# Patient Record
Sex: FEMALE | Race: WHITE | Employment: OTHER | URBAN - METROPOLITAN AREA
[De-identification: names, ages, dates, MRNs, and addresses within clinical notes are randomized per-mention and may not be internally consistent; named-entity substitution may affect disease eponyms.]

---

## 2017-03-15 ENCOUNTER — HOSPITAL ENCOUNTER (OUTPATIENT)
Dept: WOMENS IMAGING | Age: 72
Discharge: HOME | End: 2017-03-15
Attending: FAMILY MEDICINE | Admitting: FAMILY MEDICINE

## 2017-03-15 DIAGNOSIS — Z12.31 ENCOUNTER FOR SCREENING MAMMOGRAM FOR BREAST CANCER: ICD-10-CM

## 2017-03-15 DIAGNOSIS — Z00.00 ENCOUNTER FOR GENERAL ADULT MEDICAL EXAMINATION WITHOUT ABNORMAL FINDINGS: ICD-10-CM

## 2017-03-15 DIAGNOSIS — Z13.820 SCREENING FOR OSTEOPOROSIS: ICD-10-CM

## 2017-03-15 DIAGNOSIS — Z00.00 ROUTINE GENERAL MEDICAL EXAMINATION AT A HEALTH CARE FACILITY: ICD-10-CM

## 2017-04-03 ENCOUNTER — OFFICE VISIT (OUTPATIENT)
Dept: INTERNAL MEDICINE CLINIC | Age: 72
End: 2017-04-03

## 2017-04-03 VITALS
BODY MASS INDEX: 39.82 KG/M2 | DIASTOLIC BLOOD PRESSURE: 82 MMHG | HEART RATE: 80 BPM | SYSTOLIC BLOOD PRESSURE: 124 MMHG | WEIGHT: 239 LBS | HEIGHT: 65 IN

## 2017-04-03 DIAGNOSIS — I10 ESSENTIAL HYPERTENSION: ICD-10-CM

## 2017-04-03 DIAGNOSIS — D17.9 LIPOMA, UNSPECIFIED SITE: ICD-10-CM

## 2017-04-03 DIAGNOSIS — N18.30 CHRONIC KIDNEY DISEASE (CKD), STAGE III (MODERATE) (HCC): ICD-10-CM

## 2017-04-03 DIAGNOSIS — E78.2 MIXED HYPERLIPIDEMIA: Primary | ICD-10-CM

## 2017-04-03 DIAGNOSIS — M67.431 GANGLION OF WRIST, RIGHT: ICD-10-CM

## 2017-04-03 DIAGNOSIS — R73.9 HYPERGLYCEMIA: ICD-10-CM

## 2017-04-03 DIAGNOSIS — Z79.899 LONG-TERM USE OF HIGH-RISK MEDICATION: ICD-10-CM

## 2017-04-03 DIAGNOSIS — E66.01 SEVERE OBESITY (BMI 35.0-39.9): ICD-10-CM

## 2017-04-03 PROBLEM — M67.439 GANGLION OF WRIST: Status: ACTIVE | Noted: 2017-04-03

## 2017-04-03 PROBLEM — D17.1 LIPOMA OF TORSO: Status: ACTIVE | Noted: 2017-04-03

## 2017-04-03 LAB
A/G RATIO: 1.7 (ref 1.1–2.2)
ALBUMIN SERPL-MCNC: 4.4 G/DL (ref 3.4–5)
ALP BLD-CCNC: 73 U/L (ref 40–129)
ALT SERPL-CCNC: 10 U/L (ref 10–40)
ANION GAP SERPL CALCULATED.3IONS-SCNC: 16 MMOL/L (ref 3–16)
AST SERPL-CCNC: 9 U/L (ref 15–37)
BASOPHILS ABSOLUTE: 0.1 K/UL (ref 0–0.2)
BASOPHILS RELATIVE PERCENT: 0.7 %
BILIRUB SERPL-MCNC: 0.6 MG/DL (ref 0–1)
BUN BLDV-MCNC: 17 MG/DL (ref 7–20)
CALCIUM SERPL-MCNC: 9.3 MG/DL (ref 8.3–10.6)
CHLORIDE BLD-SCNC: 99 MMOL/L (ref 99–110)
CHOLESTEROL, TOTAL: 193 MG/DL (ref 0–199)
CO2: 24 MMOL/L (ref 21–32)
CREAT SERPL-MCNC: 1.1 MG/DL (ref 0.6–1.2)
EOSINOPHILS ABSOLUTE: 0.1 K/UL (ref 0–0.6)
EOSINOPHILS RELATIVE PERCENT: 1.2 %
GFR AFRICAN AMERICAN: 59
GFR NON-AFRICAN AMERICAN: 49
GLOBULIN: 2.6 G/DL
GLUCOSE BLD-MCNC: 123 MG/DL (ref 70–99)
HCT VFR BLD CALC: 47.2 % (ref 36–48)
HDLC SERPL-MCNC: 50 MG/DL (ref 40–60)
HEMOGLOBIN: 15.5 G/DL (ref 12–16)
LDL CHOLESTEROL CALCULATED: 102 MG/DL
LYMPHOCYTES ABSOLUTE: 1.7 K/UL (ref 1–5.1)
LYMPHOCYTES RELATIVE PERCENT: 18.4 %
MCH RBC QN AUTO: 29.7 PG (ref 26–34)
MCHC RBC AUTO-ENTMCNC: 32.7 G/DL (ref 31–36)
MCV RBC AUTO: 90.6 FL (ref 80–100)
MONOCYTES ABSOLUTE: 0.8 K/UL (ref 0–1.3)
MONOCYTES RELATIVE PERCENT: 8.3 %
NEUTROPHILS ABSOLUTE: 6.5 K/UL (ref 1.7–7.7)
NEUTROPHILS RELATIVE PERCENT: 71.4 %
PDW BLD-RTO: 13.9 % (ref 12.4–15.4)
PLATELET # BLD: 264 K/UL (ref 135–450)
PMV BLD AUTO: 7.7 FL (ref 5–10.5)
POTASSIUM SERPL-SCNC: 4.5 MMOL/L (ref 3.5–5.1)
RBC # BLD: 5.21 M/UL (ref 4–5.2)
SODIUM BLD-SCNC: 139 MMOL/L (ref 136–145)
TOTAL PROTEIN: 7 G/DL (ref 6.4–8.2)
TRIGL SERPL-MCNC: 203 MG/DL (ref 0–150)
URIC ACID, SERUM: 6.7 MG/DL (ref 2.6–6)
VLDLC SERPL CALC-MCNC: 41 MG/DL
WBC # BLD: 9.1 K/UL (ref 4–11)

## 2017-04-03 PROCEDURE — 99214 OFFICE O/P EST MOD 30 MIN: CPT | Performed by: FAMILY MEDICINE

## 2017-04-03 ASSESSMENT — ENCOUNTER SYMPTOMS
WHEEZING: 0
SHORTNESS OF BREATH: 0
COUGH: 0
CHEST TIGHTNESS: 0

## 2017-04-04 LAB
ESTIMATED AVERAGE GLUCOSE: 131.2 MG/DL
HBA1C MFR BLD: 6.2 %

## 2017-05-19 ENCOUNTER — TELEPHONE (OUTPATIENT)
Dept: INTERNAL MEDICINE CLINIC | Age: 72
End: 2017-05-19

## 2017-09-29 ENCOUNTER — TELEPHONE (OUTPATIENT)
Dept: RHEUMATOLOGY | Age: 72
End: 2017-09-29

## 2017-09-29 DIAGNOSIS — H53.8 CLOUDY VISION: Primary | ICD-10-CM

## 2017-09-29 NOTE — TELEPHONE ENCOUNTER
Let me know if I need to do order for the referral.  If yes, I need to know the diagnosis or reason for the visit.

## 2017-09-29 NOTE — TELEPHONE ENCOUNTER
Patient calling stating that she needs a referral for   Kristy Ward for an Eye Exam  Fax# [phone]  Ph# [phone]  Appt Oct 10th 1:45  [employer] [facility]

## 2017-10-03 ENCOUNTER — OFFICE VISIT (OUTPATIENT)
Dept: INTERNAL MEDICINE CLINIC | Age: 72
End: 2017-10-03

## 2017-10-03 VITALS
WEIGHT: 241.2 LBS | BODY MASS INDEX: 40.19 KG/M2 | SYSTOLIC BLOOD PRESSURE: 126 MMHG | HEIGHT: 65 IN | DIASTOLIC BLOOD PRESSURE: 84 MMHG | HEART RATE: 64 BPM

## 2017-10-03 DIAGNOSIS — Z23 NEEDS FLU SHOT: ICD-10-CM

## 2017-10-03 DIAGNOSIS — E66.01 SEVERE OBESITY (BMI >= 40) (HCC): ICD-10-CM

## 2017-10-03 DIAGNOSIS — G47.33 OBSTRUCTIVE SLEEP APNEA: ICD-10-CM

## 2017-10-03 DIAGNOSIS — I10 ESSENTIAL HYPERTENSION: ICD-10-CM

## 2017-10-03 DIAGNOSIS — F41.1 GENERALIZED ANXIETY DISORDER: ICD-10-CM

## 2017-10-03 DIAGNOSIS — R73.02 IMPAIRED GLUCOSE TOLERANCE: ICD-10-CM

## 2017-10-03 DIAGNOSIS — E78.2 MIXED HYPERLIPIDEMIA: Primary | ICD-10-CM

## 2017-10-03 LAB
A/G RATIO: 1.4 (ref 1.1–2.2)
ALBUMIN SERPL-MCNC: 4.1 G/DL (ref 3.4–5)
ALP BLD-CCNC: 72 U/L (ref 40–129)
ALT SERPL-CCNC: 13 U/L (ref 10–40)
ANION GAP SERPL CALCULATED.3IONS-SCNC: 14 MMOL/L (ref 3–16)
AST SERPL-CCNC: 10 U/L (ref 15–37)
BASOPHILS ABSOLUTE: 0.1 K/UL (ref 0–0.2)
BASOPHILS RELATIVE PERCENT: 1 %
BILIRUB SERPL-MCNC: 0.5 MG/DL (ref 0–1)
BUN BLDV-MCNC: 12 MG/DL (ref 7–20)
CALCIUM SERPL-MCNC: 9.3 MG/DL (ref 8.3–10.6)
CHLORIDE BLD-SCNC: 98 MMOL/L (ref 99–110)
CO2: 29 MMOL/L (ref 21–32)
CREAT SERPL-MCNC: 1 MG/DL (ref 0.6–1.2)
EOSINOPHILS ABSOLUTE: 0.3 K/UL (ref 0–0.6)
EOSINOPHILS RELATIVE PERCENT: 3.1 %
GFR AFRICAN AMERICAN: >60
GFR NON-AFRICAN AMERICAN: 54
GLOBULIN: 2.9 G/DL
GLUCOSE BLD-MCNC: 127 MG/DL (ref 70–99)
HCT VFR BLD CALC: 47 % (ref 36–48)
HEMOGLOBIN: 15.7 G/DL (ref 12–16)
LYMPHOCYTES ABSOLUTE: 2.1 K/UL (ref 1–5.1)
LYMPHOCYTES RELATIVE PERCENT: 24.8 %
MCH RBC QN AUTO: 30.9 PG (ref 26–34)
MCHC RBC AUTO-ENTMCNC: 33.5 G/DL (ref 31–36)
MCV RBC AUTO: 92.3 FL (ref 80–100)
MONOCYTES ABSOLUTE: 0.9 K/UL (ref 0–1.3)
MONOCYTES RELATIVE PERCENT: 10.1 %
NEUTROPHILS ABSOLUTE: 5.3 K/UL (ref 1.7–7.7)
NEUTROPHILS RELATIVE PERCENT: 61 %
PDW BLD-RTO: 13.8 % (ref 12.4–15.4)
PLATELET # BLD: 282 K/UL (ref 135–450)
PMV BLD AUTO: 7.4 FL (ref 5–10.5)
POTASSIUM SERPL-SCNC: 4.6 MMOL/L (ref 3.5–5.1)
RBC # BLD: 5.09 M/UL (ref 4–5.2)
SODIUM BLD-SCNC: 141 MMOL/L (ref 136–145)
TOTAL PROTEIN: 7 G/DL (ref 6.4–8.2)
URIC ACID, SERUM: 7.1 MG/DL (ref 2.6–6)
WBC # BLD: 8.7 K/UL (ref 4–11)

## 2017-10-03 PROCEDURE — 99214 OFFICE O/P EST MOD 30 MIN: CPT | Performed by: FAMILY MEDICINE

## 2017-10-03 PROCEDURE — G0008 ADMIN INFLUENZA VIRUS VAC: HCPCS | Performed by: FAMILY MEDICINE

## 2017-10-03 PROCEDURE — 90662 IIV NO PRSV INCREASED AG IM: CPT | Performed by: FAMILY MEDICINE

## 2017-10-03 RX ORDER — SIMVASTATIN 40 MG
TABLET ORAL
Qty: 30 TABLET | Refills: 5 | Status: SHIPPED | OUTPATIENT
Start: 2017-10-03 | End: 2017-10-04 | Stop reason: SDUPTHER

## 2017-10-03 RX ORDER — BUPROPION HYDROCHLORIDE 150 MG/1
TABLET ORAL
Qty: 90 TABLET | Refills: 3 | Status: SHIPPED | OUTPATIENT
Start: 2017-10-03 | End: 2018-10-03 | Stop reason: SDUPTHER

## 2017-10-03 RX ORDER — METFORMIN HYDROCHLORIDE 500 MG/1
TABLET, EXTENDED RELEASE ORAL
Qty: 30 TABLET | Refills: 5 | Status: SHIPPED | OUTPATIENT
Start: 2017-10-03 | End: 2018-02-04 | Stop reason: SDUPTHER

## 2017-10-03 RX ORDER — LOSARTAN POTASSIUM AND HYDROCHLOROTHIAZIDE 12.5; 5 MG/1; MG/1
TABLET ORAL
Qty: 90 TABLET | Refills: 3 | Status: SHIPPED | OUTPATIENT
Start: 2017-10-03 | End: 2018-10-07 | Stop reason: ALTCHOICE

## 2017-10-03 RX ORDER — ESCITALOPRAM OXALATE 10 MG/1
TABLET ORAL
Qty: 30 TABLET | Refills: 11 | Status: SHIPPED | OUTPATIENT
Start: 2017-10-03 | End: 2018-03-30 | Stop reason: SDUPTHER

## 2017-10-03 ASSESSMENT — PATIENT HEALTH QUESTIONNAIRE - PHQ9
SUM OF ALL RESPONSES TO PHQ9 QUESTIONS 1 & 2: 1
2. FEELING DOWN, DEPRESSED OR HOPELESS: 0
1. LITTLE INTEREST OR PLEASURE IN DOING THINGS: 1
SUM OF ALL RESPONSES TO PHQ QUESTIONS 1-9: 1

## 2017-10-03 ASSESSMENT — ENCOUNTER SYMPTOMS
CHEST TIGHTNESS: 0
WHEEZING: 0
COUGH: 0
SHORTNESS OF BREATH: 0

## 2017-10-03 NOTE — PATIENT INSTRUCTIONS
Floaters and Flashes: Care Instructions  Your Care Instructions    Floaters are spots and lines that \"float\" across your field of vision. They are caused by stray cells or strands of tissue inside the eyeball. Flashes are sparkles or lightning streaks. These occur in your side vision. This is called the peripheral vision. Floaters and flashes usually aren't serious. In many cases, they're a normal part of getting older. Some people get used to them. Others find them annoying. If floaters bother you, you can try to look up and then down. This may make them go away. For now, your doctor doesn't think your symptoms are a sign of a more serious problem. But an eye exam is the only way to know for sure. Follow-up care is a key part of your treatment and safety. Be sure to make and go to all appointments, and call your doctor if you are having problems. It's also a good idea to know your test results and keep a list of the medicines you take. When should you call for help? Call your doctor now or seek immediate medical care if:  · You have vision changes. Watch closely for changes in your health, and be sure to contact your doctor if:  · You see new floaters. · You see new flashes of light. · You do not get better as expected. Where can you learn more? Go to [email or web address] and sign in to your [employer] account. Enter L960 in the [facility] box to learn more about \"Floaters and Flashes: Care Instructions. \"     If you do not have an account, please click on the \"Sign Up Now\" link. Current as of: March 14, 2017  Content Version: 11.3  © 5900-6608 [employer]. Care instructions adapted under license by [facility] ([facility]). If you have questions about a medical condition or this instruction, always ask your healthcare professional. [place] [address] any warranty or liability for your use of this information.     [id number]      WEIGHT 1 pound per week each month consistently.

## 2017-10-03 NOTE — MR AVS SNAPSHOT
After Visit Summary             Pk Harman   10/3/2017 10:20 AM   Office Visit    Description:  Female : 1945   Provider:  Sandeep Puente MD   Department:  [facility]              Your Follow-Up and Future Appointments         Below is a list of your follow-up and future appointments. This may not be a complete list as you may have made appointments directly with providers that we are not aware of or your providers may have made some for you. Please call your providers to confirm appointments. It is important to keep your appointments. Please bring your current insurance card, photo ID, co-pay, and all medication bottles to your appointment. If self-pay, payment is expected at the time of service. Your To-Do List     Follow-Up    Return in about 6 months (around 4/3/2018) for fasting labs and medication check up. Information from Your Visit        Department     Name Address Phone Fax    [facility] [address] [place] [address] [phone]      You Were Seen for:         Comments    Mixed hyperlipidemia   [272. 2. ICD-9-CM]         Vital Signs     Blood Pressure Pulse Height Weight Body Mass Index Smoking Status    146/84 64 5' 5\" (1.651 m) 241 lb 3.2 oz (109.4 kg) 40.14 kg/m2 Never Smoker      Additional Information about your Body Mass Index (BMI)           Your BMI as listed above is considered obese (30 or more). BMI is an estimate of body fat, calculated from your height and weight. The higher your BMI, the greater your risk of heart disease, high blood pressure, type 2 diabetes, stroke, gallstones, arthritis, sleep apnea, and certain cancers. BMI is not perfect. It may overestimate body fat in athletes and people who are more muscular.   Even a small weight loss (between 5 and 10 percent of your current weight) by decreasing your calorie intake and license by [facility] ([facility]). If you have questions about a medical condition or this instruction, always ask your healthcare professional. [place] [address] any warranty or liability for your use of this information. [email or web address]      WEIGHT LOSS SUGGESTIONS --- pick and choose items from the following to work on.  1.  Eat breakfast --- the most important meal of the day (and try to spread your calories out over the day.)    2. Eat your biggest meal of the day at lunch or avoid having all your calories for supper. Food if to provide energy during your time of activity. We do not need food before we sleep or rest.  3.  Begin meals with a low fat salad, soup, broth or a glass of water. 4.  Eat more vegetables and whole grains at each meal.  5.  Limit sugar sweetened beverages to no more than 8 ounces of REAL fruit juice per day. Even sweet flavored diet drinks can make you crave more sweets and starches. 6.  Limit your alcohol intake. 7.  Know what a portion size looks like --- and stick to it. Measure food or learn the portion sizes using your hand, palm and fist.  Using smaller plates and glasses can help. Portion sizes:  Solid foods like a potato = the size of your fist.  Flat foods, like a piece of meat the size of your hand minus your fingers. 8.  Eat slowly so your body has time to know when you are full. Also slowing down will help you focus on enjoying the tastes and smells of your food. Stop eating when you feel satisfied, not stuffed. 9.  Be more active in your daily life. 8. Get support from family and friends. Support is important for long-term success. 11.  Fill 1/2 your plate with vegetables and fruits;  1/4 with protein like meat or beans and 1/4 with starch --- whole grain is best.    12. AVOID ARTIFICIAL SWEETENERS and HIGH FRUCTOSE CORN SYRUP as much as possible and certainly do not use these every day.   Therefore, all soda [address] [facility]  [address] [facility] [address]     Phone:  [phone]     buPROPion 150 MG extended release tablet    escitalopram 10 MG tablet    losartan-hydrochlorothiazide 50-12.5 MG per tablet    metFORMIN 500 MG extended release tablet    simvastatin 40 MG tablet               Your Current Medications Are              metFORMIN (GLUCOPHAGE-XR) 500 MG extended release tablet Take 1 tablet daily:  End of breakfast OR at bedtime. buPROPion (WELLBUTRIN XL) 150 MG extended release tablet TAKE ONE TABLET BY MOUTH ONE TIME DAILY    escitalopram (LEXAPRO) 10 MG tablet TAKE 1 TABLET BY MOUTH DAILY    losartan-hydrochlorothiazide (HYZAAR) 50-12.5 MG per tablet TAKE ONE TABLET BY MOUTH DAILY    simvastatin (ZOCOR) 40 MG tablet TAKE 1 TABLET BY MOUTH NIGHTLY    gabapentin (NEURONTIN) 300 MG capsule TAKE ONE CAPSULE BY MOUTH THREE TIMES DAILY    diphenhydrAMINE (BENADRYL) 25 MG capsule Take 25 mg by mouth nightly as needed for Itching    ibuprofen (ADVIL;MOTRIN) 800 MG tablet TAKE ONE TABLET EVERY 8 HOURS AS NEEDED FOR PAIN    omeprazole (PRILOSEC) 20 MG capsule Take 20 mg by mouth daily. cetirizine (ZYRTEC) 10 MG tablet Take 10 mg by mouth daily. Allergies           No Known Allergies      We Ordered/Performed the following           CBC Auto Differential     Comprehensive Metabolic Panel     Hemoglobin A1C     Influenza, High Dose, 65 yrs  and older, IM, PF, Prefill Syr, 0.5mL (FLUZONE HD)     Uric Acid     [facility]     Scheduling Instructions:    [facility]   [address] [place] [address], [facility]  Phone: [phone]    Comments: The patient can be scheduled with any member of the group, including the provider with the first available appointments. She has CPAP and had sleep study at [facility]. Airy? She needs new parts and equipment. Please evaluate.          Additional Information Basic Information     Date Of Birth Sex Race Ethnicity Preferred Language    1945 Female White Non-/Non  English      Problem List as of 10/3/2017  Date Reviewed: 4/3/2017                Lipoma    Ganglion of wrist    BREWSTER (dyspnea on exertion)    Obesity, severe (BMI >= 40) (HCC)    Periodic limb movement disorder    Osteoarthritis    GERD (gastroesophageal reflux disease)    Mixed hyperlipidemia    Obstructive sleep apnea    Pre-diabetes    Hypertension    Generalized anxiety disorder    Lipoma of skin    Myalgia and myositis      Immunizations as of 10/3/2017     Name Date    Influenza Virus Vaccine 9/30/2013, 9/15/2011    Influenza, High Dose 9/7/2016, 11/10/2014    Pneumococcal 13-valent Conjugate ([id number]) 3/1/2016    Pneumococcal Polysaccharide ([id number]) 1/28/2013    Td 6/5/2008    Tdap (Boostrix, Adacel) 3/1/2016      Preventive Care        Date Due    Yearly Flu Vaccine (1) 9/1/2017    Colonoscopy 9/5/2018    Mammograms are recommended every 2 years for low/average risk patients aged 48 - 69, and every year for high risk patients per updated national guidelines. However these guidelines can be individualized by your provider. 3/15/2019    Osteoporosis screening or a bone density scan (Dexa) is recommended once at age 72. Based upon the results and risk factors for bone loss, your provider will recommend whether this needs to be repeated. 3/15/2022    Cholesterol Screening 4/3/2022    Tetanus Combination Vaccine (2 - Td) 3/1/2026            MyChart Signup           [employer] allows you to send messages to your doctor, view your test results, renew your prescriptions, schedule appointments, view visit notes, and more. How Do I Sign Up? 1. In your Internet browser, go to [email or web address]  2. Click on the Sign Up Now link in the Sign In box. You will see the New Member Sign Up page. 3. Enter your [facility] Access Code exactly as it appears below. You will not need to use this code after youve completed the sign-up process. If you do not sign up before the expiration date, you must request a new code. [facility] Access Code: [email or web address]  Expires: 12/2/2017 11:21 AM    4. Enter your Social Security Number ([id number]) and Date of Birth (mm/dd/yyyy) as indicated and click Submit. You will be taken to the next sign-up page. 5. Create a Vimblyt ID. This will be your [facility] login ID and cannot be changed, so think of one that is secure and easy to remember. 6. Create a [facility] password. You can change your password at any time. 7. Enter your Password Reset Question and Answer. This can be used at a later time if you forget your password. 8. Enter your e-mail address. You will receive e-mail notification when new information is available in [address]. 9. Click Sign Up. You can now view your medical record. Additional Information  If you have questions, please contact the physician practice where you receive care. Remember, [facility] is NOT to be used for urgent needs. For medical emergencies, dial 911. For questions regarding your [facility] account call [phone]. If you have a clinical question, please call your doctor's office.

## 2017-10-03 NOTE — PROGRESS NOTES
tablet Take 10 mg by mouth daily. Social History   Substance Use Topics    Smoking status: Never Smoker    Smokeless tobacco: Never Used    Alcohol use Yes      Comment: rarely             Review of Systems   Constitutional: Negative for unexpected weight change. Respiratory: Negative for cough, chest tightness, shortness of breath and wheezing. Cardiovascular: Negative for chest pain, palpitations and leg swelling. Endocrine: Negative for polydipsia, polyphagia and polyuria. Skin: Negative for rash and wound. Neurological: Negative for dizziness, syncope, facial asymmetry, speech difficulty, weakness, numbness and headaches. Psychiatric/Behavioral: Negative for dysphoric mood. Objective:   Physical Exam   Constitutional: She is oriented to person, place, and time. She appears well-developed and well-nourished. No distress. Eyes: Conjunctivae are normal. No scleral icterus. Neck: Normal range of motion. Neck supple. Carotid bruit is not present. No thyroid mass and no thyromegaly present. Cardiovascular: Normal rate, regular rhythm, S1 normal, S2 normal, normal heart sounds and intact distal pulses. No murmur heard. Pulmonary/Chest: Effort normal and breath sounds normal. No respiratory distress. She has no decreased breath sounds. She has no wheezes. She has no rhonchi. She has no rales. Abdominal: Soft. Normal appearance and bowel sounds are normal. She exhibits no abdominal bruit and no mass. There is no hepatomegaly. There is no tenderness. Lymphadenopathy:     She has no cervical adenopathy. Neurological: She is alert and oriented to person, place, and time. She displays no tremor. She exhibits normal muscle tone. Coordination and gait normal.   Skin: Skin is warm and dry. She is not diaphoretic. No cyanosis. No pallor. Nails show no clubbing. Psychiatric: She has a normal mood and affect.  Her speech is normal and behavior is normal. Cognition and memory are Center  - CBC Auto Differential    7. Needs influenza vaccine. - high dose given. Vaccine information statement given. Risk and benefits of vaccine(s) given discussed with patient and questions answered. Plan:      See assessment and/or orders. See after visit summary, patient instructions, and reference hand-outs. Discussed use, benefit, and side effects of prescribed medications. Barriers to medication compliance addressed. All patient questions answered.

## 2017-10-04 LAB
ESTIMATED AVERAGE GLUCOSE: 134.1 MG/DL
HBA1C MFR BLD: 6.3 %

## 2017-11-18 DIAGNOSIS — F41.1 GENERALIZED ANXIETY DISORDER: ICD-10-CM

## 2017-11-20 RX ORDER — ESCITALOPRAM OXALATE 10 MG/1
TABLET ORAL
Qty: 30 TABLET | Refills: 4 | Status: SHIPPED | OUTPATIENT
Start: 2017-11-20 | End: 2018-03-30 | Stop reason: SDUPTHER

## 2017-12-07 ENCOUNTER — OFFICE VISIT (OUTPATIENT)
Dept: SLEEP MEDICINE | Age: 72
End: 2017-12-07

## 2017-12-07 VITALS
HEART RATE: 75 BPM | RESPIRATION RATE: 16 BRPM | DIASTOLIC BLOOD PRESSURE: 78 MMHG | HEIGHT: 65 IN | WEIGHT: 236.8 LBS | BODY MASS INDEX: 39.45 KG/M2 | OXYGEN SATURATION: 97 % | SYSTOLIC BLOOD PRESSURE: 138 MMHG

## 2017-12-07 DIAGNOSIS — Z71.3 DIETARY COUNSELING: ICD-10-CM

## 2017-12-07 DIAGNOSIS — Z99.89 OSA ON CPAP: Primary | ICD-10-CM

## 2017-12-07 DIAGNOSIS — G47.33 OSA ON CPAP: Primary | ICD-10-CM

## 2017-12-07 DIAGNOSIS — E66.01 MORBID OBESITY DUE TO EXCESS CALORIES (HCC): ICD-10-CM

## 2017-12-07 PROCEDURE — 3014F SCREEN MAMMO DOC REV: CPT | Performed by: PSYCHIATRY & NEUROLOGY

## 2017-12-07 PROCEDURE — 1090F PRES/ABSN URINE INCON ASSESS: CPT | Performed by: PSYCHIATRY & NEUROLOGY

## 2017-12-07 PROCEDURE — 1123F ACP DISCUSS/DSCN MKR DOCD: CPT | Performed by: PSYCHIATRY & NEUROLOGY

## 2017-12-07 PROCEDURE — 1036F TOBACCO NON-USER: CPT | Performed by: PSYCHIATRY & NEUROLOGY

## 2017-12-07 PROCEDURE — G8399 PT W/DXA RESULTS DOCUMENT: HCPCS | Performed by: PSYCHIATRY & NEUROLOGY

## 2017-12-07 PROCEDURE — 3017F COLORECTAL CA SCREEN DOC REV: CPT | Performed by: PSYCHIATRY & NEUROLOGY

## 2017-12-07 PROCEDURE — G8417 CALC BMI ABV UP PARAM F/U: HCPCS | Performed by: PSYCHIATRY & NEUROLOGY

## 2017-12-07 PROCEDURE — G8484 FLU IMMUNIZE NO ADMIN: HCPCS | Performed by: PSYCHIATRY & NEUROLOGY

## 2017-12-07 PROCEDURE — 99204 OFFICE O/P NEW MOD 45 MIN: CPT | Performed by: PSYCHIATRY & NEUROLOGY

## 2017-12-07 PROCEDURE — 4040F PNEUMOC VAC/ADMIN/RCVD: CPT | Performed by: PSYCHIATRY & NEUROLOGY

## 2017-12-07 PROCEDURE — G8427 DOCREV CUR MEDS BY ELIG CLIN: HCPCS | Performed by: PSYCHIATRY & NEUROLOGY

## 2017-12-07 ASSESSMENT — SLEEP AND FATIGUE QUESTIONNAIRES
HOW LIKELY ARE YOU TO NOD OFF OR FALL ASLEEP WHILE WATCHING TV: 3
HOW LIKELY ARE YOU TO NOD OFF OR FALL ASLEEP WHILE SITTING AND TALKING TO SOMEONE: 0
HOW LIKELY ARE YOU TO NOD OFF OR FALL ASLEEP IN A CAR, WHILE STOPPED FOR A FEW MINUTES IN TRAFFIC: 0
HOW LIKELY ARE YOU TO NOD OFF OR FALL ASLEEP WHILE LYING DOWN TO REST IN THE AFTERNOON WHEN CIRCUMSTANCES PERMIT: 3
HOW LIKELY ARE YOU TO NOD OFF OR FALL ASLEEP WHEN YOU ARE A PASSENGER IN A CAR FOR AN HOUR WITHOUT A BREAK: 2
HOW LIKELY ARE YOU TO NOD OFF OR FALL ASLEEP WHILE SITTING AND READING: 2
HOW LIKELY ARE YOU TO NOD OFF OR FALL ASLEEP WHILE SITTING INACTIVE IN A PUBLIC PLACE: 0
NECK CIRCUMFERENCE (INCHES): 15.5
HOW LIKELY ARE YOU TO NOD OFF OR FALL ASLEEP WHILE SITTING QUIETLY AFTER LUNCH WITHOUT ALCOHOL: 1
ESS TOTAL SCORE: 11

## 2017-12-07 ASSESSMENT — ENCOUNTER SYMPTOMS
ALLERGIC/IMMUNOLOGIC NEGATIVE: 1
WHEEZING: 1
EYES NEGATIVE: 1

## 2017-12-07 NOTE — PROGRESS NOTES
patient has trouble falling asleep due to this feeling, mostly at the bed time associated with sleep disturbance and with involuntary, jerking movements of the legs during sleep. Had study done on 8/16/2006 which showed an AHI - 67.3/hr with Low SaO2 - 83% and time below 90% of 18% of the time. This is consistent with severe TAZ (327.23)        DOT/CDL - No  FAA/'s license - No      Previous Report(s) Reviewed: historical medical records         Social History     Social History    Marital status:      Spouse name: Bard Moss Number of children: 2    Years of education: N/A     Occupational History     at [email or web address] History Main Topics    Smoking status: Never Smoker    Smokeless tobacco: Never Used    Alcohol use Yes      Comment: rarely    Drug use: Unknown    Sexual activity: Not on file     Other Topics Concern    Not on file     Social History Narrative     with prostate cancer, but doing well. History of stomach cancer 2001. Does water aerobics at [facility]        Caffeine: SODA - 1 can a day TEA - 0  COFFEE - 1 cup in the morning           Prior to Admission medications    Medication Sig Start Date End Date Taking? Authorizing Provider   escitalopram (LEXAPRO) 10 MG tablet TAKE 1 TABLET BY MOUTH DAILY 11/20/17  Yes Ruma Bazan MD   simvastatin (ZOCOR) 40 MG tablet TAKE 1 TABLET BY MOUTH NIGHTLY 10/4/17  Yes Ruma Bazan MD   metFORMIN (GLUCOPHAGE-XR) 500 MG extended release tablet Take 1 tablet daily:  End of breakfast OR at bedtime.  10/3/17  Yes Ruma Bazan MD   buPROPion (WELLBUTRIN XL) 150 MG extended release tablet TAKE ONE TABLET BY MOUTH ONE TIME DAILY 10/3/17  Yes Ruma Bazan MD   escitalopram (LEXAPRO) 10 MG tablet TAKE 1 TABLET BY MOUTH DAILY 10/3/17  Yes Ruma Bazan MD   losartan-hydrochlorothiazide (HYZAAR) 50-12.5 MG per tablet TAKE ONE TABLET BY MOUTH DAILY 10/3/17  Yes Ruma Bazan MD gabapentin (NEURONTIN) 300 MG capsule TAKE ONE CAPSULE BY MOUTH THREE TIMES DAILY 5/19/17  Yes Talisha Turner MD   diphenhydrAMINE (BENADRYL) 25 MG capsule Take 25 mg by mouth nightly as needed for Itching   Yes Historical Provider, MD   ibuprofen (ADVIL;MOTRIN) 800 MG tablet TAKE ONE TABLET EVERY 8 HOURS AS NEEDED FOR PAIN 3/1/16  Yes Talisha Turner MD   omeprazole (PRILOSEC) 20 MG capsule Take 20 mg by mouth daily. Yes Historical Provider, MD   cetirizine (ZYRTEC) 10 MG tablet Take 10 mg by mouth daily.      Yes Historical Provider, MD       Allergies as of 12/07/2017    (No Known Allergies)       Patient Active Problem List   Diagnosis    Osteoarthritis    GERD (gastroesophageal reflux disease)    Mixed hyperlipidemia    Obstructive sleep apnea    Impaired glucose tolerance    Hypertension    Generalized anxiety disorder    Lipoma of skin    Myalgia and myositis    Chronic kidney disease (CKD), stage III (moderate)    Periodic limb movement disorder    Obesity, severe (BMI >= 40) (HCC)    BREWSTER (dyspnea on exertion)    Lipoma    Ganglion of wrist    Morbid obesity due to excess calories ([address]       Past Medical History:   Diagnosis Date    Abnormal EKG     RBBB < 2008    Abnormal glucose tolerance test     Cataract     Chronic kidney disease (CKD), stage III (moderate)     Elbow fracture 6/5/2008    Generalized anxiety disorder     GERD (gastroesophageal reflux disease)     Herpes zoster 8/2015    Hypertension     Lipoma of skin     Mixed hyperlipidemia     Myalgia and myositis     Obstructive sleep apnea     Osteoarthritis     Periodic limb movement disorder     Psoriasis     Seasonal allergic rhinitis     pollens    Tubular adenoma of colon 9/13       Past Surgical History:   Procedure Laterality Date    CATARACT REMOVAL  2011    Jeffrey Lopez MD    CHOLECYSTECTOMY, LAPAROSCOPIC  6/11/15    Dr. Prudencio Kc  6/08    ORIF right;  both fractured    SOLIS distal pulses. Pulmonary/Chest: Effort normal and breath sounds normal. No respiratory distress. She has no wheezes. She has no rales. Musculoskeletal: Normal range of motion. She exhibits no edema or tenderness. Neurological: She is alert. She has normal reflexes. Skin: Skin is warm. Psychiatric: She has a normal mood and affect. Nursing note and vitals reviewed. Assessment:   Severe Obstructive Sleep Apnea/Hypopnea Syndrome, has gained weight since last titration addition to she has significant EDS despite of CPAP usage. 1. TAZ on CPAP  Sleep Study with PAP Titration   2. Morbid obesity due to excess calories ([address]       Plan:     Patient was counseled about the pathophysiology of obstructive sleep apnea syndrome and the methods for evaluating its presence and severity. Patient was counseled to avoid driving and other potentially hazardous circumstances if the patient is experiencing excessive sleepiness. Treatment considerations include the use of nasal CPAP, oral dental appliance or a surgical intervention, which should be based on otolarygologic findings, In the meantime, the patient should be cautioned to avoid the use of alcohol or other depressant medications because of potential for increasing the duration and severity of apnea and cautioned regarding driving or operating and dangerous equipment if the patient is experiencing daytime sleepiness. .        Orders Placed This Encounter   Procedures    Sleep Study with PAP Titration       Return in about 3 months (around 3/7/2018) for Reveiwing CPAP usage and compliance report and tro.     Ricco Macias MD  Medical Director [address] [place] [address]

## 2018-01-18 ENCOUNTER — HOSPITAL ENCOUNTER (OUTPATIENT)
Dept: OTHER | Age: 73
Discharge: HOME | End: 2018-01-20
Attending: PSYCHIATRY & NEUROLOGY | Admitting: PSYCHIATRY & NEUROLOGY

## 2018-01-18 DIAGNOSIS — G47.33 OSA ON CPAP: ICD-10-CM

## 2018-01-18 DIAGNOSIS — Z99.89 OSA ON CPAP: ICD-10-CM

## 2018-01-18 PROCEDURE — 95811 POLYSOM 6/>YRS CPAP 4/> PARM: CPT | Performed by: PSYCHIATRY & NEUROLOGY

## 2018-01-24 ENCOUNTER — TELEPHONE (OUTPATIENT)
Dept: PULMONOLOGY | Age: 73
End: 2018-01-24

## 2018-01-24 NOTE — TELEPHONE ENCOUNTER
Sleep study showed severe TAZ. AHI was 67.3  per hr. And O2 Desaturations to 83%.   Adequate control of events on cpap pressure 9/15    Pt advised and pt choice is Pro 2

## 2018-02-04 DIAGNOSIS — E78.2 MIXED HYPERLIPIDEMIA: ICD-10-CM

## 2018-02-04 DIAGNOSIS — R73.02 IMPAIRED GLUCOSE TOLERANCE: ICD-10-CM

## 2018-02-05 RX ORDER — SIMVASTATIN 40 MG
TABLET ORAL
Qty: 30 TABLET | Refills: 1 | Status: SHIPPED | OUTPATIENT
Start: 2018-02-05 | End: 2018-04-03 | Stop reason: SDUPTHER

## 2018-02-05 RX ORDER — METFORMIN HYDROCHLORIDE 500 MG/1
TABLET, EXTENDED RELEASE ORAL
Qty: 30 TABLET | Refills: 1 | Status: SHIPPED | OUTPATIENT
Start: 2018-02-05 | End: 2018-04-03 | Stop reason: SDUPTHER

## 2018-02-14 ENCOUNTER — TELEPHONE (OUTPATIENT)
Dept: PULMONOLOGY | Age: 73
End: 2018-02-14

## 2018-02-27 ENCOUNTER — TELEPHONE (OUTPATIENT)
Dept: PULMONOLOGY | Age: 73
End: 2018-02-27

## 2018-02-27 NOTE — TELEPHONE ENCOUNTER
Pt called stating that [facility] did not receive her order for cpap and she would like to switch to A1, she also stated she does not wish to have to contact companies, they should contact her and that we should be contacting them to make sure they got her order, she does not have time to contact them and then us  Let her know would send order and that if company does not reach her she would still have to contact them and gave her their number

## 2018-03-30 DIAGNOSIS — F41.1 GENERALIZED ANXIETY DISORDER: ICD-10-CM

## 2018-03-30 RX ORDER — ESCITALOPRAM OXALATE 10 MG/1
TABLET ORAL
Qty: 30 TABLET | Refills: 1 | Status: SHIPPED | OUTPATIENT
Start: 2018-03-30 | End: 2018-04-03 | Stop reason: SDUPTHER

## 2018-04-03 ENCOUNTER — OFFICE VISIT (OUTPATIENT)
Dept: INTERNAL MEDICINE CLINIC | Age: 73
End: 2018-04-03

## 2018-04-03 VITALS
SYSTOLIC BLOOD PRESSURE: 118 MMHG | HEART RATE: 64 BPM | WEIGHT: 237.8 LBS | BODY MASS INDEX: 39.62 KG/M2 | HEIGHT: 65 IN | DIASTOLIC BLOOD PRESSURE: 70 MMHG

## 2018-04-03 DIAGNOSIS — E66.01 MORBID OBESITY DUE TO EXCESS CALORIES (HCC): ICD-10-CM

## 2018-04-03 DIAGNOSIS — E78.2 MIXED HYPERLIPIDEMIA: Primary | ICD-10-CM

## 2018-04-03 DIAGNOSIS — R73.02 IMPAIRED GLUCOSE TOLERANCE: ICD-10-CM

## 2018-04-03 DIAGNOSIS — M15.9 PRIMARY OSTEOARTHRITIS INVOLVING MULTIPLE JOINTS: ICD-10-CM

## 2018-04-03 DIAGNOSIS — N18.30 CHRONIC KIDNEY DISEASE (CKD), STAGE III (MODERATE) (HCC): ICD-10-CM

## 2018-04-03 DIAGNOSIS — I10 ESSENTIAL HYPERTENSION: ICD-10-CM

## 2018-04-03 DIAGNOSIS — K21.9 GASTROESOPHAGEAL REFLUX DISEASE, ESOPHAGITIS PRESENCE NOT SPECIFIED: ICD-10-CM

## 2018-04-03 DIAGNOSIS — F41.1 GENERALIZED ANXIETY DISORDER: ICD-10-CM

## 2018-04-03 LAB
A/G RATIO: 1.7 (ref 1.1–2.2)
ALBUMIN SERPL-MCNC: 4.3 G/DL (ref 3.4–5)
ALP BLD-CCNC: 67 U/L (ref 40–129)
ALT SERPL-CCNC: 11 U/L (ref 10–40)
ANION GAP SERPL CALCULATED.3IONS-SCNC: 18 MMOL/L (ref 3–16)
AST SERPL-CCNC: 9 U/L (ref 15–37)
BASOPHILS ABSOLUTE: 0.1 K/UL (ref 0–0.2)
BASOPHILS RELATIVE PERCENT: 0.7 %
BILIRUB SERPL-MCNC: 0.5 MG/DL (ref 0–1)
BUN BLDV-MCNC: 14 MG/DL (ref 7–20)
CALCIUM SERPL-MCNC: 8.7 MG/DL (ref 8.3–10.6)
CHLORIDE BLD-SCNC: 98 MMOL/L (ref 99–110)
CHOLESTEROL, TOTAL: 166 MG/DL (ref 0–199)
CO2: 26 MMOL/L (ref 21–32)
CREAT SERPL-MCNC: 1 MG/DL (ref 0.6–1.2)
EOSINOPHILS ABSOLUTE: 0.2 K/UL (ref 0–0.6)
EOSINOPHILS RELATIVE PERCENT: 2.6 %
GFR AFRICAN AMERICAN: >60
GFR NON-AFRICAN AMERICAN: 54
GLOBULIN: 2.5 G/DL
GLUCOSE BLD-MCNC: 109 MG/DL (ref 70–99)
HCT VFR BLD CALC: 43.9 % (ref 36–48)
HDLC SERPL-MCNC: 42 MG/DL (ref 40–60)
HEMOGLOBIN: 14.9 G/DL (ref 12–16)
LDL CHOLESTEROL CALCULATED: 78 MG/DL
LYMPHOCYTES ABSOLUTE: 1.8 K/UL (ref 1–5.1)
LYMPHOCYTES RELATIVE PERCENT: 20.1 %
MAGNESIUM: 2.3 MG/DL (ref 1.8–2.4)
MCH RBC QN AUTO: 30.4 PG (ref 26–34)
MCHC RBC AUTO-ENTMCNC: 34 G/DL (ref 31–36)
MCV RBC AUTO: 89.4 FL (ref 80–100)
MONOCYTES ABSOLUTE: 0.8 K/UL (ref 0–1.3)
MONOCYTES RELATIVE PERCENT: 9.6 %
NEUTROPHILS ABSOLUTE: 5.9 K/UL (ref 1.7–7.7)
NEUTROPHILS RELATIVE PERCENT: 67 %
PDW BLD-RTO: 13.7 % (ref 12.4–15.4)
PLATELET # BLD: 274 K/UL (ref 135–450)
PMV BLD AUTO: 7.4 FL (ref 5–10.5)
POTASSIUM SERPL-SCNC: 4.2 MMOL/L (ref 3.5–5.1)
RBC # BLD: 4.91 M/UL (ref 4–5.2)
SODIUM BLD-SCNC: 142 MMOL/L (ref 136–145)
TOTAL PROTEIN: 6.8 G/DL (ref 6.4–8.2)
TRIGL SERPL-MCNC: 231 MG/DL (ref 0–150)
URIC ACID, SERUM: 7.3 MG/DL (ref 2.6–6)
VITAMIN B-12: 908 PG/ML (ref 211–911)
VLDLC SERPL CALC-MCNC: 46 MG/DL
WBC # BLD: 8.8 K/UL (ref 4–11)

## 2018-04-03 PROCEDURE — 99214 OFFICE O/P EST MOD 30 MIN: CPT | Performed by: FAMILY MEDICINE

## 2018-04-03 PROCEDURE — 3014F SCREEN MAMMO DOC REV: CPT | Performed by: FAMILY MEDICINE

## 2018-04-03 PROCEDURE — G8417 CALC BMI ABV UP PARAM F/U: HCPCS | Performed by: FAMILY MEDICINE

## 2018-04-03 PROCEDURE — 3017F COLORECTAL CA SCREEN DOC REV: CPT | Performed by: FAMILY MEDICINE

## 2018-04-03 PROCEDURE — 1090F PRES/ABSN URINE INCON ASSESS: CPT | Performed by: FAMILY MEDICINE

## 2018-04-03 PROCEDURE — 1123F ACP DISCUSS/DSCN MKR DOCD: CPT | Performed by: FAMILY MEDICINE

## 2018-04-03 PROCEDURE — 1036F TOBACCO NON-USER: CPT | Performed by: FAMILY MEDICINE

## 2018-04-03 PROCEDURE — 4040F PNEUMOC VAC/ADMIN/RCVD: CPT | Performed by: FAMILY MEDICINE

## 2018-04-03 PROCEDURE — G8427 DOCREV CUR MEDS BY ELIG CLIN: HCPCS | Performed by: FAMILY MEDICINE

## 2018-04-03 PROCEDURE — G8399 PT W/DXA RESULTS DOCUMENT: HCPCS | Performed by: FAMILY MEDICINE

## 2018-04-03 RX ORDER — ESCITALOPRAM OXALATE 10 MG/1
TABLET ORAL
Qty: 30 TABLET | Refills: 5 | Status: SHIPPED | OUTPATIENT
Start: 2018-04-03 | End: 2018-10-03 | Stop reason: SDUPTHER

## 2018-04-03 RX ORDER — METFORMIN HYDROCHLORIDE 500 MG/1
TABLET, EXTENDED RELEASE ORAL
Qty: 30 TABLET | Refills: 5 | Status: SHIPPED | OUTPATIENT
Start: 2018-04-03 | End: 2018-04-04 | Stop reason: SDUPTHER

## 2018-04-03 RX ORDER — GABAPENTIN 300 MG/1
CAPSULE ORAL
Qty: 270 CAPSULE | Refills: 1 | Status: SHIPPED | OUTPATIENT
Start: 2018-04-03 | End: 2018-09-01 | Stop reason: SDUPTHER

## 2018-04-03 ASSESSMENT — ENCOUNTER SYMPTOMS
SHORTNESS OF BREATH: 0
DIARRHEA: 0
CHEST TIGHTNESS: 0
COUGH: 0
WHEEZING: 0

## 2018-04-04 LAB
ESTIMATED AVERAGE GLUCOSE: 125.5 MG/DL
HBA1C MFR BLD: 6 %

## 2018-04-16 ENCOUNTER — OFFICE VISIT (OUTPATIENT)
Dept: SLEEP MEDICINE | Age: 73
End: 2018-04-16

## 2018-04-16 VITALS
SYSTOLIC BLOOD PRESSURE: 124 MMHG | TEMPERATURE: 98.8 F | BODY MASS INDEX: 40.05 KG/M2 | OXYGEN SATURATION: 94 % | HEIGHT: 65 IN | RESPIRATION RATE: 16 BRPM | DIASTOLIC BLOOD PRESSURE: 80 MMHG | WEIGHT: 240.4 LBS | HEART RATE: 63 BPM

## 2018-04-16 DIAGNOSIS — G47.33 OSA ON CPAP: Primary | ICD-10-CM

## 2018-04-16 DIAGNOSIS — Z99.89 OSA ON CPAP: Primary | ICD-10-CM

## 2018-04-16 PROCEDURE — 1090F PRES/ABSN URINE INCON ASSESS: CPT | Performed by: PSYCHIATRY & NEUROLOGY

## 2018-04-16 PROCEDURE — G8427 DOCREV CUR MEDS BY ELIG CLIN: HCPCS | Performed by: PSYCHIATRY & NEUROLOGY

## 2018-04-16 PROCEDURE — 1123F ACP DISCUSS/DSCN MKR DOCD: CPT | Performed by: PSYCHIATRY & NEUROLOGY

## 2018-04-16 PROCEDURE — 3014F SCREEN MAMMO DOC REV: CPT | Performed by: PSYCHIATRY & NEUROLOGY

## 2018-04-16 PROCEDURE — 1036F TOBACCO NON-USER: CPT | Performed by: PSYCHIATRY & NEUROLOGY

## 2018-04-16 PROCEDURE — 99213 OFFICE O/P EST LOW 20 MIN: CPT | Performed by: PSYCHIATRY & NEUROLOGY

## 2018-04-16 PROCEDURE — G8399 PT W/DXA RESULTS DOCUMENT: HCPCS | Performed by: PSYCHIATRY & NEUROLOGY

## 2018-04-16 PROCEDURE — 4040F PNEUMOC VAC/ADMIN/RCVD: CPT | Performed by: PSYCHIATRY & NEUROLOGY

## 2018-04-16 PROCEDURE — 3017F COLORECTAL CA SCREEN DOC REV: CPT | Performed by: PSYCHIATRY & NEUROLOGY

## 2018-04-16 PROCEDURE — G8417 CALC BMI ABV UP PARAM F/U: HCPCS | Performed by: PSYCHIATRY & NEUROLOGY

## 2018-04-16 ASSESSMENT — SLEEP AND FATIGUE QUESTIONNAIRES
HOW LIKELY ARE YOU TO NOD OFF OR FALL ASLEEP WHEN YOU ARE A PASSENGER IN A CAR FOR AN HOUR WITHOUT A BREAK: 2
HOW LIKELY ARE YOU TO NOD OFF OR FALL ASLEEP WHILE SITTING AND TALKING TO SOMEONE: 0
HOW LIKELY ARE YOU TO NOD OFF OR FALL ASLEEP WHILE SITTING AND READING: 1
ESS TOTAL SCORE: 7
HOW LIKELY ARE YOU TO NOD OFF OR FALL ASLEEP WHILE SITTING QUIETLY AFTER LUNCH WITHOUT ALCOHOL: 0
HOW LIKELY ARE YOU TO NOD OFF OR FALL ASLEEP IN A CAR, WHILE STOPPED FOR A FEW MINUTES IN TRAFFIC: 0
HOW LIKELY ARE YOU TO NOD OFF OR FALL ASLEEP WHILE SITTING INACTIVE IN A PUBLIC PLACE: 0
HOW LIKELY ARE YOU TO NOD OFF OR FALL ASLEEP WHILE LYING DOWN TO REST IN THE AFTERNOON WHEN CIRCUMSTANCES PERMIT: 2
HOW LIKELY ARE YOU TO NOD OFF OR FALL ASLEEP WHILE WATCHING TV: 2

## 2018-04-16 ASSESSMENT — ENCOUNTER SYMPTOMS
CHOKING: 0
ALLERGIC/IMMUNOLOGIC NEGATIVE: 1
GASTROINTESTINAL NEGATIVE: 1
APNEA: 0
EYES NEGATIVE: 1

## 2018-04-20 ENCOUNTER — HOSPITAL ENCOUNTER (OUTPATIENT)
Dept: VASCULAR LAB | Age: 73
Discharge: HOME | End: 2018-04-20
Attending: FAMILY MEDICINE | Admitting: FAMILY MEDICINE

## 2018-04-20 ENCOUNTER — OFFICE VISIT (OUTPATIENT)
Dept: INTERNAL MEDICINE CLINIC | Age: 73
End: 2018-04-20

## 2018-04-20 ENCOUNTER — TELEPHONE (OUTPATIENT)
Dept: INTERNAL MEDICINE CLINIC | Age: 73
End: 2018-04-20

## 2018-04-20 VITALS
SYSTOLIC BLOOD PRESSURE: 128 MMHG | DIASTOLIC BLOOD PRESSURE: 84 MMHG | HEIGHT: 65 IN | WEIGHT: 236.2 LBS | HEART RATE: 68 BPM | BODY MASS INDEX: 39.35 KG/M2

## 2018-04-20 DIAGNOSIS — M79.661 RIGHT CALF PAIN: Primary | ICD-10-CM

## 2018-04-20 DIAGNOSIS — M79.661 PAIN OF RIGHT LOWER LEG: ICD-10-CM

## 2018-04-20 DIAGNOSIS — M79.89 RIGHT LEG SWELLING: ICD-10-CM

## 2018-04-20 PROCEDURE — G8417 CALC BMI ABV UP PARAM F/U: HCPCS | Performed by: FAMILY MEDICINE

## 2018-04-20 PROCEDURE — G8399 PT W/DXA RESULTS DOCUMENT: HCPCS | Performed by: FAMILY MEDICINE

## 2018-04-20 PROCEDURE — 99213 OFFICE O/P EST LOW 20 MIN: CPT | Performed by: FAMILY MEDICINE

## 2018-04-20 PROCEDURE — 3017F COLORECTAL CA SCREEN DOC REV: CPT | Performed by: FAMILY MEDICINE

## 2018-04-20 PROCEDURE — 1036F TOBACCO NON-USER: CPT | Performed by: FAMILY MEDICINE

## 2018-04-20 PROCEDURE — G8427 DOCREV CUR MEDS BY ELIG CLIN: HCPCS | Performed by: FAMILY MEDICINE

## 2018-04-20 PROCEDURE — 1123F ACP DISCUSS/DSCN MKR DOCD: CPT | Performed by: FAMILY MEDICINE

## 2018-04-20 PROCEDURE — 1090F PRES/ABSN URINE INCON ASSESS: CPT | Performed by: FAMILY MEDICINE

## 2018-04-20 PROCEDURE — 4040F PNEUMOC VAC/ADMIN/RCVD: CPT | Performed by: FAMILY MEDICINE

## 2018-04-22 ASSESSMENT — ENCOUNTER SYMPTOMS
SHORTNESS OF BREATH: 0
COUGH: 0
CHEST TIGHTNESS: 0
COLOR CHANGE: 0

## 2018-09-04 RX ORDER — GABAPENTIN 300 MG/1
CAPSULE ORAL
Qty: 270 CAPSULE | Refills: 0 | Status: SHIPPED | OUTPATIENT
Start: 2018-09-04 | End: 2018-12-11 | Stop reason: SDUPTHER

## 2018-10-03 ENCOUNTER — OFFICE VISIT (OUTPATIENT)
Dept: INTERNAL MEDICINE CLINIC | Age: 73
End: 2018-10-03
Payer: MEDICARE

## 2018-10-03 VITALS
HEIGHT: 65 IN | WEIGHT: 227 LBS | HEART RATE: 70 BPM | DIASTOLIC BLOOD PRESSURE: 70 MMHG | BODY MASS INDEX: 37.82 KG/M2 | SYSTOLIC BLOOD PRESSURE: 118 MMHG

## 2018-10-03 DIAGNOSIS — Z51.81 MEDICATION MONITORING ENCOUNTER: ICD-10-CM

## 2018-10-03 DIAGNOSIS — R42 ORTHOSTATIC DIZZINESS: ICD-10-CM

## 2018-10-03 DIAGNOSIS — E78.2 MIXED HYPERLIPIDEMIA: ICD-10-CM

## 2018-10-03 DIAGNOSIS — I10 ESSENTIAL HYPERTENSION: ICD-10-CM

## 2018-10-03 DIAGNOSIS — F41.1 GENERALIZED ANXIETY DISORDER: ICD-10-CM

## 2018-10-03 DIAGNOSIS — N18.30 CHRONIC KIDNEY DISEASE (CKD), STAGE III (MODERATE) (HCC): Primary | ICD-10-CM

## 2018-10-03 DIAGNOSIS — R73.02 IMPAIRED GLUCOSE TOLERANCE: ICD-10-CM

## 2018-10-03 LAB
A/G RATIO: 1.6 (ref 1.1–2.2)
ALBUMIN SERPL-MCNC: 4.4 G/DL (ref 3.4–5)
ALP BLD-CCNC: 73 U/L (ref 40–129)
ALT SERPL-CCNC: 10 U/L (ref 10–40)
ANION GAP SERPL CALCULATED.3IONS-SCNC: 18 MMOL/L (ref 3–16)
AST SERPL-CCNC: 12 U/L (ref 15–37)
BASOPHILS ABSOLUTE: 0.1 K/UL (ref 0–0.2)
BASOPHILS RELATIVE PERCENT: 0.9 %
BILIRUB SERPL-MCNC: 0.6 MG/DL (ref 0–1)
BUN BLDV-MCNC: 15 MG/DL (ref 7–20)
CALCIUM SERPL-MCNC: 9.4 MG/DL (ref 8.3–10.6)
CHLORIDE BLD-SCNC: 99 MMOL/L (ref 99–110)
CHOLESTEROL, TOTAL: 169 MG/DL (ref 0–199)
CO2: 25 MMOL/L (ref 21–32)
CREAT SERPL-MCNC: 1 MG/DL (ref 0.6–1.2)
EOSINOPHILS ABSOLUTE: 0.2 K/UL (ref 0–0.6)
EOSINOPHILS RELATIVE PERCENT: 1.6 %
GFR AFRICAN AMERICAN: >60
GFR NON-AFRICAN AMERICAN: 54
GLOBULIN: 2.7 G/DL
GLUCOSE BLD-MCNC: 119 MG/DL (ref 70–99)
HCT VFR BLD CALC: 45.7 % (ref 36–48)
HDLC SERPL-MCNC: 45 MG/DL (ref 40–60)
HEMOGLOBIN: 15.1 G/DL (ref 12–16)
LDL CHOLESTEROL CALCULATED: 88 MG/DL
LYMPHOCYTES ABSOLUTE: 1.4 K/UL (ref 1–5.1)
LYMPHOCYTES RELATIVE PERCENT: 14.7 %
MCH RBC QN AUTO: 30.1 PG (ref 26–34)
MCHC RBC AUTO-ENTMCNC: 33 G/DL (ref 31–36)
MCV RBC AUTO: 91.1 FL (ref 80–100)
MONOCYTES ABSOLUTE: 0.9 K/UL (ref 0–1.3)
MONOCYTES RELATIVE PERCENT: 9 %
NEUTROPHILS ABSOLUTE: 7.2 K/UL (ref 1.7–7.7)
NEUTROPHILS RELATIVE PERCENT: 73.8 %
PDW BLD-RTO: 13.9 % (ref 12.4–15.4)
PLATELET # BLD: 265 K/UL (ref 135–450)
PMV BLD AUTO: 7.6 FL (ref 5–10.5)
POTASSIUM SERPL-SCNC: 4.3 MMOL/L (ref 3.5–5.1)
RBC # BLD: 5.02 M/UL (ref 4–5.2)
SODIUM BLD-SCNC: 142 MMOL/L (ref 136–145)
TOTAL PROTEIN: 7.1 G/DL (ref 6.4–8.2)
TRIGL SERPL-MCNC: 180 MG/DL (ref 0–150)
URIC ACID, SERUM: 7 MG/DL (ref 2.6–6)
VLDLC SERPL CALC-MCNC: 36 MG/DL
WBC # BLD: 9.8 K/UL (ref 4–11)

## 2018-10-03 PROCEDURE — 1101F PT FALLS ASSESS-DOCD LE1/YR: CPT | Performed by: FAMILY MEDICINE

## 2018-10-03 PROCEDURE — G8482 FLU IMMUNIZE ORDER/ADMIN: HCPCS | Performed by: FAMILY MEDICINE

## 2018-10-03 PROCEDURE — G8427 DOCREV CUR MEDS BY ELIG CLIN: HCPCS | Performed by: FAMILY MEDICINE

## 2018-10-03 PROCEDURE — G8417 CALC BMI ABV UP PARAM F/U: HCPCS | Performed by: FAMILY MEDICINE

## 2018-10-03 PROCEDURE — 3017F COLORECTAL CA SCREEN DOC REV: CPT | Performed by: FAMILY MEDICINE

## 2018-10-03 PROCEDURE — 99214 OFFICE O/P EST MOD 30 MIN: CPT | Performed by: FAMILY MEDICINE

## 2018-10-03 PROCEDURE — 1090F PRES/ABSN URINE INCON ASSESS: CPT | Performed by: FAMILY MEDICINE

## 2018-10-03 PROCEDURE — 4040F PNEUMOC VAC/ADMIN/RCVD: CPT | Performed by: FAMILY MEDICINE

## 2018-10-03 PROCEDURE — 1123F ACP DISCUSS/DSCN MKR DOCD: CPT | Performed by: FAMILY MEDICINE

## 2018-10-03 PROCEDURE — G8399 PT W/DXA RESULTS DOCUMENT: HCPCS | Performed by: FAMILY MEDICINE

## 2018-10-03 PROCEDURE — 1036F TOBACCO NON-USER: CPT | Performed by: FAMILY MEDICINE

## 2018-10-03 RX ORDER — LOSARTAN POTASSIUM AND HYDROCHLOROTHIAZIDE 12.5; 5 MG/1; MG/1
TABLET ORAL
Qty: 90 TABLET | Refills: 3 | Status: CANCELLED | OUTPATIENT
Start: 2018-10-03

## 2018-10-03 RX ORDER — SIMVASTATIN 20 MG
TABLET ORAL
Qty: 30 TABLET | Refills: 5 | Status: SHIPPED | OUTPATIENT
Start: 2018-10-03 | End: 2019-01-31

## 2018-10-03 RX ORDER — AMLODIPINE BESYLATE 2.5 MG/1
2.5 TABLET ORAL DAILY
Qty: 30 TABLET | Refills: 5 | Status: SHIPPED | OUTPATIENT
Start: 2018-10-03 | End: 2019-02-03 | Stop reason: SDUPTHER

## 2018-10-03 RX ORDER — BUPROPION HYDROCHLORIDE 150 MG/1
TABLET ORAL
Qty: 90 TABLET | Refills: 3 | Status: SHIPPED | OUTPATIENT
Start: 2018-10-03 | End: 2019-11-22 | Stop reason: SDUPTHER

## 2018-10-03 RX ORDER — LOSARTAN POTASSIUM 50 MG/1
50 TABLET ORAL DAILY
Qty: 30 TABLET | Refills: 5 | Status: SHIPPED | OUTPATIENT
Start: 2018-10-03 | End: 2019-02-03 | Stop reason: SDUPTHER

## 2018-10-03 RX ORDER — ESCITALOPRAM OXALATE 10 MG/1
TABLET ORAL
Qty: 30 TABLET | Refills: 5 | Status: SHIPPED | OUTPATIENT
Start: 2018-10-03 | End: 2018-11-02

## 2018-10-03 ASSESSMENT — PATIENT HEALTH QUESTIONNAIRE - PHQ9
2. FEELING DOWN, DEPRESSED OR HOPELESS: 0
SUM OF ALL RESPONSES TO PHQ9 QUESTIONS 1 & 2: 0
SUM OF ALL RESPONSES TO PHQ QUESTIONS 1-9: 0
1. LITTLE INTEREST OR PLEASURE IN DOING THINGS: 0
SUM OF ALL RESPONSES TO PHQ QUESTIONS 1-9: 0

## 2018-10-03 NOTE — PROGRESS NOTES
Subjective:      Patient ID: Jono Ceron is a 74 y.o. female. HPI   Chief Complaint   Patient presents with    6 Month Follow-Up     fasting today     FU of HTN, IGT, HLP, GERD    Dizzy when she stands up. Not sure if it is from not eating. Woozy. She does notice it when getting out of bed, but most of the time when sitting in the chair and then goes to stand up. Left leg does swell more than right. Different than positional vertigo --- had this in the past.    Otherwise doing well and feeling fine. Patient Active Problem List   Diagnosis    Osteoarthritis    GERD (gastroesophageal reflux disease)    Mixed hyperlipidemia    TAZ on CPAP    Impaired glucose tolerance    Hypertension    Generalized anxiety disorder    Lipoma of skin    Myalgia and myositis    Chronic kidney disease (CKD), stage III (moderate) (HCC)    Periodic limb movement disorder    Obesity, severe (BMI >= 40) (HCC)    BREWSTER (dyspnea on exertion)    Lipoma    Ganglion of wrist    Morbid obesity due to excess calories [facility])         Outpatient Prescriptions Marked as Taking for the 10/3/18 encounter (Office Visit) with Pascale Martinez MD   Medication Sig Dispense Refill    gabapentin (NEURONTIN) 300 MG capsule TAKE ONE CAPSULE BY MOUTH THREE TIMES DAILY. 270 capsule 0    metFORMIN (GLUCOPHAGE-XR) 500 MG extended release tablet TAKE 1 TABLET DAILY: END OF BREAKFAST OR AT BEDTIME.  90 tablet 3    escitalopram (LEXAPRO) 10 MG tablet TAKE 1 TABLET BY MOUTH DAILY 30 tablet 5    simvastatin (ZOCOR) 40 MG tablet TAKE 1 TABLET BY MOUTH NIGHTLY 30 tablet 11    buPROPion (WELLBUTRIN XL) 150 MG extended release tablet TAKE ONE TABLET BY MOUTH ONE TIME DAILY 90 tablet 3    losartan-hydrochlorothiazide (HYZAAR) 50-12.5 MG per tablet TAKE ONE TABLET BY MOUTH DAILY 90 tablet 3    diphenhydrAMINE (BENADRYL) 25 MG capsule Take 25 mg by mouth nightly as needed for Itching      omeprazole (PRILOSEC) 20 MG capsule Take 20 mg by mouth daily.  cetirizine (ZYRTEC) 10 MG tablet Take 10 mg by mouth daily. Social History   Substance Use Topics    Smoking status: Never Smoker    Smokeless tobacco: Never Used    Alcohol use Yes      Comment: rarely       Review of Systems    Objective:   Physical Exam   Constitutional: She is oriented to person, place, and time. She appears well-developed and well-nourished. No distress. Eyes: Conjunctivae are normal. No scleral icterus. Neck: Normal range of motion. Neck supple. Carotid bruit is not present. No thyroid mass and no thyromegaly present. Cardiovascular: Normal rate, regular rhythm, S1 normal, S2 normal, normal heart sounds and intact distal pulses. No murmur heard. Pulmonary/Chest: Effort normal and breath sounds normal. No respiratory distress. She has no decreased breath sounds. She has no wheezes. She has no rhonchi. She has no rales. Abdominal: Soft. Normal appearance and bowel sounds are normal. She exhibits no abdominal bruit and no mass. There is no hepatomegaly. There is no tenderness. Lymphadenopathy:     She has no cervical adenopathy. Neurological: She is alert and oriented to person, place, and time. She displays no tremor. She exhibits normal muscle tone. Coordination and gait normal.   Skin: Skin is warm and dry. She is not diaphoretic. No cyanosis. No pallor. Nails show no clubbing. Psychiatric: She has a normal mood and affect. Her speech is normal and behavior is normal. Cognition and memory are normal.   Vitals reviewed. Orthostatic VSs done (she c/o tight pinching BP cuff --- her arm is tapers to the elbow and had to apply tightly to get it to stay on her arm.   Supine 146/94  Sitting 124/78  Standing 118/70    Wt Readings from Last 3 Encounters:   10/03/18 227 lb (103 kg)   04/20/18 236 lb 3.2 oz (107.1 kg)   04/16/18 240 lb 6.4 oz (109 kg)     Temp Readings from Last 3 Encounters:   04/16/18 98.8 °F (37.1 °C) (Oral)   06/04/15 99 °F (37.2 medication compliance addressed. All patient questions answered.           Lourdes Mcintosh MD

## 2018-10-04 LAB
ESTIMATED AVERAGE GLUCOSE: 131.2 MG/DL
HBA1C MFR BLD: 6.2 %

## 2018-11-02 DIAGNOSIS — F41.1 GENERALIZED ANXIETY DISORDER: ICD-10-CM

## 2018-11-02 RX ORDER — ESCITALOPRAM OXALATE 10 MG/1
TABLET ORAL
Qty: 30 TABLET | Refills: 5 | Status: SHIPPED | OUTPATIENT
Start: 2018-11-02 | End: 2019-11-22 | Stop reason: SDUPTHER

## 2018-11-03 DIAGNOSIS — E78.2 MIXED HYPERLIPIDEMIA: ICD-10-CM

## 2018-11-05 RX ORDER — SIMVASTATIN 40 MG
TABLET ORAL
Qty: 30 TABLET | Refills: 2 | Status: SHIPPED | OUTPATIENT
Start: 2018-11-05 | End: 2019-01-31 | Stop reason: SDUPTHER

## 2018-11-08 NOTE — TELEPHONE ENCOUNTER
Patient just picked up a prescription for Simvastatin. She thinks there must have been some kind of mistake as the pills were Simvastatin 40mg instead of 20 mg. She does not recall Simvastatin being increased by . Please call her and advise. She didn't look at the pills until she got home. She can't take them back since they left the pharmacy.

## 2019-01-31 ENCOUNTER — TELEPHONE (OUTPATIENT)
Dept: INTERNAL MEDICINE CLINIC | Age: 74
End: 2019-01-31

## 2019-01-31 DIAGNOSIS — E78.2 MIXED HYPERLIPIDEMIA: ICD-10-CM

## 2019-01-31 RX ORDER — SIMVASTATIN 20 MG
TABLET ORAL
Qty: 90 TABLET | Refills: 2 | Status: SHIPPED | OUTPATIENT
Start: 2019-01-31 | End: 2019-11-10 | Stop reason: SDUPTHER

## 2019-02-03 DIAGNOSIS — I10 ESSENTIAL HYPERTENSION: ICD-10-CM

## 2019-02-04 RX ORDER — AMLODIPINE BESYLATE 2.5 MG/1
TABLET ORAL
Qty: 30 TABLET | Refills: 4 | Status: SHIPPED | OUTPATIENT
Start: 2019-02-04 | End: 2019-04-28 | Stop reason: SDUPTHER

## 2019-02-04 RX ORDER — LOSARTAN POTASSIUM 50 MG/1
TABLET ORAL
Qty: 30 TABLET | Refills: 4 | Status: SHIPPED | OUTPATIENT
Start: 2019-02-04 | End: 2019-04-28 | Stop reason: SDUPTHER

## 2019-04-03 ENCOUNTER — OFFICE VISIT (OUTPATIENT)
Dept: INTERNAL MEDICINE CLINIC | Age: 74
End: 2019-04-03
Payer: MEDICARE

## 2019-04-03 VITALS
WEIGHT: 238.6 LBS | HEART RATE: 64 BPM | DIASTOLIC BLOOD PRESSURE: 80 MMHG | HEIGHT: 65 IN | BODY MASS INDEX: 39.75 KG/M2 | SYSTOLIC BLOOD PRESSURE: 136 MMHG

## 2019-04-03 DIAGNOSIS — M79.10 MYALGIA: ICD-10-CM

## 2019-04-03 DIAGNOSIS — R73.02 IMPAIRED GLUCOSE TOLERANCE: ICD-10-CM

## 2019-04-03 DIAGNOSIS — E78.2 MIXED HYPERLIPIDEMIA: Primary | ICD-10-CM

## 2019-04-03 DIAGNOSIS — M25.50 ARTHRALGIA, UNSPECIFIED JOINT: ICD-10-CM

## 2019-04-03 DIAGNOSIS — E66.9 OBESITY, CLASS II, BMI 35-39.9: ICD-10-CM

## 2019-04-03 DIAGNOSIS — M15.9 PRIMARY OSTEOARTHRITIS INVOLVING MULTIPLE JOINTS: ICD-10-CM

## 2019-04-03 DIAGNOSIS — N18.30 CHRONIC KIDNEY DISEASE (CKD), STAGE III (MODERATE) (HCC): ICD-10-CM

## 2019-04-03 DIAGNOSIS — I10 ESSENTIAL HYPERTENSION: ICD-10-CM

## 2019-04-03 DIAGNOSIS — K21.9 GASTROESOPHAGEAL REFLUX DISEASE, ESOPHAGITIS PRESENCE NOT SPECIFIED: ICD-10-CM

## 2019-04-03 DIAGNOSIS — Z91.81 AT HIGH RISK FOR FALLS: ICD-10-CM

## 2019-04-03 DIAGNOSIS — F41.1 GENERALIZED ANXIETY DISORDER: ICD-10-CM

## 2019-04-03 LAB
A/G RATIO: 1.7 (ref 1.1–2.2)
ALBUMIN SERPL-MCNC: 4.3 G/DL (ref 3.4–5)
ALP BLD-CCNC: 70 U/L (ref 40–129)
ALT SERPL-CCNC: 12 U/L (ref 10–40)
ANION GAP SERPL CALCULATED.3IONS-SCNC: 13 MMOL/L (ref 3–16)
AST SERPL-CCNC: 12 U/L (ref 15–37)
BASOPHILS ABSOLUTE: 0.1 K/UL (ref 0–0.2)
BASOPHILS RELATIVE PERCENT: 1.2 %
BILIRUB SERPL-MCNC: 0.4 MG/DL (ref 0–1)
BUN BLDV-MCNC: 15 MG/DL (ref 7–20)
C-REACTIVE PROTEIN: 1.9 MG/L (ref 0–5.1)
CALCIUM SERPL-MCNC: 9 MG/DL (ref 8.3–10.6)
CHLORIDE BLD-SCNC: 101 MMOL/L (ref 99–110)
CHOLESTEROL, TOTAL: 175 MG/DL (ref 0–199)
CO2: 27 MMOL/L (ref 21–32)
CREAT SERPL-MCNC: 0.9 MG/DL (ref 0.6–1.2)
EOSINOPHILS ABSOLUTE: 0.2 K/UL (ref 0–0.6)
EOSINOPHILS RELATIVE PERCENT: 3 %
GFR AFRICAN AMERICAN: >60
GFR NON-AFRICAN AMERICAN: >60
GLOBULIN: 2.6 G/DL
GLUCOSE BLD-MCNC: 114 MG/DL (ref 70–99)
HCT VFR BLD CALC: 43.1 % (ref 36–48)
HDLC SERPL-MCNC: 46 MG/DL (ref 40–60)
HEMOGLOBIN: 14.5 G/DL (ref 12–16)
LDL CHOLESTEROL CALCULATED: 89 MG/DL
LYMPHOCYTES ABSOLUTE: 1.6 K/UL (ref 1–5.1)
LYMPHOCYTES RELATIVE PERCENT: 23.7 %
MAGNESIUM: 2.1 MG/DL (ref 1.8–2.4)
MCH RBC QN AUTO: 30.7 PG (ref 26–34)
MCHC RBC AUTO-ENTMCNC: 33.7 G/DL (ref 31–36)
MCV RBC AUTO: 91.1 FL (ref 80–100)
MONOCYTES ABSOLUTE: 0.7 K/UL (ref 0–1.3)
MONOCYTES RELATIVE PERCENT: 9.8 %
NEUTROPHILS ABSOLUTE: 4.2 K/UL (ref 1.7–7.7)
NEUTROPHILS RELATIVE PERCENT: 62.3 %
PDW BLD-RTO: 13.7 % (ref 12.4–15.4)
PLATELET # BLD: 281 K/UL (ref 135–450)
PMV BLD AUTO: 7.5 FL (ref 5–10.5)
POTASSIUM SERPL-SCNC: 4.7 MMOL/L (ref 3.5–5.1)
RBC # BLD: 4.73 M/UL (ref 4–5.2)
SEDIMENTATION RATE, ERYTHROCYTE: 11 MM/HR (ref 0–30)
SODIUM BLD-SCNC: 141 MMOL/L (ref 136–145)
TOTAL CK: 60 U/L (ref 26–192)
TOTAL PROTEIN: 6.9 G/DL (ref 6.4–8.2)
TRIGL SERPL-MCNC: 200 MG/DL (ref 0–150)
TSH REFLEX: 1.11 UIU/ML (ref 0.27–4.2)
URIC ACID, SERUM: 7.9 MG/DL (ref 2.6–6)
VITAMIN B-12: 705 PG/ML (ref 211–911)
VLDLC SERPL CALC-MCNC: 40 MG/DL
WBC # BLD: 6.7 K/UL (ref 4–11)

## 2019-04-03 PROCEDURE — 4040F PNEUMOC VAC/ADMIN/RCVD: CPT | Performed by: FAMILY MEDICINE

## 2019-04-03 PROCEDURE — G8399 PT W/DXA RESULTS DOCUMENT: HCPCS | Performed by: FAMILY MEDICINE

## 2019-04-03 PROCEDURE — 99214 OFFICE O/P EST MOD 30 MIN: CPT | Performed by: FAMILY MEDICINE

## 2019-04-03 PROCEDURE — 1036F TOBACCO NON-USER: CPT | Performed by: FAMILY MEDICINE

## 2019-04-03 PROCEDURE — 3017F COLORECTAL CA SCREEN DOC REV: CPT | Performed by: FAMILY MEDICINE

## 2019-04-03 PROCEDURE — G8427 DOCREV CUR MEDS BY ELIG CLIN: HCPCS | Performed by: FAMILY MEDICINE

## 2019-04-03 PROCEDURE — 1090F PRES/ABSN URINE INCON ASSESS: CPT | Performed by: FAMILY MEDICINE

## 2019-04-03 PROCEDURE — 1123F ACP DISCUSS/DSCN MKR DOCD: CPT | Performed by: FAMILY MEDICINE

## 2019-04-03 PROCEDURE — G8417 CALC BMI ABV UP PARAM F/U: HCPCS | Performed by: FAMILY MEDICINE

## 2019-04-03 ASSESSMENT — PATIENT HEALTH QUESTIONNAIRE - PHQ9
SUM OF ALL RESPONSES TO PHQ QUESTIONS 1-9: 1
SUM OF ALL RESPONSES TO PHQ9 QUESTIONS 1 & 2: 1
1. LITTLE INTEREST OR PLEASURE IN DOING THINGS: 1
2. FEELING DOWN, DEPRESSED OR HOPELESS: 0
SUM OF ALL RESPONSES TO PHQ QUESTIONS 1-9: 1

## 2019-04-03 NOTE — PROGRESS NOTES
Subjective:      Patient ID: Kym Parker is a 74 y.o. female. HPI   Chief Complaint   Patient presents with    6 Month Follow-Up     FU of HLP, IGT, HTN, OA, GERD, obesity BMI just under 40. She says she just does not feel good. Tired and achey. Hurts all over. Feels like she \"just got old\". She sits because she hurts and hurts because she sits. Feeling sleepy  She is using CPAP. She will go next week for recheck on it. Has a new machine. She goes outside to [employer] and garden and up and down the stairs for laundry. No formal exercise. Feels feverish but has no documented temp elevations. Patient Active Problem List   Diagnosis    Osteoarthritis    GERD (gastroesophageal reflux disease)    Mixed hyperlipidemia    TAZ on CPAP    Impaired glucose tolerance    Hypertension    Generalized anxiety disorder    Lipoma of skin    Myalgia and myositis    Chronic kidney disease (CKD), stage III (moderate) (HCC)    Periodic limb movement disorder    Obesity, Class II, BMI 35-39.9    BREWSTER (dyspnea on exertion)    Lipoma    Ganglion of wrist    Morbid obesity due to excess calories (HCC)    Orthostatic dizziness         Outpatient Medications Marked as Taking for the 4/3/19 encounter (Office Visit) with Leyla Andrews MD   Medication Sig Dispense Refill    amLODIPine (NORVASC) 2.5 MG tablet TAKE 1 TABLET BY MOUTH EVERY DAY 30 tablet 4    simvastatin (ZOCOR) 20 MG tablet TAKE 1 TABLET BY MOUTH NIGHTLY 90 tablet 2    escitalopram (LEXAPRO) 10 MG tablet TAKE 1 TABLET BY MOUTH DAILY 30 tablet 5    buPROPion (WELLBUTRIN XL) 150 MG extended release tablet TAKE ONE TABLET BY MOUTH ONE TIME DAILY 90 tablet 3    metFORMIN (GLUCOPHAGE-XR) 500 MG extended release tablet TAKE 1 TABLET DAILY: END OF BREAKFAST OR AT BEDTIME.  90 tablet 3    diphenhydrAMINE (BENADRYL) 25 MG capsule Take 25 mg by mouth nightly as needed for Itching      omeprazole (PRILOSEC) 20 MG capsule Take 20 mg by mouth daily. Social History     Tobacco Use    Smoking status: Never Smoker    Smokeless tobacco: Never Used   Substance Use Topics    Alcohol use: Yes     Comment: rarely    Drug use: Not on file         Review of Systems   Constitutional: Positive for fatigue. Respiratory: Negative for cough, chest tightness, shortness of breath and wheezing. Cardiovascular: Negative for chest pain, palpitations and leg swelling. Musculoskeletal: Positive for arthralgias and myalgias. Negative for joint swelling. Skin: Negative for rash and wound. Neurological: Negative for dizziness, syncope, facial asymmetry, speech difficulty, weakness, numbness and headaches. Objective:   Physical Exam   Constitutional: She is oriented to person, place, and time. She appears well-developed and well-nourished. No distress. Eyes: Conjunctivae are normal. No scleral icterus. Neck: Normal range of motion. Neck supple. Carotid bruit is not present. No thyroid mass and no thyromegaly present. Cardiovascular: Normal rate, regular rhythm, S1 normal, S2 normal, normal heart sounds and intact distal pulses. No murmur heard. Pulmonary/Chest: Effort normal and breath sounds normal. No respiratory distress. She has no decreased breath sounds. She has no wheezes. She has no rhonchi. She has no rales. Abdominal: Soft. Normal appearance and bowel sounds are normal. She exhibits no abdominal bruit and no mass. There is no hepatomegaly. There is no tenderness. Lymphadenopathy:     She has no cervical adenopathy. Neurological: She is alert and oriented to person, place, and time. She displays no tremor. She exhibits normal muscle tone. Coordination and gait normal.   Skin: Skin is warm and dry. She is not diaphoretic. No cyanosis. No pallor. Nails show no clubbing. Psychiatric: She has a normal mood and affect. Her speech is normal and behavior is normal. Cognition and memory are normal.   Vitals reviewed. Wt Readings from Last 3 Encounters:   04/03/19 238 lb 9.6 oz (108.2 kg)   10/03/18 227 lb (103 kg)   04/20/18 236 lb 3.2 oz (107.1 kg)     Temp Readings from Last 3 Encounters:   04/16/18 98.8 °F (37.1 °C) (Oral)   06/04/15 99 °F (37.2 °C) (Oral)   05/15/15 98.2 °F (36.8 °C) (Oral)     BP Readings from Last 3 Encounters:   04/03/19 136/80   10/03/18 118/70   04/20/18 128/84     Pulse Readings from Last 3 Encounters:   04/03/19 64   10/03/18 70   04/20/18 68         Assessment:      1. Mixed hyperlipidemia  On statin. Consider it the cause of her myalgia if no other explanation.   - Comprehensive Metabolic Panel  - Lipid Panel    2. Essential hypertension  BP: 136/80   At goal and meeting medical guidelines. Continue treatment. - Comprehensive Metabolic Panel    3. Obesity, Class II, BMI 35-39.9  Diet discussed and hand out on Mediterranean diet given. 4. Generalized anxiety disorder  On Lexapro 10 mg daily and doing OK. 5. Primary osteoarthritis involving multiple joints  Suggest using APAP more regularly. 6. Myalgia  R/o autoimmune cause or elevated CK  - CBC Auto Differential  - TSH with Reflex  - C-Reactive Protein  - Cyclic Citrul Peptide Antibody, IgG  - GAIL  - CK  - Sedimentation Rate    7. Chronic kidney disease (CKD), stage III (moderate) ([facility])  Avoid NSAIDs.   - CBC Auto Differential  - Uric Acid    8. Impaired glucose tolerance  Diet and weight loss. Exercise. 9. Gastroesophageal reflux disease, esophagitis presence not specified  On PPI. Monitor   - Magnesium  - Vitamin B12    10. At high risk for falls  She has clumsy falls. 11. Arthralgia, unspecified joint  - C-Reactive Protein  - Cyclic Citrul Peptide Antibody, IgG  - GAIL  - CK  - Sedimentation Rate          Plan:      See orders. See after visit summary, patient instructions, and reference hand-outs. A PRINTED SUMMARY = AVS GIVEN TO THE PATIENT. Discussed use, benefit, and side effects of prescribed medications. Barriers to medication compliance addressed. All patient questions answered. Fredy Dong MD  On the basis of positive falls risk screening, assessment and plan is as follows: in-office gait and balance testing performed using The Timed Up and Go Test was negative for increased falls risk- no further intervention is currently indicated, home safety tips provided.

## 2019-04-03 NOTE — PATIENT INSTRUCTIONS
Spread the gabapentin out to 3 times a day ---first thing in the AM, lunch and at night. STARTING AN EXERCISE PROGRAM WHEN YOU HAVE ARTHRITIS, FIBROMYALGIA OR OTHER JOINT/ MUSCLE PAIN. Decide on an exercise that is enjoyable or at least not dreaded. Examples: Walking, bicycle, elliptical machine, treadmill, swimming, water aerobics, rowing machine. You need to work hard enough to be slightly short of breath =  where you could still talk a bit but cannot sing. If you can sing, you should up the exertion. Day 1: 5 minutes. (IF walking: Walking for 2.5 minutes up and turn around for 2.5 minutes back)  Day 2: Skip a day  Day 3: 6 minutes  Day 4: Skip a day  Day 5: 7 minutes  Day 6: Skip a day  Day 7: 8 minutes  ETC. Until you have worked up to a period of 25-30 minutes at least every other day. After a full month of every other day, it is OK to exercise 5-6 days per week but if you are doing \"aggressive\" cardio exercise with a high level of exertion, it is best to limit to every other day and to do something more moderate the other 2-3 days of the week. Patient Education   Avoid processed foods,  [place] meats. Learning About the [address] Diet  What is the Mediterranean diet? The Mediterranean diet is a style of eating rather than a diet plan. It features foods eaten in [place], [place], [place] and [place], and other countries along the [facility]. It emphasizes eating foods like fish, fruits, vegetables, beans, high-fiber breads and whole grains, nuts, and olive oil. This style of eating includes limited red meat, cheese, and sweets. Why choose the Mediterranean diet? A Mediterranean-style diet may improve heart health. It contains more fat than other heart-healthy diets. But the fats are mainly from nuts, unsaturated oils (such as fish oils and olive oil), and certain nut or seed oils (such as canola, soybean, or flaxseed oil).  These fats may help protect the heart and blood vessels. How can you get started on the Mediterranean diet? Here are some things you can do to switch to a more Mediterranean way of eating. What to eat  · Eat a variety of fruits and vegetables each day, such as grapes, blueberries, tomatoes, broccoli, peppers, figs, olives, spinach, eggplant, beans, lentils, and chickpeas. · Eat a variety of whole-grain foods each day, such as oats, brown rice, and whole wheat bread, pasta, and couscous. · Eat fish at least 2 times a week. Try tuna, salmon, mackerel, lake trout, herring, or sardines. · Eat moderate amounts of low-fat dairy products, such as milk, cheese, or yogurt. · Eat moderate amounts of poultry and eggs. · Choose healthy (unsaturated) fats, such as nuts, olive oil, and certain nut or seed oils like canola, soybean, and flaxseed. · Limit unhealthy (saturated) fats, such as butter, palm oil, and coconut oil. And limit fats found in animal products, such as meat and dairy products made with whole milk. Try to eat red meat only a few times a month in very small amounts. · Limit sweets and desserts to only a few times a week. This includes sugar-sweetened drinks like soda. The Mediterranean diet may also include red wine with your meal--1 glass each day for women and up to 2 glasses a day for men. Tips for eating at home  · Use herbs, spices, garlic, lemon zest, and citrus juice instead of salt to add flavor to foods. · Add avocado slices to your sandwich instead of chou. · Have fish for lunch or dinner instead of red meat. Brush the fish with olive oil, and broil or grill it. · Sprinkle your salad with seeds or nuts instead of cheese. · Cook with olive or canola oil instead of butter or oils that are high in saturated fat. · Switch from 2% milk or whole milk to 1% or fat-free milk.   · Dip raw vegetables in a vinaigrette dressing or hummus instead of dips made from mayonnaise or sour cream.  · Have a piece of fruit for dessert instead of a piece of cake. Try baked apples, or have some dried fruit. Tips for eating out  · Try broiled, grilled, baked, or poached fish instead of having it fried or breaded. · Ask your  to have your meals prepared with olive oil instead of butter. · Order dishes made with marinara sauce or sauces made from olive oil. Avoid sauces made from cream or mayonnaise. · Choose whole-grain breads, whole wheat pasta and pizza crust, brown rice, beans, and lentils. · Cut back on butter or margarine on bread. Instead, you can dip your bread in a small amount of olive oil. · Ask for a side salad or grilled vegetables instead of french fries or chips. Where can you learn more? Go to https://North[employer] Technologiesalineeb.Trendy Mondays. org and sign in to your [employer] account. Enter [phone] in the [facility] box to learn more about \"Learning About the Mediterranean Diet. \"     If you do not have an account, please click on the \"Sign Up Now\" link. Current as of: March 28, 2018  Content Version: 11.9  © 9346-8755 [employer]. Care instructions adapted under license by [facility] [place] ([facility]). If you have questions about a medical condition or this instruction, always ask your healthcare professional. [address] any warranty or liability for your use of this information. Patient Education        Knee Arthritis: Exercises  Your Care Instructions  Here are some examples of exercises for knee arthritis. Start each exercise slowly. Ease off the exercise if you start to have pain. Your doctor or physical therapist will tell you when you can start these exercises and which ones will work best for you. How to do the exercises  Knee flexion with heel slide    1. Lie on your back with your knees bent. 2. Slide your heel back by bending your affected knee as far as you can. Then hook your other foot around your ankle to help pull your heel even farther back.   3. Hold for about 6 seconds, then rest for up 1 through 4, even if only one knee is sore. Quadriceps stretch (facedown)    1. Lie flat on your stomach, and rest your face on the floor. 2. Wrap a towel or belt strap around the lower part of your affected leg. Then use the towel or belt strap to slowly pull your heel toward your buttock until you feel a stretch. 3. Hold for about 15 to 30 seconds, then relax your leg against the towel or belt strap. 4. Repeat 2 to 4 times. 5. Switch legs and repeat steps 1 through 4, even if only one knee is sore. Stationary exercise bike    1. If you do not have a stationary exercise bike at home, you can find one to ride at your local health club or community center. 2. Adjust the height of the bike seat so that your knee is slightly bent when your leg is extended downward. If your knee hurts when the pedal reaches the top, you can raise the seat so that your knee does not bend as much. 3. Start slowly. At first, try to do 5 to 10 minutes of cycling with little to no resistance. Then increase your time and the resistance bit by bit until you can do 20 to 30 minutes without pain. 4. If you start to have pain, rest your knee until your pain gets back to the level that is normal for you. Or cycle for less time or with less effort. Follow-up care is a key part of your treatment and safety. Be sure to make and go to all appointments, and call your doctor if you are having problems. It's also a good idea to know your test results and keep a list of the medicines you take. Where can you learn more? Go to https://[employer] PharmaceuticalsalinePlanZap."[employer]". org and sign in to your [employer] account. Enter C159 in the [employer] box to learn more about \"Knee Arthritis: Exercises. \"     If you do not have an account, please click on the \"Sign Up Now\" link. Current as of: September 20, 2018  Content Version: 11.9  © 4229-2020 [employer]. Care instructions adapted under license by [facility] ([facility]).  If you have questions about a medical condition or this instruction, always ask your healthcare professional. [address] any warranty or liability for your use of this information. Patient Education        Back Stretches: Exercises  Your Care Instructions  Here are some examples of exercises for stretching your back. Start each exercise slowly. Ease off the exercise if you start to have pain. Your doctor or physical therapist will tell you when you can start these exercises and which ones will work best for you. How to do the exercises  Overhead stretch    1. Stand comfortably with your feet shoulder-width apart. 2. Looking straight ahead, raise both arms over your head and reach toward the ceiling. Do not allow your head to tilt back. 3. Hold for 15 to 30 seconds, then lower your arms to your sides. 4. Repeat 2 to 4 times. Side stretch    1. Stand comfortably with your feet shoulder-width apart. 2. Raise one arm over your head, and then lean to the other side. 3. Slide your hand down your leg as you let the weight of your arm gently stretch your side muscles. Hold for 15 to 30 seconds. 4. Repeat 2 to 4 times on each side. Press-up    1. Lie on your stomach, supporting your body with your forearms. 2. Press your elbows down into the floor to raise your upper back. As you do this, relax your stomach muscles and allow your back to arch without using your back muscles. As your press up, do not let your hips or pelvis come off the floor. 3. Hold for 15 to 30 seconds, then relax. 4. Repeat 2 to 4 times. Relax and rest    1. Lie on your back with a rolled towel under your neck and a pillow under your knees. Extend your arms comfortably to your sides. 2. Relax and breathe normally. 3. Remain in this position for about 10 minutes. 4. If you can, do this 2 or 3 times each day. Follow-up care is a key part of your treatment and safety.  Be sure to make and go to all appointments,

## 2019-04-04 LAB — ANTI-NUCLEAR ANTIBODY (ANA): NEGATIVE

## 2019-04-05 LAB — CCP IGG ANTIBODIES: 4 UNITS (ref 0–19)

## 2019-04-07 ASSESSMENT — ENCOUNTER SYMPTOMS
COUGH: 0
WHEEZING: 0
SHORTNESS OF BREATH: 0
CHEST TIGHTNESS: 0

## 2019-04-16 ENCOUNTER — OFFICE VISIT (OUTPATIENT)
Dept: SLEEP MEDICINE | Age: 74
End: 2019-04-16
Payer: MEDICARE

## 2019-04-16 VITALS
BODY MASS INDEX: 39.15 KG/M2 | SYSTOLIC BLOOD PRESSURE: 122 MMHG | HEART RATE: 74 BPM | WEIGHT: 235 LBS | OXYGEN SATURATION: 96 % | DIASTOLIC BLOOD PRESSURE: 84 MMHG | HEIGHT: 65 IN | TEMPERATURE: 98.6 F | RESPIRATION RATE: 16 BRPM

## 2019-04-16 DIAGNOSIS — Z99.89 DEPENDENCE ON OTHER ENABLING MACHINES AND DEVICES: ICD-10-CM

## 2019-04-16 DIAGNOSIS — Z99.89 OSA ON CPAP: Primary | ICD-10-CM

## 2019-04-16 DIAGNOSIS — G47.33 OSA ON CPAP: Primary | ICD-10-CM

## 2019-04-16 PROCEDURE — 99213 OFFICE O/P EST LOW 20 MIN: CPT | Performed by: PSYCHIATRY & NEUROLOGY

## 2019-04-16 PROCEDURE — G8417 CALC BMI ABV UP PARAM F/U: HCPCS | Performed by: PSYCHIATRY & NEUROLOGY

## 2019-04-16 PROCEDURE — 4040F PNEUMOC VAC/ADMIN/RCVD: CPT | Performed by: PSYCHIATRY & NEUROLOGY

## 2019-04-16 PROCEDURE — 3017F COLORECTAL CA SCREEN DOC REV: CPT | Performed by: PSYCHIATRY & NEUROLOGY

## 2019-04-16 PROCEDURE — 1123F ACP DISCUSS/DSCN MKR DOCD: CPT | Performed by: PSYCHIATRY & NEUROLOGY

## 2019-04-16 PROCEDURE — 1036F TOBACCO NON-USER: CPT | Performed by: PSYCHIATRY & NEUROLOGY

## 2019-04-16 PROCEDURE — G8399 PT W/DXA RESULTS DOCUMENT: HCPCS | Performed by: PSYCHIATRY & NEUROLOGY

## 2019-04-16 PROCEDURE — G8427 DOCREV CUR MEDS BY ELIG CLIN: HCPCS | Performed by: PSYCHIATRY & NEUROLOGY

## 2019-04-16 PROCEDURE — 1090F PRES/ABSN URINE INCON ASSESS: CPT | Performed by: PSYCHIATRY & NEUROLOGY

## 2019-04-16 ASSESSMENT — SLEEP AND FATIGUE QUESTIONNAIRES
HOW LIKELY ARE YOU TO NOD OFF OR FALL ASLEEP WHILE SITTING INACTIVE IN A PUBLIC PLACE: 0
HOW LIKELY ARE YOU TO NOD OFF OR FALL ASLEEP WHILE LYING DOWN TO REST IN THE AFTERNOON WHEN CIRCUMSTANCES PERMIT: 3
HOW LIKELY ARE YOU TO NOD OFF OR FALL ASLEEP WHILE WATCHING TV: 1
HOW LIKELY ARE YOU TO NOD OFF OR FALL ASLEEP WHILE SITTING AND TALKING TO SOMEONE: 0
HOW LIKELY ARE YOU TO NOD OFF OR FALL ASLEEP IN A CAR, WHILE STOPPED FOR A FEW MINUTES IN TRAFFIC: 0
HOW LIKELY ARE YOU TO NOD OFF OR FALL ASLEEP WHEN YOU ARE A PASSENGER IN A CAR FOR AN HOUR WITHOUT A BREAK: 2
HOW LIKELY ARE YOU TO NOD OFF OR FALL ASLEEP WHILE SITTING QUIETLY AFTER LUNCH WITHOUT ALCOHOL: 1
ESS TOTAL SCORE: 7
HOW LIKELY ARE YOU TO NOD OFF OR FALL ASLEEP WHILE SITTING AND READING: 0

## 2019-04-16 ASSESSMENT — ENCOUNTER SYMPTOMS
CHOKING: 0
APNEA: 0

## 2019-04-16 NOTE — PATIENT INSTRUCTIONS

## 2019-04-16 NOTE — PROGRESS NOTES
MD DELON Jennings Board Certified in Sleep Medicine  Certified in [address] [place] [address] Certified in Neurology Lorena Viktoria 0239 [address] [place] [address],  Sherwin Arzate [phone]   [address] [place] [address], 1200 [facility]                      [address] [address] [phone]    Subjective:     Patient ID: Dash Mariano is a 74 y.o. female. Chief Complaint   Patient presents with    Sleep Apnea     1 year follow up - needs new mask headgear and tubes        HPI:        Dash Mariano is a 74 y.o. female was seen today as annual follow for severe obstructive sleep apnea with AHI - 67.3/hr with Low SaO2 - 83% and time below 90% of 18% a. Patient is using the PAP machine about 100% of the time, more than 4 hours a nightabout  70 %, in total average of 5:56 hours a night in last 30 days. Currently on PAP at 10 cm, the AHI is only 1.7 events per hour atthis pressure. Patient improved regarding daytime sleepiness and fatigue, wakes up refreshed in the morning. The Patient scored Total score: 7 on Granby Sleepiness Scale ( more than 10 is indicative of daytime sleepiness)   Patient has no problem with PAP pressure or mask, uses nasal pillow mask.   Wakes up in the morning with dry mouth, without the humidifier       DOT/CDL - N/A        Previous Report(s)Reviewed: historical medical records         Social History     Socioeconomic History    Marital status:      Spouse name: Chai Junior Number of children: 2    Years of education: Not on file    Highest education level: Not on file   Occupational History    Occupation:  at [address] [place] [address] resource strain: Not on file    Food insecurity:     Worry: Not on file     Inability: Not on file    Transportation needs: Medical: Not on file     Non-medical: Not on file   Tobacco Use    Smoking status: Never Smoker    Smokeless tobacco: Never Used   Substance and Sexual Activity    Alcohol use: Yes     Comment: rarely    Drug use: Not on file    Sexual activity: Not on file   Lifestyle    Physical activity:     Days per week: Not on file     Minutes per session: Not on file    Stress: Not on file   Relationships    Social connections:     Talks on phone: Not on file     Gets together: Not on file     Attends [employer] service: Not on file     Active member of club or organization: Not on file     Attends meetings of clubs or organizations: Not on file     Relationship status: Not on file    Intimate partner violence:     Fear of current or ex partner: Not on file     Emotionally abused: Not on file     Physically abused: Not on file     Forced sexual activity: Not on file   Other Topics Concern    Not on file   Social History Narrative     with prostate cancer, but doing well. History of stomach cancer 2001. Does water aerobics at [facility]        Caffeine: SODA - 1 can a day TEA - 0  COFFEE - 1 cup in the morning       Prior to Admission medications    Medication Sig Start Date End Date Taking?  Authorizing Provider   amLODIPine (NORVASC) 2.5 MG tablet TAKE 1 TABLET BY MOUTH EVERY DAY 2/4/19  Yes Mary Zhou MD   losartan (COZAAR) 50 MG tablet TAKE 1 TABLET BY MOUTH EVERY DAY 2/4/19  Yes Mary Zhou MD   simvastatin (ZOCOR) 20 MG tablet TAKE 1 TABLET BY MOUTH NIGHTLY 1/31/19  Yes Mary Zhou MD   escitalopram (LEXAPRO) 10 MG tablet TAKE 1 TABLET BY MOUTH DAILY 11/2/18  Yes Mary Zhou MD   buPROPion (WELLBUTRIN XL) 150 MG extended release tablet TAKE ONE TABLET BY MOUTH ONE TIME DAILY 10/3/18  Yes Mary Zhou MD   metFORMIN (GLUCOPHAGE-XR) 500 MG extended release tablet TAKE 1 TABLET DAILY: END OF BREAKFAST OR AT BEDTIME. 4/13/18  Yes Mary Zhou MD   diphenhydrAMINE (BENADRYL) 25 MG capsule Take 25 mg by mouth nightly as needed for Itching   Yes Historical Provider, MD   omeprazole (PRILOSEC) 20 MG capsule Take 20 mg by mouth daily. Yes Historical Provider, MD   cetirizine (ZYRTEC) 10 MG tablet Take 10 mg by mouth daily. Yes Historical Provider, MD   gabapentin (NEURONTIN) 300 MG capsule TAKE ONE CAPSULE BY MOUTH THREE TIMES DAILY.  12/12/18 3/12/19  Fredy Dong MD       Allergies as of 04/16/2019 - Review Complete 04/16/2019   Allergen Reaction Noted    Pollen extract  04/16/2019       Patient Active Problem List   Diagnosis    Osteoarthritis    GERD (gastroesophageal reflux disease)    Mixed hyperlipidemia    TAZ on CPAP    Impaired glucose tolerance    Hypertension    Generalized anxiety disorder    Lipoma of skin    Myalgia    Chronic kidney disease (CKD), stage III (moderate) (HCC)    Periodic limb movement disorder    Obesity, Class II, BMI 35-39.9    BREWSTER (dyspnea on exertion)    Lipoma    Ganglion of wrist    Orthostatic dizziness       Past Medical History:   Diagnosis Date    Abnormal EKG     RBBB < 2008    Abnormal glucose tolerance test     Cataract     Chronic kidney disease (CKD), stage III (moderate) (HCC)     Elbow fracture 6/5/2008    Generalized anxiety disorder     GERD (gastroesophageal reflux disease)     Herpes zoster 8/2015    Hypertension     Lipoma of skin     Mixed hyperlipidemia     Myalgia and myositis     Obstructive sleep apnea     Osteoarthritis     Periodic limb movement disorder     Psoriasis     Seasonal allergic rhinitis     pollens    Tubular adenoma of colon 9/13       Past Surgical History:   Procedure Laterality Date    CATARACT REMOVAL  2011    Lenny Funez MD    CHOLECYSTECTOMY, LAPAROSCOPIC  6/11/15    Dr. Sung Joseph  6/08    ORIF right;  both fractured    SOLIS AND BSO         Family History   Problem Relation Age of Onset    Hypertension Mother     COPD Mother gangrene    Cancer Mother         bladder    Colon Cancer Father     Hypertension Son    [facility] Elevated Lipids Son     Elevated Lipids Son     Thyroid Disease Maternal Aunt        Review of Systems   Constitutional: Negative for fatigue. Respiratory: Negative for apnea and choking. Cardiovascular: Negative for leg swelling. Genitourinary: Negative for frequency. Neurological: Negative for headaches. Objective:     Vitals:  Weight BMI Neck circumference    Wt Readings from Last 3 Encounters:   04/16/19 235 lb (106.6 kg)   04/03/19 238 lb 9.6 oz (108.2 kg)   10/03/18 227 lb (103 kg)    Body mass index is 39.11 kg/m². BP HR SaO2   BP Readings from Last 3 Encounters:   04/16/19 122/84   04/03/19 136/80   10/03/18 118/70    Pulse Readings from Last 3 Encounters:   04/16/19 74   04/03/19 64   10/03/18 70    SpO2 Readings from Last 3 Encounters:   04/16/19 96%   04/16/18 94%   12/07/17 97%      Themandibular molar Class :   [x]1 []2 []3      Mallampati I Airway Classification:   []1 []2 [x]3 []4      Physical Exam   Constitutional: No distress. HENT:   Nose: Nose normal.   Eyes: EOM are normal.   Neck: Neck supple. Cardiovascular: Normal rate, regular rhythm and normal heart sounds. Pulmonary/Chest: Effort normal and breath sounds normal. No respiratory distress. Musculoskeletal: Normal range of motion. She exhibits no edema. Neurological: She is alert. Skin: Skin is warm. Psychiatric: She has a normal mood and affect. Nursing note and vitals reviewed. Assessment:   Severe Obstructive Sleep Apnea/Hypopnea Syndrome under good control on PAP at 10 cmwp. Diagnosis Orders   1. TAZ on CPAP     2. Dependence on other enabling machines and devices       Plan: Will continue the PAP at 10 cmwp, I encouraged the patient to use the PAP on nightly basis. I educated the Patient about the PAP machine including how to change the heated humidifier.     I will order PAP supplies, mask, filters. ... No orders of the defined types were placed in this encounter. Return in about 1 year (around 4/16/2020) for Reveiwing CPAP usage and compliance report and tro.     Jorge A Cerna MD  Medical Director - [facility]

## 2019-04-28 DIAGNOSIS — I10 ESSENTIAL HYPERTENSION: ICD-10-CM

## 2019-04-29 RX ORDER — LOSARTAN POTASSIUM 50 MG/1
TABLET ORAL
Qty: 30 TABLET | Refills: 0 | Status: SHIPPED | OUTPATIENT
Start: 2019-04-29 | End: 2019-06-23 | Stop reason: SDUPTHER

## 2019-04-29 RX ORDER — AMLODIPINE BESYLATE 2.5 MG/1
TABLET ORAL
Qty: 30 TABLET | Refills: 0 | Status: SHIPPED | OUTPATIENT
Start: 2019-04-29 | End: 2019-06-23 | Stop reason: SDUPTHER

## 2019-04-30 ENCOUNTER — APPOINTMENT (OUTPATIENT)
Dept: GENERAL RADIOLOGY | Age: 74
DRG: 244 | End: 2019-04-30
Payer: MEDICARE

## 2019-04-30 ENCOUNTER — HOSPITAL ENCOUNTER (INPATIENT)
Age: 74
LOS: 1 days | Discharge: HOME | DRG: 244 | End: 2019-05-01
Attending: EMERGENCY MEDICINE | Admitting: INTERNAL MEDICINE
Payer: MEDICARE

## 2019-04-30 ENCOUNTER — OFFICE VISIT (OUTPATIENT)
Dept: INTERNAL MEDICINE CLINIC | Age: 74
End: 2019-04-30
Payer: MEDICARE

## 2019-04-30 VITALS
HEART RATE: 36 BPM | BODY MASS INDEX: 39.32 KG/M2 | DIASTOLIC BLOOD PRESSURE: 55 MMHG | SYSTOLIC BLOOD PRESSURE: 147 MMHG | WEIGHT: 236 LBS | HEIGHT: 65 IN

## 2019-04-30 DIAGNOSIS — I44.2 THIRD DEGREE HEART BLOCK (HCC): Primary | ICD-10-CM

## 2019-04-30 DIAGNOSIS — R00.1 BRADYCARDIA: ICD-10-CM

## 2019-04-30 DIAGNOSIS — Z95.0 PACEMAKER: Primary | ICD-10-CM

## 2019-04-30 DIAGNOSIS — R42 DIZZINESS: Primary | ICD-10-CM

## 2019-04-30 PROBLEM — E79.0 HYPERURICEMIA: Status: ACTIVE | Noted: 2019-04-30

## 2019-04-30 PROBLEM — I45.9 HEART BLOCK: Status: ACTIVE | Noted: 2019-04-30

## 2019-04-30 LAB
A/G RATIO: 1.2 (ref 1.1–2.2)
ALBUMIN SERPL-MCNC: 3.7 G/DL (ref 3.4–5)
ALP BLD-CCNC: 70 U/L (ref 40–129)
ALT SERPL-CCNC: 16 U/L (ref 10–40)
ANION GAP SERPL CALCULATED.3IONS-SCNC: 14 MMOL/L (ref 3–16)
AST SERPL-CCNC: 12 U/L (ref 15–37)
BASOPHILS ABSOLUTE: 0 K/UL (ref 0–0.2)
BASOPHILS RELATIVE PERCENT: 0.2 %
BILIRUB SERPL-MCNC: 0.5 MG/DL (ref 0–1)
BUN BLDV-MCNC: 18 MG/DL (ref 7–20)
CALCIUM SERPL-MCNC: 8.9 MG/DL (ref 8.3–10.6)
CHLORIDE BLD-SCNC: 100 MMOL/L (ref 99–110)
CO2: 23 MMOL/L (ref 21–32)
CREAT SERPL-MCNC: 1.3 MG/DL (ref 0.6–1.2)
EOSINOPHILS ABSOLUTE: 0.2 K/UL (ref 0–0.6)
EOSINOPHILS RELATIVE PERCENT: 1.6 %
GFR AFRICAN AMERICAN: 48
GFR NON-AFRICAN AMERICAN: 40
GLOBULIN: 3.2 G/DL
GLUCOSE BLD-MCNC: 104 MG/DL (ref 70–99)
GLUCOSE BLD-MCNC: 106 MG/DL (ref 70–99)
GLUCOSE BLD-MCNC: 142 MG/DL (ref 70–99)
HCT VFR BLD CALC: 44.4 % (ref 36–48)
HEMOGLOBIN: 14.9 G/DL (ref 12–16)
LV EF: 60 %
LVEF MODALITY: NORMAL
LYMPHOCYTES ABSOLUTE: 1.9 K/UL (ref 1–5.1)
LYMPHOCYTES RELATIVE PERCENT: 20.3 %
MCH RBC QN AUTO: 30.1 PG (ref 26–34)
MCHC RBC AUTO-ENTMCNC: 33.5 G/DL (ref 31–36)
MCV RBC AUTO: 89.7 FL (ref 80–100)
MONOCYTES ABSOLUTE: 0.9 K/UL (ref 0–1.3)
MONOCYTES RELATIVE PERCENT: 10 %
NEUTROPHILS ABSOLUTE: 6.4 K/UL (ref 1.7–7.7)
NEUTROPHILS RELATIVE PERCENT: 67.9 %
PDW BLD-RTO: 13.8 % (ref 12.4–15.4)
PERFORMED ON: ABNORMAL
PERFORMED ON: ABNORMAL
PLATELET # BLD: 271 K/UL (ref 135–450)
PMV BLD AUTO: 7.8 FL (ref 5–10.5)
POTASSIUM REFLEX MAGNESIUM: 4.4 MMOL/L (ref 3.5–5.1)
PRO-BNP: 3432 PG/ML (ref 0–449)
RBC # BLD: 4.95 M/UL (ref 4–5.2)
SODIUM BLD-SCNC: 137 MMOL/L (ref 136–145)
TOTAL PROTEIN: 6.9 G/DL (ref 6.4–8.2)
TROPONIN: <0.01 NG/ML
TSH REFLEX: 2.07 UIU/ML (ref 0.27–4.2)
WBC # BLD: 9.4 K/UL (ref 4–11)

## 2019-04-30 PROCEDURE — G8399 PT W/DXA RESULTS DOCUMENT: HCPCS | Performed by: FAMILY MEDICINE

## 2019-04-30 PROCEDURE — 6360000002 HC RX W HCPCS

## 2019-04-30 PROCEDURE — 2580000003 HC RX 258

## 2019-04-30 PROCEDURE — 71045 X-RAY EXAM CHEST 1 VIEW: CPT

## 2019-04-30 PROCEDURE — 33208 INSRT HEART PM ATRIAL & VENT: CPT | Performed by: FAMILY MEDICINE

## 2019-04-30 PROCEDURE — 0JH606Z INSERTION OF PACEMAKER, DUAL CHAMBER INTO CHEST SUBCUTANEOUS TISSUE AND FASCIA, OPEN APPROACH: ICD-10-PCS | Performed by: INTERNAL MEDICINE

## 2019-04-30 PROCEDURE — 99213 OFFICE O/P EST LOW 20 MIN: CPT | Performed by: FAMILY MEDICINE

## 2019-04-30 PROCEDURE — 1036F TOBACCO NON-USER: CPT | Performed by: FAMILY MEDICINE

## 2019-04-30 PROCEDURE — 99153 MOD SED SAME PHYS/QHP EA: CPT | Performed by: FAMILY MEDICINE

## 2019-04-30 PROCEDURE — 93005 ELECTROCARDIOGRAM TRACING: CPT | Performed by: INTERNAL MEDICINE

## 2019-04-30 PROCEDURE — 84484 ASSAY OF TROPONIN QUANT: CPT

## 2019-04-30 PROCEDURE — 02HL3JZ INSERTION OF PACEMAKER LEAD INTO LEFT VENTRICLE, PERCUTANEOUS APPROACH: ICD-10-PCS | Performed by: INTERNAL MEDICINE

## 2019-04-30 PROCEDURE — 93000 ELECTROCARDIOGRAM COMPLETE: CPT | Performed by: FAMILY MEDICINE

## 2019-04-30 PROCEDURE — C1898 LEAD, PMKR, OTHER THAN TRANS: HCPCS

## 2019-04-30 PROCEDURE — 93005 ELECTROCARDIOGRAM TRACING: CPT | Performed by: EMERGENCY MEDICINE

## 2019-04-30 PROCEDURE — 1090F PRES/ABSN URINE INCON ASSESS: CPT | Performed by: FAMILY MEDICINE

## 2019-04-30 PROCEDURE — 94664 DEMO&/EVAL PT USE INHALER: CPT

## 2019-04-30 PROCEDURE — G8427 DOCREV CUR MEDS BY ELIG CLIN: HCPCS | Performed by: FAMILY MEDICINE

## 2019-04-30 PROCEDURE — C1894 INTRO/SHEATH, NON-LASER: HCPCS

## 2019-04-30 PROCEDURE — 99285 EMERGENCY DEPT VISIT HI MDM: CPT

## 2019-04-30 PROCEDURE — 99152 MOD SED SAME PHYS/QHP 5/>YRS: CPT | Performed by: FAMILY MEDICINE

## 2019-04-30 PROCEDURE — C1785 PMKR, DUAL, RATE-RESP: HCPCS

## 2019-04-30 PROCEDURE — 83880 ASSAY OF NATRIURETIC PEPTIDE: CPT

## 2019-04-30 PROCEDURE — 4040F PNEUMOC VAC/ADMIN/RCVD: CPT | Performed by: FAMILY MEDICINE

## 2019-04-30 PROCEDURE — G8417 CALC BMI ABV UP PARAM F/U: HCPCS | Performed by: FAMILY MEDICINE

## 2019-04-30 PROCEDURE — 1123F ACP DISCUSS/DSCN MKR DOCD: CPT | Performed by: FAMILY MEDICINE

## 2019-04-30 PROCEDURE — 3017F COLORECTAL CA SCREEN DOC REV: CPT | Performed by: FAMILY MEDICINE

## 2019-04-30 PROCEDURE — 6370000000 HC RX 637 (ALT 250 FOR IP): Performed by: INTERNAL MEDICINE

## 2019-04-30 PROCEDURE — 93306 TTE W/DOPPLER COMPLETE: CPT

## 2019-04-30 PROCEDURE — 2060000000 HC ICU INTERMEDIATE R&B

## 2019-04-30 PROCEDURE — 2500000003 HC RX 250 WO HCPCS

## 2019-04-30 PROCEDURE — 80053 COMPREHEN METABOLIC PANEL: CPT

## 2019-04-30 PROCEDURE — 84443 ASSAY THYROID STIM HORMONE: CPT

## 2019-04-30 PROCEDURE — 2580000003 HC RX 258: Performed by: INTERNAL MEDICINE

## 2019-04-30 PROCEDURE — 85025 COMPLETE CBC W/AUTO DIFF WBC: CPT

## 2019-04-30 PROCEDURE — 33208 INSRT HEART PM ATRIAL & VENT: CPT | Performed by: INTERNAL MEDICINE

## 2019-04-30 PROCEDURE — 02H63JZ INSERTION OF PACEMAKER LEAD INTO RIGHT ATRIUM, PERCUTANEOUS APPROACH: ICD-10-PCS | Performed by: INTERNAL MEDICINE

## 2019-04-30 RX ORDER — SODIUM CHLORIDE 0.9 % (FLUSH) 0.9 %
10 SYRINGE (ML) INJECTION EVERY 12 HOURS SCHEDULED
Status: DISCONTINUED | OUTPATIENT
Start: 2019-04-30 | End: 2019-05-01 | Stop reason: HOSPADM

## 2019-04-30 RX ORDER — ASPIRIN 325 MG
325 TABLET ORAL DAILY
COMMUNITY
End: 2019-08-07

## 2019-04-30 RX ORDER — ACETAMINOPHEN 325 MG/1
650 TABLET ORAL EVERY 4 HOURS PRN
Status: DISCONTINUED | OUTPATIENT
Start: 2019-04-30 | End: 2019-05-01 | Stop reason: HOSPADM

## 2019-04-30 RX ORDER — ONDANSETRON 2 MG/ML
4 INJECTION INTRAMUSCULAR; INTRAVENOUS EVERY 6 HOURS PRN
Status: DISCONTINUED | OUTPATIENT
Start: 2019-04-30 | End: 2019-05-01 | Stop reason: HOSPADM

## 2019-04-30 RX ORDER — SODIUM CHLORIDE 0.9 % (FLUSH) 0.9 %
10 SYRINGE (ML) INJECTION PRN
Status: DISCONTINUED | OUTPATIENT
Start: 2019-04-30 | End: 2019-05-01 | Stop reason: HOSPADM

## 2019-04-30 RX ADMIN — Medication 10 ML: at 21:18

## 2019-04-30 RX ADMIN — ACETAMINOPHEN 650 MG: 325 TABLET ORAL at 23:32

## 2019-04-30 ASSESSMENT — PAIN SCALES - GENERAL
PAINLEVEL_OUTOF10: 2
PAINLEVEL_OUTOF10: 0
PAINLEVEL_OUTOF10: 4
PAINLEVEL_OUTOF10: 0
PAINLEVEL_OUTOF10: 0

## 2019-04-30 ASSESSMENT — PAIN - FUNCTIONAL ASSESSMENT
PAIN_FUNCTIONAL_ASSESSMENT: ACTIVITIES ARE NOT PREVENTED
PAIN_FUNCTIONAL_ASSESSMENT: ACTIVITIES ARE NOT PREVENTED

## 2019-04-30 ASSESSMENT — ENCOUNTER SYMPTOMS
COUGH: 0
SHORTNESS OF BREATH: 0
WHEEZING: 0
CHEST TIGHTNESS: 0

## 2019-04-30 ASSESSMENT — PAIN DESCRIPTION - LOCATION
LOCATION: SHOULDER
LOCATION: SHOULDER

## 2019-04-30 ASSESSMENT — PAIN DESCRIPTION - ONSET
ONSET: ON-GOING
ONSET: ON-GOING

## 2019-04-30 ASSESSMENT — PAIN DESCRIPTION - PAIN TYPE
TYPE: ACUTE PAIN
TYPE: ACUTE PAIN;SURGICAL PAIN

## 2019-04-30 ASSESSMENT — PAIN DESCRIPTION - ORIENTATION
ORIENTATION: LEFT
ORIENTATION: LEFT

## 2019-04-30 ASSESSMENT — PAIN DESCRIPTION - FREQUENCY
FREQUENCY: INTERMITTENT
FREQUENCY: CONTINUOUS

## 2019-04-30 ASSESSMENT — PAIN DESCRIPTION - DESCRIPTORS
DESCRIPTORS: DISCOMFORT
DESCRIPTORS: PRESSURE;DISCOMFORT

## 2019-04-30 ASSESSMENT — PAIN DESCRIPTION - PROGRESSION
CLINICAL_PROGRESSION: GRADUALLY WORSENING
CLINICAL_PROGRESSION: GRADUALLY IMPROVING

## 2019-04-30 NOTE — ED PROVIDER NOTES
Monika Maher MD    CHOLECYSTECTOMY, LAPAROSCOPIC  6/11/15    Dr. Zaman Falling  6/08    ORIF right;  both fractured    SOLIS AND BSO           CURRENT MEDICATIONS       Previous Medications    AMLODIPINE (NORVASC) 2.5 MG TABLET    TAKE 1 TABLET BY MOUTH EVERY DAY    BUPROPION (WELLBUTRIN XL) 150 MG EXTENDED RELEASE TABLET    TAKE ONE TABLET BY MOUTH ONE TIME DAILY    CETIRIZINE (ZYRTEC) 10 MG TABLET    Take 10 mg by mouth daily. DIPHENHYDRAMINE (BENADRYL) 25 MG CAPSULE    Take 25 mg by mouth nightly as needed for Itching    ESCITALOPRAM (LEXAPRO) 10 MG TABLET    TAKE 1 TABLET BY MOUTH DAILY    GABAPENTIN (NEURONTIN) 300 MG CAPSULE    TAKE ONE CAPSULE BY MOUTH THREE TIMES DAILY. LOSARTAN (COZAAR) 50 MG TABLET    TAKE 1 TABLET BY MOUTH EVERY DAY    METFORMIN (GLUCOPHAGE-XR) 500 MG EXTENDED RELEASE TABLET    TAKE 1 TABLET DAILY: END OF BREAKFAST OR AT BEDTIME. OMEPRAZOLE (PRILOSEC) 20 MG CAPSULE    Take 20 mg by mouth daily.       SIMVASTATIN (ZOCOR) 20 MG TABLET    TAKE 1 TABLET BY MOUTH NIGHTLY       ALLERGIES     Pollen extract    FAMILY HISTORY       Family History   Problem Relation Age of Onset    Hypertension Mother     COPD Mother         gangrene   Grande Cancer Mother         bladder    Colon Cancer Father     Hypertension Son     Elevated Lipids Son     Elevated Lipids Son     Thyroid Disease Maternal Aunt           SOCIAL HISTORY       Social History     Socioeconomic History    Marital status:      Spouse name: Joshua Bernal Number of children: 2    Years of education: None    Highest education level: None   Occupational History    Occupation:  at [address] [place] [address] resource strain: None    Food insecurity:     Worry: None     Inability: None    Transportation needs:     Medical: None     Non-medical: None   Tobacco Use    Smoking status: Never Smoker    Smokeless tobacco: Never Used   Substance and Sexual Activity    Alcohol use: motion, no tenderness, trachea midline, no stridor  Eyes: PERRLA, EOMI, conjunctiva normal  Respiratory: normal breath sounds, non labored breathing pattern  Cardiovascular: bradycardia  GI: bowel sounds normal, soft, nontender, nondistended, no pulsatile masses  : deferred  Musculoskeletal: intact distal pulses, no clubbing, cyanosis, or edema. Good range of motion  Back: no tenderness  Integument: warm, dry, no erythema, no rash, < 2 second cap refill  Neurologic: alert and oriented ×3, no focal deficits appreciated    DIAGNOSTIC RESULTS     EKG: Third degree AV block, rate 36, prolonged QRS and RSR pattern which was seen previously on EKG from 5/15/2015, no ST elevation or depression, normal T waves    All EKG's are interpreted by the Emergency Department Physician who either signs or Co-signs this chart in the absence of a cardiologist.      RADIOLOGY:   Interpretation per the Radiologist below, if available at the time of this note:    XR CHEST PORTABLE   Final Result   No acute cardiopulmonary disease.                LABS:  Labs Reviewed   COMPREHENSIVE METABOLIC PANEL W/ REFLEX TO MG FOR LOW K - Abnormal; Notable for the following components:       Result Value    Glucose 142 (*)     CREATININE 1.3 (*)     GFR Non- 40 (*)     GFR  48 (*)     AST 12 (*)     All other components within normal limits    Narrative:     Performed at:  [facility]  [address] [place], [place] [address]   Phone [phone]   BRAIN NATRIURETIC PEPTIDE - Abnormal; Notable for the following components:    Pro-BNP 3,432 (*)     All other components within normal limits    Narrative:     Performed at:  [facility]  [address] [place], [place] [address]   Phone [phone]   CBC WITH AUTO DIFFERENTIAL    Narrative:     Performed at:  [facility]  [address] [place], [place] [address] Phone [phone]   TROPONIN    Narrative:     Performed at:  [facility]  [address] [place], [place] [address]   Phone [phone]   TSH WITH REFLEX    Narrative:     Performed at:  [address] [place] [address]  [address] [place], [place] [address]   Phone [phone]       All other labs were within normal range or not returned as of this dictation. EMERGENCY DEPARTMENT COURSE and DIFFERENTIAL DIAGNOSIS/MDM:   Vitals:    Vitals:    04/30/19 1230 04/30/19 1240 04/30/19 1245 04/30/19 1250   BP: (!) 150/50   (!) 136/57   Pulse: 52 (!) 35 (!) 44 (!) 35   Resp: 21 15 16 15   Temp:       TempSrc:       SpO2: 93% 93% 93% 94%   Weight:       Height:             MDM  68-year-old female presents to the emergency room with complaint of lightheadedness with ambulation or standing up. Vital signs show bradycardia. Physical exam as above. Pacer pads were placed on patient. She denies any symptoms while she is laying in the room. Her pressure is stable. We will get lab work, TSH and discussed with cardiology. Patient appears to have a third degree AV block on EKG. Patient still resting comfortably without any symptoms. Her pressure is stable at 136/57. Heart rate still in the 30s. I discussed with cardiologist Dr. Tan Parry and Dr. Gloria Carr. Both request a stat echocardiogram.  I will order this. I discussed with Dr. Gloria Carr as well and the patient's last drink was at 10 AM so he will prepare to possibly place pacemaker at 4 PM today. Laboratory back and shows normal potassium. Troponin negative. BNP is elevated. Patient does have 1+ lower extremity edema. She does not have any crackles. Stat echo ordered and patient will remain on pacer pads. I discussed with hospitalist and will admit to PCU.     ED crit    CRITICAL CARE:  The high probability of sudden, clinically significant deterioration in the patient's condition required the highest level of my preparedness to intervene urgently. The services I provided to this patient were to treat and/or prevent clinically significant deterioration that could result in:cardiovascular collapse. Services included the following: chart data review, reviewing nursing notes and/or old charts, documentation time, consultant collaboration regarding findings and treatment options, medication orders and management, direct patient care, re-evaluations, vital sign assessments and ordering, interpreting and reviewing diagnostic studies/lab tests. Aggregate critical care time was 15 minutes, which includes only time during which I was engaged in work directly related to the patient's care, as described above, whether at the bedside or elsewhere in the Emergency Department. It did not include time spent performing other reported procedures or the services of residents, students, nurses or physician assistants. CONSULTS:  IP CONSULT TO CARDIOLOGY      FINAL IMPRESSION      1. Third degree heart block [facility])          DISPOSITION/PLAN   DISPOSITION Decision To Admit 04/30/2019 01:14:16 PM      PATIENT REFERRED TO:  No follow-up provider specified.     DISCHARGE MEDICATIONS:  New Prescriptions    No medications on file          (Please note that portions of this note were completed with a voice recognition program.  Efforts were made to edit the dictations but occasionally words are mis-transcribed.)    Jamey Swanson DO (electronically signed)  Attending Emergency Physician     Jamey Swanson DO  04/30/19 Wally Daley DO  04/30/19 [id number]

## 2019-04-30 NOTE — PROCEDURES
[facility]     Electrophysiology Procedure Note       Date of Procedure: 4/30/2019  Patient's Name: Karin Laguerre  YOB: 1945   Medical Record Number: [id number]  Procedure Performed by: Rome Baeza MD    Procedures performed:  · Insertion of MRI compatible right ventricular pacing lead under fluoroscopy. · Insertion of MRI compatible right atrial lead under fluoroscopy  · Insertion of a MRI compatible [dual/single] chamber Pacemaker. · Electronic analysis of lead and device. · Anesthesia: Local and Monitored Anesthesia Care  · Level of sedation plan: Moderate sedation (conscious sedation) with intravenous Midazolam 4 mg and Fentanyl 125 mcg   · Start time: 1614  · Stop time: 1735  · Mallampati airway assessment class: I  · ASA class: 1  · (there were no independent trained observers who had no other duties involved in this procedure)    Indication of the procedure:       Karin Laguerre is a 74 y.o. female who is being referred for pacemaker implantation due to non-reversible bradycardia secondary to sinus rhythm with complete heart block and ventricular escape rhythm with v-rate of 32 bpm with symptoms of fatigue, dyspnea with exertion. Details of procedure: The patient was brought to the electrophysiology laboratory in stable condition. The patient was in a fasting and non-sedated state. The risks, benefits and alternatives of the procedure were discussed with the patient. The risks including, but not limited to, the risks of vascular injury, bleeding, infection, device malfunction, lead dislodgement, radiation exposure, injury to cardiac and surrounding structures (including pneumothorax), stroke, myocardial infarction and death were discussed in detail. The patient opted to proceed with the device implantation. Written informed consent was signed and placed in the chart. Prophylactic antibiotic using Ancef 1mg IV was given.      The patient was prepped and draped in sterile fashion. A timeout protocol was completed to identify the patient and the procedure being performed. IV sedation was provided with IV Versed and IV Fentanyl. The patient was monitored continuously with ECG, pulse oximetry, blood pressure monitoring, and direct observation. An incision was made in the left upper pectoral area in a transverse line roughly 1 cm from the clavicle after administration of lidocaine/bupivicaine combination. Using electrocautery and blunt dissection, a pocket was created. Central venous access into the left axillary vein was obtained using the modified Seldinger technique. After central venous access was obtained, a sheath was placed in the axillary vein. A right ventricular lead was advanced into the mid-septal position under fluoroscopic guidance and using a series of curved and straight stylets. The lead was actively fixated. After confirming appropriate function and no diaphragmatic stimulation at maximum output, the sheath was split and removed. The lead was secured to the underlying tissue using suture material.      A new sheath was advanced over a second previously placed wire into the vein. The atrial lead was advanced to the right atrial appendage and actively fixated under fluoroscopic guidance. After confirming appropriate function and no diaphragmatic stimulation at maximum output, the sheath was split and removed. The lead was secured to the underlying tissue using suture. The leads were then connected to the new pulse generator which was then placed into the pocket. Copious amount (> 200 cc) of saline flush was used to irrigate the pocket. The pocket was then closed using a 2-0 Vicryl subcutaneous layer, a 3-0 subcutaneous layer, and a subcuticular layer using 4-0 Vicryl. The skin was covered with Steri-Strips and pressure dressing. All sponge and needle counts were reported as correct at the end of the procedure.      The patient tolerated the procedure

## 2019-04-30 NOTE — PROGRESS NOTES
4 Eyes Skin Assessment     The patient is being assess for  Admission    I agree that 2 RN's have performed a thorough Head to Toe Skin Assessment on the patient. ALL assessment sites listed below have been assessed. Areas assessed by both nurses: Emma/Debbie  [x]   Head, Face, and Ears   [x]   Shoulders, Back, and Chest  [x]   Arms, Elbows, and Hands   [x]   Coccyx, Sacrum, and IschIum  [x]   Legs, Feet, and Heels        Does the Patient have Skin Breakdown?   No         Rasheed Prevention initiated:  No   Wound Care Orders initiated:  No      [facility] nurse consulted for Pressure Injury (Stage 3,4, Unstageable, DTI, NWPT, and Complex wounds), New and Established Ostomies:  No      Nurse 1 eSignature: Electronically signed by Eloy Malone RN on 4/30/19 at 3:09 PM    **SHARE this note so that the co-signing nurse is able to place an eSignature**    Nurse 2 eSignature: Electronically signed by Cierra Brambila RN on 4/30/19 at 3:10 PM

## 2019-04-30 NOTE — PRE SEDATION
Sedation Pre-Procedure Note    Patient Name: Rosa M Crow   YOB: 1945  Room/Bed: O3O-1485/9705-12  Medical Record Number: [id number]  Date: 4/30/2019   Time: 3:46 PM       Indication:  Complete heart block    Consent: I have discussed with the patient and/or the patient representative the indication, alternatives, and the possible risks and/or complications of the planned procedure and the anesthesia methods. The patient and/or patient representative appear to understand and agree to proceed. Vital Signs:   Vitals:    04/30/19 1504   BP: 126/70   Pulse: (!) 39   Resp: 16   Temp:    SpO2:        Past Medical History:   has a past medical history of Abnormal EKG, Abnormal glucose tolerance test, Cataract, Chronic kidney disease (CKD), stage III (moderate) (HCC), Elbow fracture, Generalized anxiety disorder, GERD (gastroesophageal reflux disease), Herpes zoster, Hypertension, Lipoma of skin, Mixed hyperlipidemia, Myalgia and myositis, Obstructive sleep apnea, Osteoarthritis, Periodic limb movement disorder, Psoriasis, Seasonal allergic rhinitis, and Tubular adenoma of colon. Past Surgical History:   has a past surgical history that includes Cataract removal (2011); Elbow surgery (6/08); rubio and bso (cervix removed); and Cholecystectomy, laparoscopic (6/11/15). Medications:   Scheduled Meds:    sodium chloride flush  10 mL Intravenous 2 times per day    [START ON 5/1/2019] enoxaparin  40 mg Subcutaneous Daily    insulin lispro  0-12 Units Subcutaneous TID WC    insulin lispro  0-6 Units Subcutaneous Nightly     Continuous Infusions:   PRN Meds: sodium chloride flush, magnesium hydroxide, ondansetron  Home Meds:   Prior to Admission medications    Medication Sig Start Date End Date Taking?  Authorizing Provider   aspirin 325 MG tablet Take 325 mg by mouth daily   Yes Historical Provider, MD   losartan (COZAAR) 50 MG tablet TAKE 1 TABLET BY MOUTH EVERY DAY 4/29/19  Yes Jamir Figueroa MD amLODIPine (NORVASC) 2.5 MG tablet TAKE 1 TABLET BY MOUTH EVERY DAY 4/29/19  Yes Debbie Zhou MD   simvastatin (ZOCOR) 20 MG tablet TAKE 1 TABLET BY MOUTH NIGHTLY 1/31/19  Yes Debbie Zhou MD   gabapentin (NEURONTIN) 300 MG capsule TAKE ONE CAPSULE BY MOUTH THREE TIMES DAILY. 12/12/18 4/30/19 Yes Debbie Zhou MD   escitalopram (LEXAPRO) 10 MG tablet TAKE 1 TABLET BY MOUTH DAILY 11/2/18  Yes Debbie Zhou MD   buPROPion (WELLBUTRIN XL) 150 MG extended release tablet TAKE ONE TABLET BY MOUTH ONE TIME DAILY 10/3/18  Yes Debbie Zhou MD   metFORMIN (GLUCOPHAGE-XR) 500 MG extended release tablet TAKE 1 TABLET DAILY: END OF BREAKFAST OR AT BEDTIME. 4/13/18  Yes Debbie Zhou MD   diphenhydrAMINE (BENADRYL) 25 MG capsule Take 25 mg by mouth nightly as needed for Itching   Yes Historical Provider, MD   omeprazole (PRILOSEC) 20 MG capsule Take 20 mg by mouth daily. Yes Historical Provider, MD   cetirizine (ZYRTEC) 10 MG tablet Take 10 mg by mouth daily as needed    Yes Historical Provider, MD     Coumadin Use Last 7 Days:  no  Antiplatelet drug therapy use last 7 days: yes - ASA  Other anticoagulant use last 7 days: no  Additional Medication Information:  n/a      Pre-Sedation Documentation and Exam:   I have personally completed a history, physical exam & review of systems for this patient (see notes).     Mallampati Airway Assessment:  Mallampati Class I - (soft palate, fauces, uvula & anterior/posterior tonsillar pillars are visible)    Prior History of Anesthesia Complications:   none    ASA Classification:  Class 2 - A normal healthy patient with mild systemic disease    Sedation/ Anesthesia Plan:   intravenous sedation    Medications Planned:   midazolam (Versed) intravenously and fentanyl intravenously    Patient is an appropriate candidate for plan of sedation: yes    Electronically signed by Sherley Pallas, MD on 4/30/2019 at 3:46 PM

## 2019-04-30 NOTE — PATIENT INSTRUCTIONS
2 things for the aching when stable from the slow heart beat:   When you are home from the slow heart beat and feeling stable, get Vitamin D3 2000 units daily and also get some sunshine.   Call the office when you are ready to add in allopurinol to help your uric acid and this should help the aching

## 2019-04-30 NOTE — H&P
Hospital Medicine History & Physical      PCP: Desiree Azevedo MD    Date of Admission: 4/30/2019    Date of Service: Pt seen/examined on 4/30/2019    Chief Complaint:      Chief Complaint   Patient presents with    Dizziness     \"lightheaded\"    Bradycardia     sent by pcp for hr in 35s. History Of Present Illness: The patient is a 74 y.o. female with chronic kidney disease, GERD, hypertension and right bundle branch block who presents to [facility] with dizziness and lightheaded episode. Patient states since the last 2 weeks she has been having episodes of dizziness and lightheadedness when she stands with a sensation of palpitation and this got worse over the last one week. No shortness of breath no chest pain  In the PCPs office patient was found to be bradycardic with a heart rate of the 30s and was sent to the ER. She was found to be in complete heart block  Past Medical History:        Diagnosis Date    Abnormal EKG     RBBB < 2008    Abnormal glucose tolerance test     Cataract     Chronic kidney disease (CKD), stage III (moderate) (HCC)     Elbow fracture 6/5/2008    Generalized anxiety disorder     GERD (gastroesophageal reflux disease)     Herpes zoster 8/2015    Hypertension     Lipoma of skin     Mixed hyperlipidemia     Myalgia and myositis     Obstructive sleep apnea     Osteoarthritis     Periodic limb movement disorder     Psoriasis     Seasonal allergic rhinitis     pollens    Tubular adenoma of colon 9/13       Past Surgical History:        Procedure Laterality Date    CATARACT REMOVAL  2011    Alessia Benavides MD   [address], LAPAROSCOPIC  6/11/15    Dr. Shreyas Mills  6/08    ORIF right;  both fractured    SOLIS AND BSO         Medications Prior to Admission:    Prior to Admission medications    Medication Sig Start Date End Date Taking?  Authorizing Provider   losartan (COZAAR) 50 MG tablet TAKE 1 TABLET BY MOUTH EVERY DAY 4/29/19   Thuy GUARDADO Yareli Carty MD   amLODIPine (NORVASC) 2.5 MG tablet TAKE 1 TABLET BY MOUTH EVERY DAY 4/29/19   Desiree Azevedo MD   simvastatin (ZOCOR) 20 MG tablet TAKE 1 TABLET BY MOUTH NIGHTLY 1/31/19   Desiree Azevedo MD   gabapentin (NEURONTIN) 300 MG capsule TAKE ONE CAPSULE BY MOUTH THREE TIMES DAILY. 12/12/18 4/30/19  Desiree Azevedo MD   escitalopram (LEXAPRO) 10 MG tablet TAKE 1 TABLET BY MOUTH DAILY 11/2/18   Desiree Azevedo MD   buPROPion (WELLBUTRIN XL) 150 MG extended release tablet TAKE ONE TABLET BY MOUTH ONE TIME DAILY 10/3/18   Desiree Azevedo MD   metFORMIN (GLUCOPHAGE-XR) 500 MG extended release tablet TAKE 1 TABLET DAILY: END OF BREAKFAST OR AT BEDTIME. 4/13/18   Desiree Azevedo MD   diphenhydrAMINE (BENADRYL) 25 MG capsule Take 25 mg by mouth nightly as needed for Itching    Historical Provider, MD   omeprazole (PRILOSEC) 20 MG capsule Take 20 mg by mouth daily. Historical Provider, MD   cetirizine (ZYRTEC) 10 MG tablet Take 10 mg by mouth daily. Historical Provider, MD       Allergies:  Pollen extract    Social History:       reports that she has never smoked. She has never used smokeless tobacco. She reports that she drinks alcohol. Family History:  Reviewed in detail and negative for DM, Early CAD, Cancer, CVA. Positive as follows:        Problem Relation Age of Onset    Hypertension Mother     COPD Mother         gangrene    Cancer Mother         bladder    Colon Cancer Father     Hypertension Son     Elevated Lipids Son     Elevated Lipids Son     Thyroid Disease Maternal Aunt        REVIEW OF SYSTEMS:   Positive review  noted in the HPI. All other systems reviewed and negative.     PHYSICAL EXAM:    BP (!) 136/57   Pulse (!) 35   Temp 97.9 °F (36.6 °C) (Oral)   Resp 15   Ht 5' 5\" (1.651 m)   Wt 236 lb 12.4 oz (107.4 kg)   SpO2 94%   BMI 39.40 kg/m²   General Appearance: alert and oriented to person, place and time, well developed and well- nourished, in no BEART, A7DSRVTS, PHART, THGBART, GUU3LQP, PO2ART, WBH0JDO    UA:No results for input(s): NITRITE, COLORU, PHUR, LABCAST, WBCUA, RBCUA, MUCUS, TRICHOMONAS, YEAST, BACTERIA, CLARITYU, SPECGRAV, LEUKOCYTESUR, UROBILINOGEN, BILIRUBINUR, BLOODU, GLUCOSEU, KETUA, AMORPHOUS in the last 72 hours. Microbiology:  No results for input(s): LABGRAM, LABANAE, ORG, CXSURG in the last 72 hours. Nasal Culture: No results for input(s): ORG, MRSAPCR in the last 72 hours. Blood Culture: No results for input(s): BC, BLOODCULT2, ORG in the last 72 hours. Fungal Culture:   No results for input(s): FUNGSM in the last 72 hours. No results for input(s): FUNCXBLD in the last 72 hours. CSF Culture:  No results for input(s): COLORCSF, APPEARCSF, CFTUBE, CLOTCSF, WBCCSF, RBCCSF, NEUTCSF, NUMCELLSCSF, LYMPHSCSF, MONOCSF, GLUCCSF, VOLCSF in the last 72 hours. Respiratory Culture:  No results for input(s): Meron Gene in the last 72 hours. AFB:No results for input(s): AFBSMEAR in the last 72 hours. Urine Culture  No results for input(s): LABURIN in the last 72 hours. RADIOLOGY:    XR CHEST PORTABLE   Final Result   No acute cardiopulmonary disease. Previous medical records personally reviewed and analyzed         PHYSICIAN CERTIFICATION    I certify that Agustina Damian is expected to be hospitalized for > 2  midnights based on the following assessment and plan:    ASSESSMENT/PLAN:  Active Hospital Problems    Diagnosis Date Noted    Heart block [I45.9] 04/30/2019     Complete heart block  - patient is symptomatic but Bp is stable  - pacer pad on   -Ep consulted  - for possible pacemaker today     HTN  -hold off bp meds( she took her am meds)     Dm2  -SSI    Hyperlipidemia  - on statin     DVT Prophylaxis:lovenox  Diet: Diet NPO Effective Now  Code Status: No Order      Dispo - PCU        Manny Perales MD  The note was completed using EMR.  Every effort was made to ensure accuracy; however, inadvertent

## 2019-04-30 NOTE — PROGRESS NOTES
Component Value Date     04/03/2019    K 4.7 04/03/2019     04/03/2019    CO2 27 04/03/2019    BUN 15 04/03/2019    CREATININE 0.9 04/03/2019    GLUCOSE 114 (H) 04/03/2019    CALCIUM 9.0 04/03/2019    PROT 6.9 04/03/2019    LABALBU 4.3 04/03/2019    BILITOT 0.4 04/03/2019    ALKPHOS 70 04/03/2019    AST 12 (L) 04/03/2019    ALT 12 04/03/2019    LABGLOM >60 04/03/2019    GFRAA >60 04/03/2019    AGRATIO 1.7 04/03/2019    GLOB 2.6 04/03/2019     Lab Results   Component Value Date    WBC 6.7 04/03/2019    HGB 14.5 04/03/2019    HCT 43.1 04/03/2019    MCV 91.1 04/03/2019     04/03/2019       Lab Results   Component Value Date    LABA1C 6.2 10/03/2018     Lab Results   Component Value Date    LDLCALC 89 04/03/2019     Lab Results   Component Value Date    LABURIC 7.9 (H) 04/03/2019     CRP, ESR, arthritis labs, CK are all normal.    Assessment:      1. Dizziness  Most likely due to bradycardia. 2. Bradycardia  Looks like heart block from what I can tell. Computer overread EKG incorrectly. 1 PVC noted. - EKG 12 Lead    Refer directed to ED. I did discuss the patient with [facility] ER physician. She is stable for transport per private vehicle. Her  will take her directly to ED          Plan:      See orders. See after visit summary, patient instructions, and reference hand-outs. A PRINTED SUMMARY = AVS GIVEN TO THE PATIENT. Discussed use, benefit, and side effects of prescribed medications. Barriers to medication compliance addressed. All patient questions answered.           Ayush Ma MD

## 2019-04-30 NOTE — ED NOTES
Pt arrived to the ED via private car, pt states that she was at her dr office this am, and was there for routine visit, and her heart rate was decreased, pt denies pain, pt is alert and orient, Heart rate is between 30-50 on monitor, per MD request pacer pads placed on pt, BP (!) 172/63   Pulse (!) 37   Temp 97.9 °F (36.6 °C) (Oral)   Resp 16   Ht 5' 5\" (1.651 m)   Wt 236 lb 12.4 oz (107.4 kg)   SpO2 95%   BMI 39.40 kg/m²        Jose Sportsman, RN  04/30/19 [id number]

## 2019-04-30 NOTE — ED NOTES
Report called to Pranay MIDDLETON   On floor      5 west    Cardiac monitor on  VSS, Afebrile, skin warm dry and intact, normal saline locked   Family updated on transfer            Judy Rhoades RN  04/30/19 2998

## 2019-04-30 NOTE — PROGRESS NOTES
Patient here from ED via stretcher. Complete heart block on tele. VSS. Patient oriented to unit and room. Admit assessment complete and history obtained. Orders reviewed with patient and POC discussed. Call light in reach. Bed in lowest position, bed alarm on. Denies other needs. Will continue to monitor.

## 2019-05-01 VITALS
HEART RATE: 71 BPM | TEMPERATURE: 97.8 F | DIASTOLIC BLOOD PRESSURE: 79 MMHG | SYSTOLIC BLOOD PRESSURE: 149 MMHG | WEIGHT: 235.67 LBS | RESPIRATION RATE: 16 BRPM | HEIGHT: 65 IN | OXYGEN SATURATION: 96 % | BODY MASS INDEX: 39.27 KG/M2

## 2019-05-01 LAB
ANION GAP SERPL CALCULATED.3IONS-SCNC: 16 MMOL/L (ref 3–16)
BUN BLDV-MCNC: 17 MG/DL (ref 7–20)
CALCIUM SERPL-MCNC: 8.4 MG/DL (ref 8.3–10.6)
CHLORIDE BLD-SCNC: 102 MMOL/L (ref 99–110)
CO2: 22 MMOL/L (ref 21–32)
CREAT SERPL-MCNC: 1 MG/DL (ref 0.6–1.2)
EKG ATRIAL RATE: 42 BPM
EKG ATRIAL RATE: 72 BPM
EKG DIAGNOSIS: NORMAL
EKG DIAGNOSIS: NORMAL
EKG P AXIS: 37 DEGREES
EKG P AXIS: 59 DEGREES
EKG P-R INTERVAL: 210 MS
EKG Q-T INTERVAL: 542 MS
EKG Q-T INTERVAL: 668 MS
EKG QRS DURATION: 128 MS
EKG QRS DURATION: 160 MS
EKG QTC CALCULATION (BAZETT): 516 MS
EKG QTC CALCULATION (BAZETT): 593 MS
EKG R AXIS: -57 DEGREES
EKG R AXIS: 15 DEGREES
EKG T AXIS: 27 DEGREES
EKG T AXIS: 94 DEGREES
EKG VENTRICULAR RATE: 36 BPM
EKG VENTRICULAR RATE: 72 BPM
GFR AFRICAN AMERICAN: >60
GFR NON-AFRICAN AMERICAN: 54
GLUCOSE BLD-MCNC: 104 MG/DL (ref 70–99)
GLUCOSE BLD-MCNC: 117 MG/DL (ref 70–99)
HCT VFR BLD CALC: 42.8 % (ref 36–48)
HEMOGLOBIN: 14.2 G/DL (ref 12–16)
MCH RBC QN AUTO: 30.3 PG (ref 26–34)
MCHC RBC AUTO-ENTMCNC: 33.3 G/DL (ref 31–36)
MCV RBC AUTO: 91.2 FL (ref 80–100)
PDW BLD-RTO: 13.7 % (ref 12.4–15.4)
PERFORMED ON: ABNORMAL
PLATELET # BLD: 210 K/UL (ref 135–450)
PMV BLD AUTO: 7.6 FL (ref 5–10.5)
POTASSIUM REFLEX MAGNESIUM: 4 MMOL/L (ref 3.5–5.1)
RBC # BLD: 4.69 M/UL (ref 4–5.2)
SODIUM BLD-SCNC: 140 MMOL/L (ref 136–145)
WBC # BLD: 7.5 K/UL (ref 4–11)

## 2019-05-01 PROCEDURE — 36415 COLL VENOUS BLD VENIPUNCTURE: CPT

## 2019-05-01 PROCEDURE — 2580000003 HC RX 258: Performed by: INTERNAL MEDICINE

## 2019-05-01 PROCEDURE — 80048 BASIC METABOLIC PNL TOTAL CA: CPT

## 2019-05-01 PROCEDURE — 6370000000 HC RX 637 (ALT 250 FOR IP): Performed by: INTERNAL MEDICINE

## 2019-05-01 PROCEDURE — 6360000002 HC RX W HCPCS: Performed by: INTERNAL MEDICINE

## 2019-05-01 PROCEDURE — 94760 N-INVAS EAR/PLS OXIMETRY 1: CPT

## 2019-05-01 PROCEDURE — 93010 ELECTROCARDIOGRAM REPORT: CPT | Performed by: INTERNAL MEDICINE

## 2019-05-01 PROCEDURE — 99024 POSTOP FOLLOW-UP VISIT: CPT | Performed by: INTERNAL MEDICINE

## 2019-05-01 PROCEDURE — 85027 COMPLETE CBC AUTOMATED: CPT

## 2019-05-01 RX ADMIN — ACETAMINOPHEN 650 MG: 325 TABLET ORAL at 09:51

## 2019-05-01 RX ADMIN — Medication 10 ML: at 09:56

## 2019-05-01 RX ADMIN — Medication 2 G: at 09:51

## 2019-05-01 RX ADMIN — Medication 2 G: at 00:42

## 2019-05-01 RX ADMIN — ACETAMINOPHEN 650 MG: 325 TABLET ORAL at 05:20

## 2019-05-01 ASSESSMENT — PAIN SCALES - GENERAL
PAINLEVEL_OUTOF10: 0
PAINLEVEL_OUTOF10: 0
PAINLEVEL_OUTOF10: 3
PAINLEVEL_OUTOF10: 0
PAINLEVEL_OUTOF10: 4

## 2019-05-01 ASSESSMENT — PAIN DESCRIPTION - FREQUENCY: FREQUENCY: INTERMITTENT

## 2019-05-01 ASSESSMENT — PAIN - FUNCTIONAL ASSESSMENT: PAIN_FUNCTIONAL_ASSESSMENT: ACTIVITIES ARE NOT PREVENTED

## 2019-05-01 ASSESSMENT — PAIN DESCRIPTION - PAIN TYPE: TYPE: ACUTE PAIN

## 2019-05-01 ASSESSMENT — PAIN DESCRIPTION - DESCRIPTORS: DESCRIPTORS: DISCOMFORT

## 2019-05-01 ASSESSMENT — PAIN DESCRIPTION - ORIENTATION: ORIENTATION: LEFT

## 2019-05-01 ASSESSMENT — PAIN DESCRIPTION - ONSET: ONSET: ON-GOING

## 2019-05-01 ASSESSMENT — PAIN DESCRIPTION - LOCATION: LOCATION: SHOULDER

## 2019-05-01 ASSESSMENT — PAIN DESCRIPTION - PROGRESSION: CLINICAL_PROGRESSION: GRADUALLY IMPROVING

## 2019-05-01 NOTE — DISCHARGE SUMMARY
Hospital Medicine Discharge Summary      Patient ID: Dana Orn      Patient's PCP: Fani Herrmann MD    Admit Date: 4/30/2019     Discharge Date: 5/1/2019  The patient was seen and examined on day of discharge and this discharge summary is in conjunction with any daily progress note from day of discharge. Admitting Physician: Tan Valentine MD    Discharge Physician: Tan Valentine MD     Admitted for   Chief Complaint   Patient presents with    Dizziness     \"lightheaded\"    Bradycardia     sent by pcp for hr in 35s. Admitting Diagnosis Heart block [I45.9]    Discharge Diagnoses: Active Hospital Problems    Diagnosis Date Noted    Heart block [I45.9] 04/30/2019    Chronic kidney disease (CKD), stage III (moderate) (HCC) [N18.3]     Mixed hyperlipidemia [E78.2]     Hypertension [I10]        Follow Up: Primary Care Physician in one week    PCP to Follow up on   Needs FU with Dr Yael Tang in 1 week           Hospital Course: The patient is a 74 y.o. female with chronic kidney disease, GERD, hypertension and right bundle branch block who presents to [facility] with dizziness and lightheaded episode. Patient states since the last 2 weeks she has been having episodes of dizziness and lightheadedness when she stands with a sensation of palpitation and this got worse over the last one week. the PCPs office patient was found to be bradycardic with a heart rate of the 30s and was sent to the ER. She was found to be in complete heart block. Electrophysiology was consulted. Patient was taken to the cardiac catheterization lab and had a MRI compatible pacemaker placed. She will need follow-up with cardiology in one week.               Consults:     IP CONSULT TO CARDIOLOGY  IP CONSULT TO CARDIOLOGY        Disposition: home    Discharged Condition: Stable    Code Status: Full Code    Activity: activity as tolerated    Diet: regular diet      Wound Care: as directed    SUBJECTIVE / Interval History:   feels ok. Exam:  TEMPERATURE:  Current - Temp: 97.4 °F (36.3 °C); Max - Temp  Av °F (36.7 °C)  Min: 97.4 °F (36.3 °C)  Max: 98.5 °F (36.9 °C)  RESPIRATIONS RANGE: Resp  Av.1  Min: 11  Max: 21  PULSE RANGE: Pulse  Av.2  Min: 34  Max: 89  BLOOD PRESSURE RANGE:  Systolic (84CET), VFM:954 , Min:81 , KOC:410   ; Diastolic (35QNY), JSD:45, Min:50, Max:97    PULSE OXIMETRY RANGE: SpO2  Av.2 %  Min: 90 %  Max: 96 %  24HR INTAKE/OUTPUT:      Intake/Output Summary (Last 24 hours) at 2019 0912  Last data filed at 2019 9519  Gross per 24 hour   Intake 710 ml   Output 825 ml   Net -115 ml      Wt Readings from Last 1 Encounters:   19 235 lb 10.8 oz (106.9 kg)     BMI Readings from Last 1 Encounters:   19 39.22 kg/m²     General appearance: No apparent distress, appears stated age and cooperative. HEENT Normal cephalic, atraumatic without obvious deformity. Pupils equal, round, and reactive to light. Extra ocular muscles intact. Conjunctivae/corneas clear. Neck: Supple, No jugular venous distention/bruits. Trachea midline without thyromegaly or adenopathy with full range of motion. Lungs: Clear to ascultation, bilaterally without Rales/Wheezes/Rhonchi with good respiratory effort. Heart: Regular rate and rhythm with Normal S1/S2 without  murmurs, rubs or gallops, point of maximum impulse non-displaced  Abdomen: Soft, non-tender or non-distended without rigidity or guarding and positive bowel sounds all four quadrants. Extremities: No clubbing, cyanosis, or edema bilaterally. Full range of motion without deformity and normal gait intact. Skin: Pacer site dressing +   Neurologic: Alert and oriented X 3,  neurovascularly intact with sensory/motor intact upper extremities/lower extremities, bilaterally. Cranial nerves:II-XII intact, grossly non-focal.  Mental status: Alert, oriented, thought content appropriate      Labs:  For convenience and continuity at follow-up the following most recent labs are provided:    CBC:   Lab Results   Component Value Date    WBC 7.5 05/01/2019    HGB 14.2 05/01/2019    HCT 42.8 05/01/2019     05/01/2019       RENAL:   Lab Results   Component Value Date     05/01/2019    K 4.0 05/01/2019     05/01/2019    CO2 22 05/01/2019    BUN 17 05/01/2019    CREATININE 1.0 05/01/2019           Discharge Medications:    Swazi Chough \"Leandro\"   Home Medication Instructions TPU:[id number]    Printed on:05/01/19 0912   Medication Information                      amLODIPine (NORVASC) 2.5 MG tablet  TAKE 1 TABLET BY MOUTH EVERY DAY             aspirin 325 MG tablet  Take 325 mg by mouth daily             buPROPion (WELLBUTRIN XL) 150 MG extended release tablet  TAKE ONE TABLET BY MOUTH ONE TIME DAILY             cetirizine (ZYRTEC) 10 MG tablet  Take 10 mg by mouth daily as needed              diphenhydrAMINE (BENADRYL) 25 MG capsule  Take 25 mg by mouth nightly as needed for Itching             escitalopram (LEXAPRO) 10 MG tablet  TAKE 1 TABLET BY MOUTH DAILY             gabapentin (NEURONTIN) 300 MG capsule  TAKE ONE CAPSULE BY MOUTH THREE TIMES DAILY. losartan (COZAAR) 50 MG tablet  TAKE 1 TABLET BY MOUTH EVERY DAY             metFORMIN (GLUCOPHAGE-XR) 500 MG extended release tablet  TAKE 1 TABLET DAILY: END OF BREAKFAST OR AT BEDTIME. omeprazole (PRILOSEC) 20 MG capsule  Take 20 mg by mouth daily. simvastatin (ZOCOR) 20 MG tablet  TAKE 1 TABLET BY MOUTH NIGHTLY                 Future Appointments   Date Time Provider Troy Fitzgerald   5/8/2019 10:15 AM SCHEDULE, [facility] PACERS [facility]   5/8/2019 10:30 AM SCHEDULE, [facility] CARDIO [facility]   11/19/2019  7:30 AM SCHEDULE, [facility] PHONE TRANSMISSION [facility]       Time Spent on discharge is more than 45 minutes in the examination, evaluation, counseling and review of medications and discharge plan.       Signed:  Aline Weaver

## 2019-05-01 NOTE — PLAN OF CARE
Problem: Falls - Risk of:  Goal: Absence of physical injury  Description  Absence of physical injury  Outcome: Ongoing  Note:   Fall risk assessment completed every shift. All precautions in place. Pt has call light within reach at all times. Room clear of clutter. Pt aware to call for assistance when getting up. Problem: Pain:  Goal: Control of chronic pain  Description  Control of chronic pain  Outcome: Ongoing  Note:   Pain/discomfort being managed with PRN analgesics per MD orders. Pt able to express presence and absence of pain and rate pain appropriately using numerical scale.

## 2019-05-01 NOTE — PROGRESS NOTES
[address]   Cardiac Electrophysiology Progress Note     Admit Date: 2019     Reason for follow up: s/p Medtronic 2-chamber PPM implantation yesterday. HPI and Interval History:   Patient seen and examined. Clinical notes reviewed. Telemetry reviewed. No new complaint today. No major events overnight. Review of System:  All other systems reviewed except for that noted above. Pertinent negatives and positives are:     · General: negative for fever, chills   · Ophthalmic ROS: negative for - eye pain or loss of vision  · ENT ROS: negative for - headaches, sore throat   · Respiratory: negative for - cough, sputum  · Cardiovascular: Reviewed in HPI  · Gastrointestinal: negative for - abdominal pain, diarrhea, N/V  · Hematology: negative for - bleeding, blood clots, bruising or jaundice  · Genito-Urinary:  negative for - Dysuria or incontinence  · Musculoskeletal: negative for - Joint swelling, muscle pain  · Neurological: negative for - confusion, dizziness, headaches   · Psychiatric: No anxiety, no depression. · Dermatological: negative for - rash      Physical Examination:  Vitals:    19 0812   BP: 135/65   Pulse: 67   Resp: 16   Temp: 97.4 °F (36.3 °C)   SpO2: 95%        Intake/Output Summary (Last 24 hours) at 2019 0927  Last data filed at 2019 0513  Gross per 24 hour   Intake 710 ml   Output 825 ml   Net -115 ml     In: 710 [P.O.:600; I.V.:10]  Out: 825    Wt Readings from Last 3 Encounters:   19 235 lb 10.8 oz (106.9 kg)   19 236 lb (107 kg)   19 235 lb (106.6 kg)     Temp  Av °F (36.7 °C)  Min: 97.4 °F (36.3 °C)  Max: 98.5 °F (36.9 °C)  Pulse  Av.2  Min: 34  Max: 89  BP  Min: 81/61  Max: 172/63  SpO2  Av.2 %  Min: 90 %  Max: 96 %    · Telemetry: Sinus rhythm with v-pacing that tracks sinus rhythm  · Constitutional: Alert. Oriented to person, place, and time. No distress. · Head: Normocephalic and atraumatic.    · Mouth/Throat: Lips appear moist. Oropharynx is clear and moist.  · Eyes: Conjunctivae normal. EOM are normal.   · Neck: Neck supple. No lymphadenopathy. No rigidity. No JVD present. · Cardiovascular: Normal rate, regular rhythm. Normal S1&S2. Carotid pulse 2+ bilaterally. · Pulmonary/Chest: Bilateral respiratory sounds present. No respiratory accessory muscle use. No wheezes, No rhonchi. L upper chest surgical wound site is healing well without any exudate, erythema. · Abdominal: Soft. Normal bowel sounds present. No distension, No tenderness. No splenomegaly. No hernia. · Musculoskeletal: No tenderness. No edema    · Lymphadenopathy: Has no cervical adenopathy. · Neurological: Alert and oriented. Cranial nerve II-XII grossly intact, No gross deficit to touch. · Skin: Skin is warm and dry. No rash, lesions, ulcerations noted. · Psychiatric: No anxiety nor agitation. Labs, diagnostic and imaging results reviewed. Reviewed. Recent Labs     04/30/19  1227 05/01/19  0513    140   K 4.4 4.0    102   CO2 23 22   BUN 18 17   CREATININE 1.3* 1.0     Recent Labs     04/30/19  1227 05/01/19  0513   WBC 9.4 7.5   HGB 14.9 14.2   HCT 44.4 42.8   MCV 89.7 91.2    210     Lab Results   Component Value Date    CKTOTAL 60 04/03/2019    TROPONINI <0.01 04/30/2019     Estimated Creatinine Clearance: 59 mL/min (based on SCr of 1 mg/dL).    No results found for: BNP  No results found for: PROTIME, INR  Lab Results   Component Value Date    CHOL 175 04/03/2019    HDL 46 04/03/2019    HDL 49 09/15/2011    TRIG 200 04/03/2019       Scheduled Meds:   sodium chloride flush  10 mL Intravenous 2 times per day    insulin lispro  0-12 Units Subcutaneous TID WC    insulin lispro  0-6 Units Subcutaneous Nightly    ceFAZolin (ANCEF) IVPB  2 g Intravenous Q8H     Continuous Infusions:  PRN Meds:sodium chloride flush, ondansetron, acetaminophen, magnesium hydroxide     Patient Active Problem List    Diagnosis Date Noted    Hyperuricemia 04/30/2019    Bradycardia 04/30/2019    Heart block 04/30/2019    Pacemaker 04/30/2019    Orthostatic dizziness 10/03/2018    Lipoma 04/03/2017    Ganglion of wrist 04/03/2017    BREWSTER (dyspnea on exertion) 09/07/2016    Obesity, Class II, BMI 35-39.9 03/01/2016    Chronic kidney disease (CKD), stage III (moderate) (HCC)     Periodic limb movement disorder     Osteoarthritis     GERD (gastroesophageal reflux disease)     Mixed hyperlipidemia     TAZ on CPAP     Impaired glucose tolerance     Hypertension     Generalized anxiety disorder     Lipoma of skin     Myalgia       Active Hospital Problems    Diagnosis Date Noted    Heart block [I45.9] 04/30/2019    Chronic kidney disease (CKD), stage III (moderate) (HCC) [N18.3]     Mixed hyperlipidemia [E78.2]     Hypertension [I10]      Medtronic 2-chamber PPM interrogation 5/1/2019: battery voltage full capacity with estimated longevity of 12-14 years. Atrial lead: 494 ohms, [email or web address], 3.1mV. RV lead: 684 ohms, [email or web address], no R waves because escape rhythm very slow. AP 5.5%,  98%. Assessment and Plan:     Complete heart block  -s/p Medtronic 2-chamber PPM. Sinus rhythm underlying, with v-pacing that tracks sinus rhythm. -CXR shows normal lead positioning and no PTX  -device interrogation shows normal functioning. Patient safe to be discharged home today. Follow-up includes 1 week wound check with cardiology nurse. 3 months follow-up with Dr. Lupe Castro. Thank you for allowing me to participate in the care of this patient. If you have any questions, please do not hesitate to contact me.     Walter Green MD, MS, [facility], [facility]  Cardiac Electrophysiology  [address]  [address] [facility], [address] [facility]  [address] [address]  [phone]

## 2019-05-02 ENCOUNTER — TELEPHONE (OUTPATIENT)
Dept: INTERNAL MEDICINE CLINIC | Age: 74
End: 2019-05-02

## 2019-05-02 NOTE — TELEPHONE ENCOUNTER
[facility] Initial Follow Up Call    Outreach made within 2 business days of discharge: Yes    Patient: Marito Yañez Patient : 1945   MRN: [id number]  Reason for Admission: There are no discharge diagnoses documented for the most recent discharge.   Discharge Date: 19       Spoke with: voicemail    Discharge department/facility: [facility] 19-19    [place], [place]   Patient Services Specialist  [phone]

## 2019-05-03 DIAGNOSIS — R73.02 IMPAIRED GLUCOSE TOLERANCE: ICD-10-CM

## 2019-05-03 RX ORDER — METFORMIN HYDROCHLORIDE 500 MG/1
TABLET, EXTENDED RELEASE ORAL
Qty: 90 TABLET | Refills: 1 | Status: SHIPPED | OUTPATIENT
Start: 2019-05-03 | End: 2019-11-15 | Stop reason: SDUPTHER

## 2019-05-06 ENCOUNTER — OFFICE VISIT (OUTPATIENT)
Dept: INTERNAL MEDICINE CLINIC | Age: 74
End: 2019-05-06
Payer: MEDICARE

## 2019-05-06 VITALS
HEIGHT: 65 IN | DIASTOLIC BLOOD PRESSURE: 72 MMHG | SYSTOLIC BLOOD PRESSURE: 132 MMHG | HEART RATE: 68 BPM | WEIGHT: 232 LBS | BODY MASS INDEX: 38.65 KG/M2

## 2019-05-06 DIAGNOSIS — Z09 HOSPITAL DISCHARGE FOLLOW-UP: Primary | ICD-10-CM

## 2019-05-06 DIAGNOSIS — N18.30 CHRONIC KIDNEY DISEASE (CKD), STAGE III (MODERATE) (HCC): ICD-10-CM

## 2019-05-06 DIAGNOSIS — R73.02 IMPAIRED GLUCOSE TOLERANCE: ICD-10-CM

## 2019-05-06 DIAGNOSIS — E79.0 HYPERURICEMIA: ICD-10-CM

## 2019-05-06 DIAGNOSIS — E78.2 MIXED HYPERLIPIDEMIA: ICD-10-CM

## 2019-05-06 DIAGNOSIS — K21.9 GASTROESOPHAGEAL REFLUX DISEASE, ESOPHAGITIS PRESENCE NOT SPECIFIED: ICD-10-CM

## 2019-05-06 DIAGNOSIS — I44.2 COMPLETE HEART BLOCK (HCC): ICD-10-CM

## 2019-05-06 PROCEDURE — 1111F DSCHRG MED/CURRENT MED MERGE: CPT | Performed by: FAMILY MEDICINE

## 2019-05-06 PROCEDURE — 99495 TRANSJ CARE MGMT MOD F2F 14D: CPT | Performed by: FAMILY MEDICINE

## 2019-05-06 ASSESSMENT — ENCOUNTER SYMPTOMS
WHEEZING: 0
SHORTNESS OF BREATH: 0
CHEST TIGHTNESS: 0
COUGH: 0

## 2019-05-06 NOTE — PROGRESS NOTES
Post-Discharge Transitional Care Management Services or Hospital Follow Up      Benjamín Zaman   YOB: 1945    Date of Office Visit:  5/6/2019  Date of Hospital Admission: 4/30/19  Date of Hospital Discharge: 5/1/19  Readmission Risk Score(high >=14%.  Medium >=10%):Readmission Risk Score: 8      Care management risk score Rising risk (score 2-5) and Complex Care (Scores >=6): 1     Non face to face  following discharge, date last encounter closed (first attempt may have been earlier): 5/2/2019  1:30 PM 5/2/2019  1:30 PM    Call initiated 2 business days of discharge: Yes     Patient Active Problem List   Diagnosis    Osteoarthritis    GERD (gastroesophageal reflux disease)    Mixed hyperlipidemia    TAZ on CPAP    Impaired glucose tolerance    Hypertension    Generalized anxiety disorder    Lipoma of skin    Myalgia    Chronic kidney disease (CKD), stage III (moderate) (HCC)    Periodic limb movement disorder    Obesity, Class II, BMI 35-39.9    BREWSTER (dyspnea on exertion)    Lipoma    Ganglion of wrist    Orthostatic dizziness    Hyperuricemia    Bradycardia    Heart block    Pacemaker       Allergies   Allergen Reactions    Pollen Extract        Medications listed as ordered at the time of discharge from hospital   Ariana Aquino \"Leandro\"   Home Medication Instructions ELENA:    Printed on:05/06/19 1212   Medication Information                      amLODIPine (NORVASC) 2.5 MG tablet  TAKE 1 TABLET BY MOUTH EVERY DAY             aspirin 325 MG tablet  Take 325 mg by mouth daily             buPROPion (WELLBUTRIN XL) 150 MG extended release tablet  TAKE ONE TABLET BY MOUTH ONE TIME DAILY             cetirizine (ZYRTEC) 10 MG tablet  Take 10 mg by mouth daily as needed              diphenhydrAMINE (BENADRYL) 25 MG capsule  Take 25 mg by mouth nightly as needed for Itching             escitalopram (LEXAPRO) 10 MG tablet  TAKE 1 TABLET BY MOUTH DAILY             gabapentin (NEURONTIN) 300 MG capsule  TAKE ONE CAPSULE BY MOUTH THREE TIMES DAILY. losartan (COZAAR) 50 MG tablet  TAKE 1 TABLET BY MOUTH EVERY DAY             metFORMIN (GLUCOPHAGE-XR) 500 MG extended release tablet  TAKE 1 TABLET DAILY: END OF BREAKFAST OR AT BEDTIME. omeprazole (PRILOSEC) 20 MG capsule  Take 20 mg by mouth daily. simvastatin (ZOCOR) 20 MG tablet  TAKE 1 TABLET BY MOUTH NIGHTLY                   Medications marked \"taking\" at this time  Outpatient Medications Marked as Taking for the 5/6/19 encounter (Office Visit) with Alonso Mancilla MD   Medication Sig Dispense Refill    metFORMIN (GLUCOPHAGE-XR) 500 MG extended release tablet TAKE 1 TABLET DAILY: END OF BREAKFAST OR AT BEDTIME. 90 tablet 1    aspirin 325 MG tablet Take 325 mg by mouth daily      losartan (COZAAR) 50 MG tablet TAKE 1 TABLET BY MOUTH EVERY DAY 30 tablet 0    amLODIPine (NORVASC) 2.5 MG tablet TAKE 1 TABLET BY MOUTH EVERY DAY 30 tablet 0    simvastatin (ZOCOR) 20 MG tablet TAKE 1 TABLET BY MOUTH NIGHTLY 90 tablet 2    escitalopram (LEXAPRO) 10 MG tablet TAKE 1 TABLET BY MOUTH DAILY 30 tablet 5    buPROPion (WELLBUTRIN XL) 150 MG extended release tablet TAKE ONE TABLET BY MOUTH ONE TIME DAILY 90 tablet 3    diphenhydrAMINE (BENADRYL) 25 MG capsule Take 25 mg by mouth nightly as needed for Itching      omeprazole (PRILOSEC) 20 MG capsule Take 20 mg by mouth daily.  cetirizine (ZYRTEC) 10 MG tablet Take 10 mg by mouth daily as needed           Medications patient taking as of now reconciled against medications ordered at time of hospital discharge: Yes    Chief Complaint   Patient presents with   [address] from Hospital       [facility]  Admitted to hospital for Complete Heart block. Now has a pacemaker. Inpatient course: Discharge summary reviewed- see chart. Interval history/Current status:   Feels much better. Her fatigue, aching and BREWSTER have resolved.     She is grateful that she had the FU appointment since her pulse was normal at her visit with me in early April and that possibly she was developing intermittent block causing her symptoms but not found on exam.    Review of Systems   Respiratory: Negative for cough, chest tightness, shortness of breath and wheezing. Cardiovascular: Negative for chest pain, palpitations and leg swelling. Musculoskeletal: Negative for arthralgias and myalgias. Skin: Negative for rash and wound. Neurological: Negative for dizziness, syncope, facial asymmetry, speech difficulty, weakness, numbness and headaches. Vitals:    05/06/19 1159   BP: 132/72   Pulse: 68   Weight: 232 lb (105.2 kg)   Height: 5' 5\" (1.651 m)     Body mass index is 38.61 kg/m². Wt Readings from Last 3 Encounters:   05/06/19 232 lb (105.2 kg)   05/01/19 235 lb 10.8 oz (106.9 kg)   04/30/19 236 lb (107 kg)     BP Readings from Last 3 Encounters:   05/06/19 132/72   05/01/19 (!) 149/79   04/30/19 (!) 147/55     . Pulse Readings from Last 3 Encounters:   05/06/19 68   05/01/19 71   04/30/19 (!) 36       Physical Exam   Constitutional: She appears well-developed and well-nourished. Cardiovascular: Normal rate, regular rhythm and intact distal pulses. No extrasystoles are present. Exam reveals distant heart sounds. Exam reveals no gallop. No murmur heard. Pulses:       Carotid pulses are 2+ on the right side, and 2+ on the left side. Radial pulses are 2+ on the right side, and 2+ on the left side. Pulmonary/Chest: Effort normal and breath sounds normal.   Musculoskeletal: She exhibits no edema or deformity. Skin: Skin is warm and dry. No erythema. No pallor. Psychiatric: She has a normal mood and affect. Her behavior is normal. Cognition and memory are normal.   Vitals reviewed. Assessment/Plan:  1. Hospital discharge follow-up  Much improved since admission  - [place] DISCHARGE MEDS RECONCILED W/ CURRENT OUTPATIENT MED LIST    2.  Complete heart block [facility])  Now with permanent pacemaker. Cause of her symptoms. 3. Hyperuricemia  . Lab Results   Component Value Date    LABURIC 7.9 (H) 04/03/2019     High and given low purine diet information.   - Uric Acid; Future    4. Impaired glucose tolerance  Lab Results   Component Value Date    LABA1C 6.2 10/03/2018     At goal.  Recheck in fall for yearly monitoring   - TSH with Reflex; Future  - Hemoglobin A1C; Future    5. Mixed hyperlipidemia  Lab Results   Component Value Date    LDLCALC 89 04/03/2019     At goal and meeting medical guidelines. Continue treatment. On statin  - Lipid Panel; Future  - TSH with Reflex; Future    6. Chronic kidney disease (CKD), stage III (moderate) ([facility])  Est GFR 54  - Comprehensive Metabolic Panel; Future    7. Gastroesophageal reflux disease, esophagitis presence not specified  On daily PPI. Stable symptoms.  (last visit was to be 6 month FU and sent to hospital urgently)  - Comprehensive Metabolic Panel;  Future  - CBC Auto Differential; Future        Medical Decision Making: moderate complexity

## 2019-05-06 NOTE — PATIENT INSTRUCTIONS
the Search Health Information box to learn more about \"Purine-Restricted Diet: Care Instructions. \"     If you do not have an account, please click on the \"Sign Up Now\" link. Current as of: March 28, 2018  Content Version: 11.9  © 0690-8617 [employer]. Care instructions adapted under license by [facility] ([facility]). If you have questions about a medical condition or this instruction, always ask your healthcare professional. [address] any warranty or liability for your use of this information.

## 2019-05-08 ENCOUNTER — HOSPITAL ENCOUNTER (OUTPATIENT)
Dept: GENERAL RADIOLOGY | Age: 74
Discharge: HOME | End: 2019-05-08
Payer: MEDICARE

## 2019-05-08 ENCOUNTER — HOSPITAL ENCOUNTER (OUTPATIENT)
Age: 74
Discharge: HOME | End: 2019-05-08
Payer: MEDICARE

## 2019-05-08 ENCOUNTER — NURSE ONLY (OUTPATIENT)
Dept: CARDIOLOGY CLINIC | Age: 74
End: 2019-05-08

## 2019-05-08 ENCOUNTER — PROCEDURE VISIT (OUTPATIENT)
Dept: CARDIOLOGY CLINIC | Age: 74
End: 2019-05-08
Payer: MEDICARE

## 2019-05-08 DIAGNOSIS — I44.2 COMPLETE HEART BLOCK (HCC): ICD-10-CM

## 2019-05-08 DIAGNOSIS — Z95.0 S/P PLACEMENT OF CARDIAC PACEMAKER: Primary | ICD-10-CM

## 2019-05-08 DIAGNOSIS — Z95.0 S/P PLACEMENT OF CARDIAC PACEMAKER: ICD-10-CM

## 2019-05-08 DIAGNOSIS — Z95.0 PACEMAKER: ICD-10-CM

## 2019-05-08 PROCEDURE — 93280 PM DEVICE PROGR EVAL DUAL: CPT | Performed by: INTERNAL MEDICINE

## 2019-05-08 PROCEDURE — 71046 X-RAY EXAM CHEST 2 VIEWS: CPT

## 2019-05-13 ENCOUNTER — TELEPHONE (OUTPATIENT)
Dept: INTERNAL MEDICINE CLINIC | Age: 74
End: 2019-05-13

## 2019-05-13 NOTE — LETTER
[facility] Internal Medicine  [address] [address]  Phone: [phone]  Fax: [phone] Jass Hyatt MD        May 13, 2019     Patient: Cristina Hernandez   YOB: 1945   Date of Visit: 5/13/2019       To Whom It May Concern: It is my medical opinion that Gera Vora may return to work on Pastora 3, 2019. She can return to full duties of her prior position at that time. .    If you have any questions or concerns, please don't hesitate to call.     Sincerely,        Mc García MD

## 2019-05-13 NOTE — TELEPHONE ENCOUNTER
Phone call from patient. She is due to go back to work on Pastora 3. She will need a letter form [employer] saying she is ok to return to work. Asking that  mail it to her home when finished. Address was verified.

## 2019-05-16 ENCOUNTER — TELEPHONE (OUTPATIENT)
Dept: CARDIOLOGY CLINIC | Age: 74
End: 2019-05-16

## 2019-05-16 NOTE — TELEPHONE ENCOUNTER
Called Leandro to check on her symptoms. She states that she is not short of breath, she just feels that she has indigestion. She has tried to contact [employer] but was on hold for to long and she is going to try and call tomorrow. She says if the pain does get worse she will go to the ER. She will call tomorrow with an update.

## 2019-05-16 NOTE — TELEPHONE ENCOUNTER
Cassius Howard" is calling stating she just go a pacemaker implanted and has had her 1 week site check and device check on 5/8/2019. She is complaining of indigestion and is nervous that it may have something to do with her pacemaker. When she was here she was told  She does not know how to send in a manual reading and she has called [employer] several times with no one answering. I have called the [place] office to see if Alyssa can help and she is in a room with a patient.     Adrienne Carrillo can be reached at [phone] and would like a call back today please

## 2019-05-16 NOTE — TELEPHONE ENCOUNTER
Patient S/P PPM d/t CHB/bradycardia- symptomatic with fatigue, SOB on exertion. I called and spoke with the patient- She states having pain in her chest area and her back between her shoulder blades when taking a deep breath in. She is concerned about this and isn't sure if she should go to the ED. She was told at her 1 week device/wound check that \"it had moved a little\" but was stable. Assuming she was referring to the device. .. She did go for a chest x-ray on 5/8 which did not show any pneumonia. Patient does sound winded, nasally. ... But she denies having any seasonal allergies or feelings of URI/bronchitis. I advised the patient I would send a message to the nurses but if she feels like it is not improving or is worsening that she should go to the ED. She v/u.     Patient can be reached at [phone]

## 2019-05-28 RX ORDER — GABAPENTIN 300 MG/1
CAPSULE ORAL
Qty: 270 CAPSULE | Refills: 1 | Status: SHIPPED | OUTPATIENT
Start: 2019-05-28 | End: 2019-12-04 | Stop reason: SDUPTHER

## 2019-06-23 DIAGNOSIS — I10 ESSENTIAL HYPERTENSION: ICD-10-CM

## 2019-06-24 RX ORDER — AMLODIPINE BESYLATE 2.5 MG/1
TABLET ORAL
Qty: 30 TABLET | Refills: 0 | Status: SHIPPED | OUTPATIENT
Start: 2019-06-24 | End: 2019-07-15 | Stop reason: SDUPTHER

## 2019-06-24 RX ORDER — LOSARTAN POTASSIUM 50 MG/1
TABLET ORAL
Qty: 30 TABLET | Refills: 0 | Status: SHIPPED | OUTPATIENT
Start: 2019-06-24 | End: 2019-07-15 | Stop reason: SDUPTHER

## 2019-07-15 DIAGNOSIS — I10 ESSENTIAL HYPERTENSION: ICD-10-CM

## 2019-07-15 RX ORDER — AMLODIPINE BESYLATE 2.5 MG/1
2.5 TABLET ORAL DAILY
Qty: 90 TABLET | Refills: 0 | Status: SHIPPED | OUTPATIENT
Start: 2019-07-15 | End: 2019-10-08 | Stop reason: SDUPTHER

## 2019-07-15 RX ORDER — LOSARTAN POTASSIUM 50 MG/1
50 TABLET ORAL DAILY
Qty: 90 TABLET | Refills: 0 | Status: SHIPPED | OUTPATIENT
Start: 2019-07-15 | End: 2019-10-31 | Stop reason: SDUPTHER

## 2019-07-15 NOTE — TELEPHONE ENCOUNTER
CLINICAL PHARMACY: ADHERENCE REVIEW    Identified care gap per Jesus insurance: Adherence    Per records:  METFORMIN TAB 500MG ER  Last Fill Date:05/25/2019  Day Supply of Fill: 90  Refill Due Date: 08/23/20     LOSARTAN POT TAB 50MG Last Fill Date:05/25/2019  Day Supply of Fill: 30  Refill Due Date: 06/24/20    Notes: Per Reconcile Medication Dispenses in Care Path:  LOSARTAN POT TAB 50MG   05/25/2019 #30   04/29/2019 #30   02/04/2019 #90       Per [facility]  [phone]    LOSARTAN POT TAB 50MG last filled on 7/2/19 for a 30-day supply billed thru patient's insurance. NR    Denies any skipped or missing doses. Advised her to check with pharmacy in a few days to f/u with 90 day refills. Prescriber: Lis Maria    · Patient is interested in changing back to a 90-day supply for losartan 50mg QD  · She would also like a refill authorization for her amLODIPine 2.5mg QD 90DS.

## 2019-07-15 NOTE — TELEPHONE ENCOUNTER
For Pharmacy Admin Tracking Only    PHSO: Yes  Total # of Interventions Recommended: 1  - New Order #: 1 New Medication Order Reason(s):  Adherence  - Refills Provided #: 2  - Maintenance Safety Lab Monitoring #: 1  - New Therapy Lab Monitoring #: 1  Recommended intervention potential cost savings: 1  Total Interventions Accepted: 1  Time Spent (min): 15

## 2019-08-05 NOTE — PROGRESS NOTES
ventricular lead has an intrinsic R-wave amplitude recording of > 20 mV, impedance of 399 ohms, and pacing capture threshold of 2.5 V @ 0.4ms. There has been 3 atrial and no ventricular high rate episodes. 3 episodes of atrial fibrillation that last anywhere from 1 to 12 hours in duration. Atrial pacing frequency of 5.7 %, ventricular pacing frequency of 95.6%. Atrial fibrillation, paroxysmal  -Seen on recent device interrogation as above. Paroxysmal atrial fibrillation. -[id number] Score 5 (HTN, AGE, Female Gender, DM)  -I discussed oral anticoagulation to decrease the risk of thromboembolic events including stroke. Benefits and alternatives were discussed with patient. Risk of bleeding was discussed. Patient verbalized understanding. Different forms of anticoagulants including warfarin (Coumadin), Dabigatran (Pradaxa), Rivaroxaban (Xarelto), Apixaban (Eliquis), and Edoxaban [facility]) were discussed. Patient opted to start with Eliquis  (Crea 1.0,5/1/19)  -Start Eliquis 5 mg BID for stroke risk reduction.  -Stop  mg daily today, patient states she does not usually take this medication.  -As to not overwhelm the patient with to much information will schedule a follow up in 2 months' time to discuss treatment options for afib. Thank you for allowing me to participate in the care of [address] [place] [address]. All questions and concerns were addressed to the patient/family. Alternatives to my treatment were discussed. This note was scribed in the presence of Dr. Ilsa Villalpando MD by Franci Barber RN. Follow up in 2 months to discuss treatment options for afib. The scribe's documentation has been prepared under my direction and personally reviewed by me in its entirety. I confirm that the note above accurately reflects all work, physical examination, the discussion of treatments and procedures, and medical decision making performed by me.     Ilsa Villalpando MD, MS, [facility] [place], [facility]  Cardiac

## 2019-08-07 ENCOUNTER — PROCEDURE VISIT (OUTPATIENT)
Dept: CARDIOLOGY CLINIC | Age: 74
End: 2019-08-07
Payer: MEDICARE

## 2019-08-07 ENCOUNTER — OFFICE VISIT (OUTPATIENT)
Dept: CARDIOLOGY CLINIC | Age: 74
End: 2019-08-07
Payer: MEDICARE

## 2019-08-07 VITALS
DIASTOLIC BLOOD PRESSURE: 70 MMHG | HEART RATE: 79 BPM | SYSTOLIC BLOOD PRESSURE: 122 MMHG | BODY MASS INDEX: 39.32 KG/M2 | WEIGHT: 236 LBS | HEIGHT: 65 IN | OXYGEN SATURATION: 96 %

## 2019-08-07 DIAGNOSIS — I44.2 COMPLETE HEART BLOCK (HCC): ICD-10-CM

## 2019-08-07 DIAGNOSIS — Z95.0 S/P PLACEMENT OF CARDIAC PACEMAKER: Primary | ICD-10-CM

## 2019-08-07 DIAGNOSIS — R00.1 BRADYCARDIA: ICD-10-CM

## 2019-08-07 DIAGNOSIS — Z95.0 PACEMAKER: ICD-10-CM

## 2019-08-07 DIAGNOSIS — I48.91 ATRIAL FIBRILLATION, UNSPECIFIED TYPE (HCC): ICD-10-CM

## 2019-08-07 PROCEDURE — 3017F COLORECTAL CA SCREEN DOC REV: CPT | Performed by: INTERNAL MEDICINE

## 2019-08-07 PROCEDURE — 93280 PM DEVICE PROGR EVAL DUAL: CPT | Performed by: INTERNAL MEDICINE

## 2019-08-07 PROCEDURE — 93000 ELECTROCARDIOGRAM COMPLETE: CPT | Performed by: INTERNAL MEDICINE

## 2019-08-07 PROCEDURE — G8399 PT W/DXA RESULTS DOCUMENT: HCPCS | Performed by: INTERNAL MEDICINE

## 2019-08-07 PROCEDURE — G8417 CALC BMI ABV UP PARAM F/U: HCPCS | Performed by: INTERNAL MEDICINE

## 2019-08-07 PROCEDURE — 1036F TOBACCO NON-USER: CPT | Performed by: INTERNAL MEDICINE

## 2019-08-07 PROCEDURE — 1123F ACP DISCUSS/DSCN MKR DOCD: CPT | Performed by: INTERNAL MEDICINE

## 2019-08-07 PROCEDURE — 1090F PRES/ABSN URINE INCON ASSESS: CPT | Performed by: INTERNAL MEDICINE

## 2019-08-07 PROCEDURE — 99215 OFFICE O/P EST HI 40 MIN: CPT | Performed by: INTERNAL MEDICINE

## 2019-08-07 PROCEDURE — 4040F PNEUMOC VAC/ADMIN/RCVD: CPT | Performed by: INTERNAL MEDICINE

## 2019-08-07 PROCEDURE — G8427 DOCREV CUR MEDS BY ELIG CLIN: HCPCS | Performed by: INTERNAL MEDICINE

## 2019-08-07 NOTE — LETTER
her mother and son; Thyroid Disease in her maternal aunt. Reviewed. Denies family history of sudden cardiac death, arrhythmia, premature CAD    Review of System:    · General ROS: negative for - chills, fever   · Psychological ROS: negative for - anxiety or depression  · Ophthalmic ROS: negative for - eye pain or loss of vision  · ENT ROS: negative for - epistaxis, headaches, nasal discharge, sore throat   · Allergy and Immunology ROS: negative for - hives, nasal congestion   · Hematological and Lymphatic ROS: negative for - bleeding problems, blood clots, bruising or jaundice  · Endocrine ROS: negative for - skin changes, temperature intolerance or unexpected weight changes  · Respiratory ROS: negative for - cough, hemoptysis, pleuritic pain, SOB, sputum changes or wheezing  · Cardiovascular ROS: Per HPI. +LCW device  · Gastrointestinal ROS: negative for - abdominal pain, blood in stools, diarrhea, hematemesis, melena, nausea/vomiting or swallowing difficulty/pain  · Genito-Urinary ROS: negative for - dysuria or incontinence  · Musculoskeletal ROS: negative for - joint swelling or muscle pain  · Neurological ROS: negative for - confusion, dizziness, gait disturbance, headaches, numbness/tingling, seizures, speech problems, tremors, visual changes or weakness  · Dermatological ROS: negative for - rash    Physical Examination:  Vitals:    08/07/19 1039   BP: 122/70   Pulse: 79   SpO2: 96%       · Constitutional: Oriented. No distress. · Head: Normocephalic and atraumatic. · Mouth/Throat: Oropharynx is clear and moist.   · Eyes: Conjunctivae normal. EOM are normal.   · Neck: Normal range of motion. Neck supple. No rigidity. No JVD present. · Cardiovascular: Normal rate, regular rhythm, S1&S2 and intact distal pulses. · Pulmonary/Chest: Bilateral respiratory sounds. No wheezes. No rhonchi. · Abdominal: Soft. Bowel sounds present. No distension, No tenderness. · Musculoskeletal: No tenderness. No edema    · Lymphadenopathy: Has no cervical adenopathy. · Neurological: Alert and oriented. Cranial nerve appears intact, No Gross deficit   · Skin: Skin is warm and dry. No rash noted. · Psychiatric: Has a normal mood, affect and behavior     Labs:  Reviewed. EC19 reviewed,ventricular paced beats with underlying sinus rhythm that is being tracked v-rate of 77 bpm with QRS duration 140 ms. No pathologic Q waves, ventricular pre-excitation, or QT prolongation. Studies:   1. Event monitor:   None on file. 2. Echo: 19  Summary  Left ventricular cavity size is normal. Normal left ventricular wall thickness. Overall left ventricular systolic function appears normal. Ejection fraction is visually estimated to be 60%. Mitral annular calcification is present. Mild mitral regurgitation. No evidence of mitral stenosis. Normal right ventricular size and function. 3. Stress Test:    None on file    4. Cath:   None on file    The MCOT, echocardiogram, stress test, and coronary angiography/PCI were reviewed by myself and used for my plan of care. Procedures:  1. Implantation of Medtronic dual chamber PPM on 19. Assessment/Plan:   Complete heart block   -s/p Implantation of Medtronic dual chamber PPM 19.  -Device interrogated today showed normal device functioning. -RV lead not seated in the ideal position, may be indicative of a micro-dislodgement, and it is using more battery power to pace. -Discussed performing a lead revision to seat the lead in a better position with more slack to increase battery life of PPM. Patient would like to deliberate further before making decision to  proceed with lead revision and will contact my nurse Jono Martinez if she decides to proceed. -Medtronic 2-chamber PPM, implanted 19: Battery with estimated longevity of 5.8 years.  The right atrial lead has an intrinsic p-wave

## 2019-08-15 ENCOUNTER — TELEPHONE (OUTPATIENT)
Dept: INTERNAL MEDICINE CLINIC | Age: 74
End: 2019-08-15

## 2019-08-15 PROBLEM — R00.1 BRADYCARDIA: Status: RESOLVED | Noted: 2019-04-30 | Resolved: 2019-08-15

## 2019-08-15 PROBLEM — R42 ORTHOSTATIC DIZZINESS: Status: RESOLVED | Noted: 2018-10-03 | Resolved: 2019-08-15

## 2019-08-15 NOTE — TELEPHONE ENCOUNTER
Pt saw Dr Nedra Montanez and was told that her battery was a little low on her pacemaker  And she is concerned if true and if it needs to be done ( surgery should she make appt with you for pre op - Dr Nedra Montanez note in computer

## 2019-08-21 ENCOUNTER — TELEPHONE (OUTPATIENT)
Dept: CARDIOLOGY CLINIC | Age: 74
End: 2019-08-21

## 2019-08-21 NOTE — TELEPHONE ENCOUNTER
Edison Murray called in this morning wanting to schedule her procedure:     Last seen Dr. Cleopatra Reddy 8/7/19    Assessment/ Plan:  Discussed performing a lead revision to seat the lead in a better position with more slack to increase battery life of PPM.      You can reach Edison Murray at #[phone]

## 2019-09-13 DIAGNOSIS — Z01.812 PRE-PROCEDURE LAB EXAM: Primary | ICD-10-CM

## 2019-09-13 DIAGNOSIS — I44.2 COMPLETE HEART BLOCK (HCC): ICD-10-CM

## 2019-09-13 LAB
ANION GAP SERPL CALCULATED.3IONS-SCNC: 17 MMOL/L (ref 3–16)
BUN BLDV-MCNC: 14 MG/DL (ref 7–20)
CALCIUM SERPL-MCNC: 9.4 MG/DL (ref 8.3–10.6)
CHLORIDE BLD-SCNC: 98 MMOL/L (ref 99–110)
CO2: 23 MMOL/L (ref 21–32)
CREAT SERPL-MCNC: 0.9 MG/DL (ref 0.6–1.2)
GFR AFRICAN AMERICAN: >60
GFR NON-AFRICAN AMERICAN: >60
GLUCOSE BLD-MCNC: 135 MG/DL (ref 70–99)
HCT VFR BLD CALC: 46.4 % (ref 36–48)
HEMOGLOBIN: 15.6 G/DL (ref 12–16)
MCH RBC QN AUTO: 30.2 PG (ref 26–34)
MCHC RBC AUTO-ENTMCNC: 33.6 G/DL (ref 31–36)
MCV RBC AUTO: 90.1 FL (ref 80–100)
PDW BLD-RTO: 13.8 % (ref 12.4–15.4)
PLATELET # BLD: 279 K/UL (ref 135–450)
PMV BLD AUTO: 7.3 FL (ref 5–10.5)
POTASSIUM SERPL-SCNC: 4.9 MMOL/L (ref 3.5–5.1)
RBC # BLD: 5.16 M/UL (ref 4–5.2)
SODIUM BLD-SCNC: 138 MMOL/L (ref 136–145)
WBC # BLD: 7.6 K/UL (ref 4–11)

## 2019-09-20 ENCOUNTER — APPOINTMENT (OUTPATIENT)
Dept: GENERAL RADIOLOGY | Age: 74
End: 2019-09-20
Attending: INTERNAL MEDICINE
Payer: MEDICARE

## 2019-09-20 ENCOUNTER — HOSPITAL ENCOUNTER (OUTPATIENT)
Dept: CARDIAC CATH/INVASIVE PROCEDURES | Age: 74
Discharge: HOME | End: 2019-09-21
Attending: INTERNAL MEDICINE | Admitting: INTERNAL MEDICINE
Payer: MEDICARE

## 2019-09-20 DIAGNOSIS — I44.2 COMPLETE HEART BLOCK (HCC): ICD-10-CM

## 2019-09-20 DIAGNOSIS — I48.91 ATRIAL FIBRILLATION, UNSPECIFIED TYPE (HCC): ICD-10-CM

## 2019-09-20 PROBLEM — T82.9XXA PACEMAKER COMPLICATIONS, INITIAL ENCOUNTER: Status: ACTIVE | Noted: 2019-09-20

## 2019-09-20 PROBLEM — T82.110A PACEMAKER LEAD FAILURE: Status: ACTIVE | Noted: 2019-09-20

## 2019-09-20 LAB
EKG ATRIAL RATE: 79 BPM
EKG DIAGNOSIS: NORMAL
EKG P AXIS: 53 DEGREES
EKG P-R INTERVAL: 176 MS
EKG Q-T INTERVAL: 458 MS
EKG QRS DURATION: 160 MS
EKG QTC CALCULATION (BAZETT): 525 MS
EKG R AXIS: -89 DEGREES
EKG T AXIS: 56 DEGREES
EKG VENTRICULAR RATE: 79 BPM
GLUCOSE BLD-MCNC: 109 MG/DL (ref 70–99)
GLUCOSE BLD-MCNC: 120 MG/DL (ref 70–99)
GLUCOSE BLD-MCNC: 148 MG/DL (ref 70–99)
PERFORMED ON: ABNORMAL

## 2019-09-20 PROCEDURE — 93010 ELECTROCARDIOGRAM REPORT: CPT | Performed by: INTERNAL MEDICINE

## 2019-09-20 PROCEDURE — 2720000010 HC SURG SUPPLY STERILE

## 2019-09-20 PROCEDURE — C1894 INTRO/SHEATH, NON-LASER: HCPCS

## 2019-09-20 PROCEDURE — 2500000003 HC RX 250 WO HCPCS

## 2019-09-20 PROCEDURE — 99152 MOD SED SAME PHYS/QHP 5/>YRS: CPT | Performed by: FAMILY MEDICINE

## 2019-09-20 PROCEDURE — 93005 ELECTROCARDIOGRAM TRACING: CPT | Performed by: INTERNAL MEDICINE

## 2019-09-20 PROCEDURE — 71045 X-RAY EXAM CHEST 1 VIEW: CPT

## 2019-09-20 PROCEDURE — 33215 REPOSITION PACING-DEFIB LEAD: CPT | Performed by: INTERNAL MEDICINE

## 2019-09-20 PROCEDURE — 6360000002 HC RX W HCPCS: Performed by: INTERNAL MEDICINE

## 2019-09-20 PROCEDURE — 6360000002 HC RX W HCPCS

## 2019-09-20 PROCEDURE — 2580000003 HC RX 258: Performed by: INTERNAL MEDICINE

## 2019-09-20 PROCEDURE — 99153 MOD SED SAME PHYS/QHP EA: CPT | Performed by: FAMILY MEDICINE

## 2019-09-20 PROCEDURE — 99152 MOD SED SAME PHYS/QHP 5/>YRS: CPT | Performed by: INTERNAL MEDICINE

## 2019-09-20 PROCEDURE — 94760 N-INVAS EAR/PLS OXIMETRY 1: CPT

## 2019-09-20 PROCEDURE — 6370000000 HC RX 637 (ALT 250 FOR IP): Performed by: INTERNAL MEDICINE

## 2019-09-20 PROCEDURE — 2580000003 HC RX 258

## 2019-09-20 PROCEDURE — 33215 REPOSITION PACING-DEFIB LEAD: CPT | Performed by: FAMILY MEDICINE

## 2019-09-20 RX ORDER — GABAPENTIN 300 MG/1
300 CAPSULE ORAL 3 TIMES DAILY
Status: DISCONTINUED | OUTPATIENT
Start: 2019-09-20 | End: 2019-09-21 | Stop reason: HOSPADM

## 2019-09-20 RX ORDER — BUPROPION HYDROCHLORIDE 150 MG/1
150 TABLET ORAL DAILY
Status: DISCONTINUED | OUTPATIENT
Start: 2019-09-21 | End: 2019-09-21 | Stop reason: HOSPADM

## 2019-09-20 RX ORDER — SODIUM CHLORIDE 0.9 % (FLUSH) 0.9 %
10 SYRINGE (ML) INJECTION EVERY 12 HOURS SCHEDULED
Status: DISCONTINUED | OUTPATIENT
Start: 2019-09-20 | End: 2019-09-20 | Stop reason: SDUPTHER

## 2019-09-20 RX ORDER — ESCITALOPRAM OXALATE 10 MG/1
10 TABLET ORAL DAILY
Status: DISCONTINUED | OUTPATIENT
Start: 2019-09-21 | End: 2019-09-21 | Stop reason: HOSPADM

## 2019-09-20 RX ORDER — PANTOPRAZOLE SODIUM 40 MG/1
40 TABLET, DELAYED RELEASE ORAL
Status: DISCONTINUED | OUTPATIENT
Start: 2019-09-20 | End: 2019-09-21 | Stop reason: HOSPADM

## 2019-09-20 RX ORDER — AMLODIPINE BESYLATE 5 MG/1
2.5 TABLET ORAL DAILY
Status: DISCONTINUED | OUTPATIENT
Start: 2019-09-21 | End: 2019-09-21 | Stop reason: HOSPADM

## 2019-09-20 RX ORDER — LOSARTAN POTASSIUM 50 MG/1
50 TABLET ORAL DAILY
Status: DISCONTINUED | OUTPATIENT
Start: 2019-09-21 | End: 2019-09-21 | Stop reason: HOSPADM

## 2019-09-20 RX ORDER — SODIUM CHLORIDE 0.9 % (FLUSH) 0.9 %
10 SYRINGE (ML) INJECTION PRN
Status: DISCONTINUED | OUTPATIENT
Start: 2019-09-20 | End: 2019-09-21 | Stop reason: HOSPADM

## 2019-09-20 RX ORDER — SIMVASTATIN 20 MG
20 TABLET ORAL NIGHTLY
Status: DISCONTINUED | OUTPATIENT
Start: 2019-09-20 | End: 2019-09-21 | Stop reason: HOSPADM

## 2019-09-20 RX ORDER — SODIUM CHLORIDE 9 MG/ML
INJECTION, SOLUTION INTRAVENOUS CONTINUOUS
Status: DISCONTINUED | OUTPATIENT
Start: 2019-09-20 | End: 2019-09-20

## 2019-09-20 RX ORDER — DIPHENHYDRAMINE HCL 25 MG
25 TABLET ORAL NIGHTLY PRN
Status: DISCONTINUED | OUTPATIENT
Start: 2019-09-20 | End: 2019-09-21 | Stop reason: HOSPADM

## 2019-09-20 RX ORDER — SODIUM CHLORIDE 0.9 % (FLUSH) 0.9 %
10 SYRINGE (ML) INJECTION PRN
Status: DISCONTINUED | OUTPATIENT
Start: 2019-09-20 | End: 2019-09-20 | Stop reason: SDUPTHER

## 2019-09-20 RX ORDER — SODIUM CHLORIDE 0.9 % (FLUSH) 0.9 %
10 SYRINGE (ML) INJECTION EVERY 12 HOURS SCHEDULED
Status: DISCONTINUED | OUTPATIENT
Start: 2019-09-20 | End: 2019-09-21 | Stop reason: HOSPADM

## 2019-09-20 RX ORDER — METFORMIN HYDROCHLORIDE 500 MG/1
500 TABLET, EXTENDED RELEASE ORAL
Status: DISCONTINUED | OUTPATIENT
Start: 2019-09-21 | End: 2019-09-21 | Stop reason: HOSPADM

## 2019-09-20 RX ORDER — ACETAMINOPHEN 325 MG/1
650 TABLET ORAL EVERY 4 HOURS PRN
Status: DISCONTINUED | OUTPATIENT
Start: 2019-09-20 | End: 2019-09-21 | Stop reason: HOSPADM

## 2019-09-20 RX ADMIN — SODIUM CHLORIDE, PRESERVATIVE FREE 10 ML: 5 INJECTION INTRAVENOUS at 19:54

## 2019-09-20 RX ADMIN — GABAPENTIN 300 MG: 300 CAPSULE ORAL at 19:54

## 2019-09-20 RX ADMIN — CEFAZOLIN 2 G: 10 INJECTION, POWDER, FOR SOLUTION INTRAVENOUS; PARENTERAL at 15:43

## 2019-09-20 RX ADMIN — GABAPENTIN 300 MG: 300 CAPSULE ORAL at 15:43

## 2019-09-20 RX ADMIN — SIMVASTATIN 20 MG: 20 TABLET, FILM COATED ORAL at 19:54

## 2019-09-20 RX ADMIN — ACETAMINOPHEN 650 MG: 325 TABLET ORAL at 19:53

## 2019-09-20 RX ADMIN — PANTOPRAZOLE SODIUM 40 MG: 40 TABLET, DELAYED RELEASE ORAL at 15:43

## 2019-09-20 RX ADMIN — CEFAZOLIN 2 G: 10 INJECTION, POWDER, FOR SOLUTION INTRAVENOUS; PARENTERAL at 22:32

## 2019-09-20 RX ADMIN — SODIUM CHLORIDE, PRESERVATIVE FREE 10 ML: 5 INJECTION INTRAVENOUS at 15:44

## 2019-09-20 ASSESSMENT — PAIN DESCRIPTION - LOCATION: LOCATION: SHOULDER

## 2019-09-20 ASSESSMENT — PAIN - FUNCTIONAL ASSESSMENT: PAIN_FUNCTIONAL_ASSESSMENT: ACTIVITIES ARE NOT PREVENTED

## 2019-09-20 ASSESSMENT — PAIN SCALES - GENERAL
PAINLEVEL_OUTOF10: 2
PAINLEVEL_OUTOF10: 0

## 2019-09-20 ASSESSMENT — PAIN DESCRIPTION - ONSET: ONSET: GRADUAL

## 2019-09-20 ASSESSMENT — PAIN DESCRIPTION - DESCRIPTORS: DESCRIPTORS: ACHING;SORE

## 2019-09-20 ASSESSMENT — PAIN DESCRIPTION - FREQUENCY: FREQUENCY: CONTINUOUS

## 2019-09-20 ASSESSMENT — PAIN DESCRIPTION - ORIENTATION: ORIENTATION: LEFT

## 2019-09-20 ASSESSMENT — PAIN DESCRIPTION - PAIN TYPE: TYPE: ACUTE PAIN

## 2019-09-20 ASSESSMENT — PAIN DESCRIPTION - PROGRESSION: CLINICAL_PROGRESSION: GRADUALLY WORSENING

## 2019-09-20 NOTE — PROGRESS NOTES
Pt arrived to 5117 from EP lab. Rt groin soft. No s/s hematoma. Monitor 39 on pt rate verified with cmu.   Call light with in reach

## 2019-09-20 NOTE — H&P
[address]   Cardiac Electrophysiology Consultation   Date: 9/20/2019  Reason for Consultation: Complete heart block, PPM in situ, new onset afib  Consult Requesting Physician: Rosenda Campos MD     Chief Complaint:        Chief Complaint   Patient presents with    Follow-Up from Hospital       PPM implant         HPI: Estrada Charles is a 74 y.o. with a past medical history significant for diabetes, TAZ on CPAP, CKD stage III , HTN , anxiety. She presented to [facility] ED on 4/30/19 as instructed by her PCP when she was found to be bradycardiac with HR in the 30's during office vist. She reported symptoms of lightheadedness tunnel vision and feeling like she was going to pass out. Upon arrival to ED EKG showed third degree AV block, rate 36, prolonged QRS and RSR pattern which was seen previously on EKG from 5/15/2015, no ST elevation or depression, normal T waves. She subsequently underwent implantation of Medtronic dual chamber PPM on 4/30/19.     Interval History: Today, she presents to office for follow up s/p Medtronic dual chamber PPC on 4/30/19 device management. She is compliant with her medications and tolerating them well.  She denies chest pain/pressure, tightness, edema, shortness of breath, heart racing, palpitations, lightheadedness, dizziness, syncope, presyncope,  PND or orthopnea.      Past Medical History        Past Medical History:   Diagnosis Date    Abnormal EKG       RBBB < 2008    Abnormal glucose tolerance test      Cataract      Chronic kidney disease (CKD), stage III (moderate) ([facility])      Elbow fracture 6/5/2008    Generalized anxiety disorder      GERD (gastroesophageal reflux disease)      Herpes zoster 8/2015    Hypertension      Lipoma of skin      Mixed hyperlipidemia      Myalgia and myositis      Obstructive sleep apnea      Osteoarthritis      Periodic limb movement disorder      Psoriasis      Seasonal allergic rhinitis       pollens    Tubular · Abdominal: Soft. Bowel sounds present. No distension, No tenderness. · Musculoskeletal: No tenderness. No edema    · Lymphadenopathy: Has no cervical adenopathy. · Neurological: Alert and oriented. Cranial nerve appears intact, No Gross deficit   · Skin: Skin is warm and dry. No rash noted. · Psychiatric: Has a normal mood, affect and behavior      Labs:  Reviewed.      EC19 reviewed,ventricular paced beats with underlying sinus rhythm that is being tracked v-rate of 77 bpm with QRS duration 140 ms. No pathologic Q waves, ventricular pre-excitation, or QT prolongation.      Studies:   1. Event monitor:   None on file.     2. Echo: 19  Summary  Left ventricular cavity size is normal. Normal left ventricular wall thickness. Overall left ventricular systolic function appears normal. Ejection fraction is visually estimated to be 60%. Mitral annular calcification is present. Mild mitral regurgitation. No evidence of mitral stenosis. Normal right ventricular size and function.     3. Stress Test:    None on file     4. Cath:   None on file     The MCOT, echocardiogram, stress test, and coronary angiography/PCI were reviewed by myself and used for my plan of care. Procedures:  1. Implantation of Medtronic dual chamber PPM on 19.     Assessment/Plan:   Complete heart block   -s/p Implantation of Medtronic dual chamber PPM 19.  -Device interrogated today showed normal device functioning. -RV lead not seated in the ideal position, may be indicative of a micro-dislodgement, and it is using more battery power to pace. -Discussed performing a lead revision to seat the lead in a better position with more slack to increase battery life of PPM. Patient would like to deliberate further before making decision to  proceed with lead revision and will contact my nurse Capri Boyce if she decides to proceed. -Medtronic 2-chamber PPM, implanted 19: Battery with estimated longevity of 5.8 years. The right atrial lead has an intrinsic p-wave amplitude recording of 1.4 mV, impedance of 399 ohms, and pacing capture threshold of 0.5 V @ 0.4ms. The right ventricular lead has an intrinsic R-wave amplitude recording of > 20 mV, impedance of 399 ohms, and pacing capture threshold of 2.5 V @ 0.4ms. There has been 3 atrial and no ventricular high rate episodes. 3 episodes of atrial fibrillation that last anywhere from 1 to 12 hours in duration. Atrial pacing frequency of 5.7 %, ventricular pacing frequency of 95.6%.     Atrial fibrillation, paroxysmal  -Seen on recent device interrogation as above. Paroxysmal atrial fibrillation. -[id number] Score 5 (HTN, AGE, Female Gender, DM)  -I discussed oral anticoagulation to decrease the risk of thromboembolic events including stroke. Benefits and alternatives were discussed with patient. Risk of bleeding was discussed. Patient verbalized understanding. Different forms of anticoagulants including warfarin (Coumadin), Dabigatran (Pradaxa), Rivaroxaban (Xarelto), Apixaban (Eliquis), and Edoxaban [facility]) were discussed. Patient opted to start with Eliquis  (Crea 1.0,5/1/19)  -Start Eliquis 5 mg BID for stroke risk reduction.  -Stop  mg daily today, patient states she does not usually take this medication.  -As to not overwhelm the patient with to much information will schedule a follow up in 2 months' time to discuss treatment options for afib. Follow up in 2 months to discuss treatment options for afib. Thank you for allowing me to participate in the care of [address] [place] [address]. All questions and concerns were addressed to the patient/family. Alternatives to my treatment were discussed.      I have reviewed the history and physical and examined the patient and find no relevant changes. I have reviewed with the patient and/or family the risks, benefits, and alternatives to the procedure.     Daniela Ferrara MD, MS, [facility], [place] [address] Electrophysiology  [facility]

## 2019-09-21 VITALS
WEIGHT: 238.1 LBS | HEART RATE: 67 BPM | HEIGHT: 65 IN | OXYGEN SATURATION: 92 % | TEMPERATURE: 98 F | DIASTOLIC BLOOD PRESSURE: 80 MMHG | SYSTOLIC BLOOD PRESSURE: 118 MMHG | BODY MASS INDEX: 39.67 KG/M2 | RESPIRATION RATE: 16 BRPM

## 2019-09-21 LAB
GLUCOSE BLD-MCNC: 130 MG/DL (ref 70–99)
PERFORMED ON: ABNORMAL

## 2019-09-21 PROCEDURE — 2580000003 HC RX 258: Performed by: INTERNAL MEDICINE

## 2019-09-21 PROCEDURE — 99217 PR OBSERVATION CARE DISCHARGE MANAGEMENT: CPT | Performed by: INTERNAL MEDICINE

## 2019-09-21 PROCEDURE — 6370000000 HC RX 637 (ALT 250 FOR IP): Performed by: INTERNAL MEDICINE

## 2019-09-21 RX ADMIN — BUPROPION HYDROCHLORIDE 150 MG: 150 TABLET, FILM COATED, EXTENDED RELEASE ORAL at 09:35

## 2019-09-21 RX ADMIN — LOSARTAN POTASSIUM 50 MG: 50 TABLET ORAL at 09:36

## 2019-09-21 RX ADMIN — ESCITALOPRAM OXALATE 10 MG: 10 TABLET ORAL at 09:35

## 2019-09-21 RX ADMIN — SODIUM CHLORIDE, PRESERVATIVE FREE 10 ML: 5 INJECTION INTRAVENOUS at 10:18

## 2019-09-21 RX ADMIN — PANTOPRAZOLE SODIUM 40 MG: 40 TABLET, DELAYED RELEASE ORAL at 06:45

## 2019-09-21 RX ADMIN — ACETAMINOPHEN 650 MG: 325 TABLET ORAL at 06:45

## 2019-09-21 RX ADMIN — GABAPENTIN 300 MG: 300 CAPSULE ORAL at 09:35

## 2019-09-21 RX ADMIN — AMLODIPINE BESYLATE 2.5 MG: 5 TABLET ORAL at 09:35

## 2019-09-21 RX ADMIN — ACETAMINOPHEN 650 MG: 325 TABLET ORAL at 01:17

## 2019-09-21 ASSESSMENT — PAIN DESCRIPTION - LOCATION
LOCATION: SHOULDER

## 2019-09-21 ASSESSMENT — PAIN DESCRIPTION - FREQUENCY
FREQUENCY: CONTINUOUS

## 2019-09-21 ASSESSMENT — PAIN SCALES - GENERAL
PAINLEVEL_OUTOF10: 0
PAINLEVEL_OUTOF10: 0
PAINLEVEL_OUTOF10: 2
PAINLEVEL_OUTOF10: 0
PAINLEVEL_OUTOF10: 2
PAINLEVEL_OUTOF10: 0
PAINLEVEL_OUTOF10: 0
PAINLEVEL_OUTOF10: 2

## 2019-09-21 ASSESSMENT — PAIN DESCRIPTION - DESCRIPTORS
DESCRIPTORS: ACHING;SORE

## 2019-09-21 ASSESSMENT — PAIN DESCRIPTION - ORIENTATION
ORIENTATION: LEFT

## 2019-09-21 ASSESSMENT — PAIN - FUNCTIONAL ASSESSMENT
PAIN_FUNCTIONAL_ASSESSMENT: ACTIVITIES ARE NOT PREVENTED

## 2019-09-21 ASSESSMENT — PAIN DESCRIPTION - ONSET
ONSET: ON-GOING

## 2019-09-21 ASSESSMENT — PAIN DESCRIPTION - PROGRESSION
CLINICAL_PROGRESSION: GRADUALLY WORSENING
CLINICAL_PROGRESSION: NOT CHANGED
CLINICAL_PROGRESSION: GRADUALLY WORSENING

## 2019-09-21 ASSESSMENT — PAIN DESCRIPTION - PAIN TYPE
TYPE: ACUTE PAIN

## 2019-09-21 NOTE — PROGRESS NOTES
Groin site dressing removed. Site cleansed and bandaid placed. Pressure dressing on left shoulder removed. Pt instructed to keep steri strips in place. DC instructions reviewed with pt. IV removed without complication.  Pt waiting for

## 2019-09-21 NOTE — DISCHARGE SUMMARY
Refills: 2    Associated Diagnoses: Mixed hyperlipidemia      escitalopram (LEXAPRO) 10 MG tablet TAKE 1 TABLET BY MOUTH DAILY  Qty: 30 tablet, Refills: 5    Associated Diagnoses: Generalized anxiety disorder      buPROPion (WELLBUTRIN XL) 150 MG extended release tablet TAKE ONE TABLET BY MOUTH ONE TIME DAILY  Qty: 90 tablet, Refills: 3    Associated Diagnoses: Generalized anxiety disorder      omeprazole (PRILOSEC) 20 MG capsule Take 20 mg by mouth daily. cetirizine (ZYRTEC) 10 MG tablet Take 10 mg by mouth daily as needed       gabapentin (NEURONTIN) 300 MG capsule TAKE ONE CAPSULE BY MOUTH THREE TIMES DAILY. Qty: 270 capsule, Refills: 1      diphenhydrAMINE (BENADRYL) 25 MG capsule Take 25 mg by mouth nightly as needed for Itching             Follow-up with cardiology nurse in 1 week for wound check. Follow-up with Dr. Librado Mclaughlin in 3 months.     Signed:    Bernadine Garcia MD, MS, [facility], 9/21/2019, 9:50 AM  Cardiac Electrophysiology  [address]  [address] [facility], [address] [facility]  [place] [address]  [phone]

## 2019-09-23 ENCOUNTER — TELEPHONE (OUTPATIENT)
Dept: INTERNAL MEDICINE CLINIC | Age: 74
End: 2019-09-23

## 2019-10-09 ENCOUNTER — OFFICE VISIT (OUTPATIENT)
Dept: CARDIOLOGY CLINIC | Age: 74
End: 2019-10-09

## 2019-10-09 ENCOUNTER — NURSE ONLY (OUTPATIENT)
Dept: CARDIOLOGY CLINIC | Age: 74
End: 2019-10-09
Payer: MEDICARE

## 2019-10-09 VITALS
WEIGHT: 236 LBS | SYSTOLIC BLOOD PRESSURE: 120 MMHG | HEIGHT: 65 IN | BODY MASS INDEX: 39.32 KG/M2 | HEART RATE: 82 BPM | OXYGEN SATURATION: 94 % | DIASTOLIC BLOOD PRESSURE: 70 MMHG

## 2019-10-09 DIAGNOSIS — Z95.0 PACEMAKER: ICD-10-CM

## 2019-10-09 DIAGNOSIS — I48.0 PAROXYSMAL ATRIAL FIBRILLATION (HCC): ICD-10-CM

## 2019-10-09 DIAGNOSIS — Z95.0 S/P PLACEMENT OF CARDIAC PACEMAKER: Primary | ICD-10-CM

## 2019-10-09 DIAGNOSIS — I44.2 COMPLETE HEART BLOCK (HCC): ICD-10-CM

## 2019-10-09 PROCEDURE — G8399 PT W/DXA RESULTS DOCUMENT: HCPCS | Performed by: INTERNAL MEDICINE

## 2019-10-09 PROCEDURE — 93280 PM DEVICE PROGR EVAL DUAL: CPT | Performed by: INTERNAL MEDICINE

## 2019-10-09 PROCEDURE — G8484 FLU IMMUNIZE NO ADMIN: HCPCS | Performed by: INTERNAL MEDICINE

## 2019-10-09 PROCEDURE — 1123F ACP DISCUSS/DSCN MKR DOCD: CPT | Performed by: INTERNAL MEDICINE

## 2019-10-09 PROCEDURE — G8417 CALC BMI ABV UP PARAM F/U: HCPCS | Performed by: INTERNAL MEDICINE

## 2019-10-09 PROCEDURE — 93000 ELECTROCARDIOGRAM COMPLETE: CPT | Performed by: INTERNAL MEDICINE

## 2019-10-09 PROCEDURE — 99024 POSTOP FOLLOW-UP VISIT: CPT | Performed by: INTERNAL MEDICINE

## 2019-10-09 PROCEDURE — 4040F PNEUMOC VAC/ADMIN/RCVD: CPT | Performed by: INTERNAL MEDICINE

## 2019-10-09 PROCEDURE — G8427 DOCREV CUR MEDS BY ELIG CLIN: HCPCS | Performed by: INTERNAL MEDICINE

## 2019-10-09 PROCEDURE — 3017F COLORECTAL CA SCREEN DOC REV: CPT | Performed by: INTERNAL MEDICINE

## 2019-10-09 PROCEDURE — 1090F PRES/ABSN URINE INCON ASSESS: CPT | Performed by: INTERNAL MEDICINE

## 2019-10-09 PROCEDURE — 1036F TOBACCO NON-USER: CPT | Performed by: INTERNAL MEDICINE

## 2019-10-21 ENCOUNTER — OFFICE VISIT (OUTPATIENT)
Dept: INTERNAL MEDICINE CLINIC | Age: 74
End: 2019-10-21
Payer: MEDICARE

## 2019-10-21 VITALS
SYSTOLIC BLOOD PRESSURE: 138 MMHG | BODY MASS INDEX: 38.94 KG/M2 | HEART RATE: 75 BPM | DIASTOLIC BLOOD PRESSURE: 80 MMHG | WEIGHT: 234 LBS

## 2019-10-21 DIAGNOSIS — N18.30 CHRONIC KIDNEY DISEASE (CKD), STAGE III (MODERATE) (HCC): ICD-10-CM

## 2019-10-21 DIAGNOSIS — I48.0 INTERMITTENT ATRIAL FIBRILLATION (HCC): Primary | ICD-10-CM

## 2019-10-21 DIAGNOSIS — E66.9 OBESITY, CLASS II, BMI 35-39.9: ICD-10-CM

## 2019-10-21 DIAGNOSIS — R73.02 IMPAIRED GLUCOSE TOLERANCE: ICD-10-CM

## 2019-10-21 DIAGNOSIS — F41.1 GENERALIZED ANXIETY DISORDER: ICD-10-CM

## 2019-10-21 DIAGNOSIS — E78.2 MIXED HYPERLIPIDEMIA: ICD-10-CM

## 2019-10-21 DIAGNOSIS — I10 ESSENTIAL HYPERTENSION: ICD-10-CM

## 2019-10-21 PROBLEM — T82.110A PACEMAKER LEAD FAILURE: Status: RESOLVED | Noted: 2019-09-20 | Resolved: 2019-10-21

## 2019-10-21 PROCEDURE — 99214 OFFICE O/P EST MOD 30 MIN: CPT | Performed by: FAMILY MEDICINE

## 2019-10-21 PROCEDURE — 3017F COLORECTAL CA SCREEN DOC REV: CPT | Performed by: FAMILY MEDICINE

## 2019-10-21 PROCEDURE — G8482 FLU IMMUNIZE ORDER/ADMIN: HCPCS | Performed by: FAMILY MEDICINE

## 2019-10-21 PROCEDURE — 1123F ACP DISCUSS/DSCN MKR DOCD: CPT | Performed by: FAMILY MEDICINE

## 2019-10-21 PROCEDURE — 4040F PNEUMOC VAC/ADMIN/RCVD: CPT | Performed by: FAMILY MEDICINE

## 2019-10-21 PROCEDURE — 1036F TOBACCO NON-USER: CPT | Performed by: FAMILY MEDICINE

## 2019-10-21 PROCEDURE — 1090F PRES/ABSN URINE INCON ASSESS: CPT | Performed by: FAMILY MEDICINE

## 2019-10-21 PROCEDURE — G8399 PT W/DXA RESULTS DOCUMENT: HCPCS | Performed by: FAMILY MEDICINE

## 2019-10-21 PROCEDURE — G8417 CALC BMI ABV UP PARAM F/U: HCPCS | Performed by: FAMILY MEDICINE

## 2019-10-21 PROCEDURE — G8427 DOCREV CUR MEDS BY ELIG CLIN: HCPCS | Performed by: FAMILY MEDICINE

## 2019-10-21 ASSESSMENT — ENCOUNTER SYMPTOMS
BLOOD IN STOOL: 0
SHORTNESS OF BREATH: 0
COUGH: 0
ANAL BLEEDING: 0
ABDOMINAL PAIN: 0
CHEST TIGHTNESS: 0
WHEEZING: 0

## 2019-10-31 DIAGNOSIS — I10 ESSENTIAL HYPERTENSION: ICD-10-CM

## 2019-10-31 RX ORDER — LOSARTAN POTASSIUM 50 MG/1
TABLET ORAL
Qty: 90 TABLET | Refills: 1 | Status: ON HOLD | OUTPATIENT
Start: 2019-10-31 | End: 2020-01-15 | Stop reason: HOSPADM

## 2019-11-10 DIAGNOSIS — E78.2 MIXED HYPERLIPIDEMIA: ICD-10-CM

## 2019-11-11 RX ORDER — SIMVASTATIN 20 MG
TABLET ORAL
Qty: 90 TABLET | Refills: 2 | Status: SHIPPED | OUTPATIENT
Start: 2019-11-11 | End: 2020-05-27

## 2019-11-15 DIAGNOSIS — R73.02 IMPAIRED GLUCOSE TOLERANCE: ICD-10-CM

## 2019-11-15 RX ORDER — METFORMIN HYDROCHLORIDE 500 MG/1
TABLET, EXTENDED RELEASE ORAL
Qty: 90 TABLET | Refills: 1 | Status: SHIPPED | OUTPATIENT
Start: 2019-11-15 | End: 2020-05-08

## 2019-11-22 DIAGNOSIS — F41.1 GENERALIZED ANXIETY DISORDER: ICD-10-CM

## 2019-11-22 RX ORDER — ESCITALOPRAM OXALATE 10 MG/1
TABLET ORAL
Qty: 90 TABLET | Refills: 1 | Status: SHIPPED | OUTPATIENT
Start: 2019-11-22 | End: 2020-05-27

## 2019-11-22 RX ORDER — BUPROPION HYDROCHLORIDE 150 MG/1
TABLET ORAL
Qty: 90 TABLET | Refills: 3 | Status: SHIPPED | OUTPATIENT
Start: 2019-11-22 | End: 2020-11-18

## 2019-12-03 ENCOUNTER — TELEPHONE (OUTPATIENT)
Dept: CARDIOLOGY CLINIC | Age: 74
End: 2019-12-03

## 2019-12-03 DIAGNOSIS — I48.0 PAROXYSMAL ATRIAL FIBRILLATION (HCC): Primary | ICD-10-CM

## 2019-12-04 RX ORDER — GABAPENTIN 300 MG/1
CAPSULE ORAL
Qty: 270 CAPSULE | Refills: 1 | Status: SHIPPED | OUTPATIENT
Start: 2019-12-04 | End: 2020-05-27

## 2020-01-07 DIAGNOSIS — I48.0 PAROXYSMAL ATRIAL FIBRILLATION (HCC): ICD-10-CM

## 2020-01-07 LAB
ABO/RH: NORMAL
ANION GAP SERPL CALCULATED.3IONS-SCNC: 14 MMOL/L (ref 3–16)
ANTIBODY SCREEN: NORMAL
BUN BLDV-MCNC: 16 MG/DL (ref 7–20)
CALCIUM SERPL-MCNC: 9.1 MG/DL (ref 8.3–10.6)
CHLORIDE BLD-SCNC: 100 MMOL/L (ref 99–110)
CO2: 26 MMOL/L (ref 21–32)
CREAT SERPL-MCNC: 1 MG/DL (ref 0.6–1.2)
GFR AFRICAN AMERICAN: >60
GFR NON-AFRICAN AMERICAN: 54
GLUCOSE BLD-MCNC: 108 MG/DL (ref 70–99)
HCT VFR BLD CALC: 47.2 % (ref 36–48)
HEMOGLOBIN: 15.4 G/DL (ref 12–16)
MCH RBC QN AUTO: 29.7 PG (ref 26–34)
MCHC RBC AUTO-ENTMCNC: 32.6 G/DL (ref 31–36)
MCV RBC AUTO: 90.9 FL (ref 80–100)
PDW BLD-RTO: 13.7 % (ref 12.4–15.4)
PLATELET # BLD: 255 K/UL (ref 135–450)
PMV BLD AUTO: 7.8 FL (ref 5–10.5)
POTASSIUM SERPL-SCNC: 4.6 MMOL/L (ref 3.5–5.1)
RBC # BLD: 5.19 M/UL (ref 4–5.2)
SODIUM BLD-SCNC: 140 MMOL/L (ref 136–145)
WBC # BLD: 8.3 K/UL (ref 4–11)

## 2020-01-10 ENCOUNTER — HOSPITAL ENCOUNTER (OUTPATIENT)
Dept: CT IMAGING | Age: 75
Discharge: HOME | DRG: 274 | End: 2020-01-10
Payer: MEDICARE

## 2020-01-10 PROCEDURE — 6360000004 HC RX CONTRAST MEDICATION: Performed by: FAMILY MEDICINE

## 2020-01-10 PROCEDURE — 75574 CT ANGIO HRT W/3D IMAGE: CPT

## 2020-01-10 RX ADMIN — IOPAMIDOL 75 ML: 755 INJECTION, SOLUTION INTRAVENOUS at 08:06

## 2020-01-13 ENCOUNTER — HOSPITAL ENCOUNTER (INPATIENT)
Dept: CARDIAC CATH/INVASIVE PROCEDURES | Age: 75
LOS: 2 days | Discharge: HOME | DRG: 274 | End: 2020-01-15
Attending: INTERNAL MEDICINE | Admitting: INTERNAL MEDICINE
Payer: MEDICARE

## 2020-01-13 ENCOUNTER — ANESTHESIA EVENT (OUTPATIENT)
Dept: CARDIAC CATH/INVASIVE PROCEDURES | Age: 75
DRG: 274 | End: 2020-01-13
Payer: MEDICARE

## 2020-01-13 ENCOUNTER — ANESTHESIA (OUTPATIENT)
Dept: CARDIAC CATH/INVASIVE PROCEDURES | Age: 75
DRG: 274 | End: 2020-01-13
Payer: MEDICARE

## 2020-01-13 VITALS
DIASTOLIC BLOOD PRESSURE: 54 MMHG | SYSTOLIC BLOOD PRESSURE: 94 MMHG | OXYGEN SATURATION: 96 % | TEMPERATURE: 96.3 F | RESPIRATION RATE: 6 BRPM

## 2020-01-13 PROBLEM — I48.91 ATRIAL FIBRILLATION (HCC): Status: ACTIVE | Noted: 2020-01-13

## 2020-01-13 PROBLEM — I48.0 PAROXYSMAL ATRIAL FIBRILLATION (HCC): Status: ACTIVE | Noted: 2020-01-13

## 2020-01-13 LAB
ABO/RH: NORMAL
ACTIVATED CLOTTING TIME: 261 SEC (ref 99–130)
ACTIVATED CLOTTING TIME: 301 SEC (ref 99–130)
ACTIVATED CLOTTING TIME: 351 SEC (ref 99–130)
ACTIVATED CLOTTING TIME: 357 SEC (ref 99–130)
ANTIBODY SCREEN: NORMAL
EKG ATRIAL RATE: 81 BPM
EKG DIAGNOSIS: NORMAL
EKG P AXIS: 47 DEGREES
EKG P-R INTERVAL: 174 MS
EKG Q-T INTERVAL: 526 MS
EKG QRS DURATION: 182 MS
EKG QTC CALCULATION (BAZETT): 611 MS
EKG R AXIS: -70 DEGREES
EKG T AXIS: 91 DEGREES
EKG VENTRICULAR RATE: 81 BPM

## 2020-01-13 PROCEDURE — 93005 ELECTROCARDIOGRAM TRACING: CPT | Performed by: INTERNAL MEDICINE

## 2020-01-13 PROCEDURE — 6370000000 HC RX 637 (ALT 250 FOR IP): Performed by: INTERNAL MEDICINE

## 2020-01-13 PROCEDURE — 86850 RBC ANTIBODY SCREEN: CPT

## 2020-01-13 PROCEDURE — 2580000003 HC RX 258

## 2020-01-13 PROCEDURE — 86900 BLOOD TYPING SEROLOGIC ABO: CPT

## 2020-01-13 PROCEDURE — 93623 PRGRMD STIMJ&PACG IV RX NFS: CPT | Performed by: INTERNAL MEDICINE

## 2020-01-13 PROCEDURE — 7100000000 HC PACU RECOVERY - FIRST 15 MIN

## 2020-01-13 PROCEDURE — 93662 INTRACARDIAC ECG (ICE): CPT | Performed by: INTERNAL MEDICINE

## 2020-01-13 PROCEDURE — 02K83ZZ MAP CONDUCTION MECHANISM, PERCUTANEOUS APPROACH: ICD-10-PCS | Performed by: INTERNAL MEDICINE

## 2020-01-13 PROCEDURE — 2720000010 HC SURG SUPPLY STERILE

## 2020-01-13 PROCEDURE — 6360000002 HC RX W HCPCS: Performed by: INTERNAL MEDICINE

## 2020-01-13 PROCEDURE — 93010 ELECTROCARDIOGRAM REPORT: CPT | Performed by: INTERNAL MEDICINE

## 2020-01-13 PROCEDURE — 86901 BLOOD TYPING SEROLOGIC RH(D): CPT

## 2020-01-13 PROCEDURE — 85347 COAGULATION TIME ACTIVATED: CPT

## 2020-01-13 PROCEDURE — 93655 ICAR CATH ABLTJ DSCRT ARRHYT: CPT | Performed by: FAMILY MEDICINE

## 2020-01-13 PROCEDURE — 4A0234Z MEASUREMENT OF CARDIAC ELECTRICAL ACTIVITY, PERCUTANEOUS APPROACH: ICD-10-PCS | Performed by: INTERNAL MEDICINE

## 2020-01-13 PROCEDURE — 2580000003 HC RX 258: Performed by: INTERNAL MEDICINE

## 2020-01-13 PROCEDURE — C1894 INTRO/SHEATH, NON-LASER: HCPCS

## 2020-01-13 PROCEDURE — 4A023FZ MEASUREMENT OF CARDIAC RHYTHM, PERCUTANEOUS APPROACH: ICD-10-PCS | Performed by: INTERNAL MEDICINE

## 2020-01-13 PROCEDURE — 6360000002 HC RX W HCPCS: Performed by: NURSE ANESTHETIST, CERTIFIED REGISTERED

## 2020-01-13 PROCEDURE — 7100000001 HC PACU RECOVERY - ADDTL 15 MIN

## 2020-01-13 PROCEDURE — 2500000003 HC RX 250 WO HCPCS: Performed by: NURSE ANESTHETIST, CERTIFIED REGISTERED

## 2020-01-13 PROCEDURE — 2100000000 HC CCU R&B

## 2020-01-13 PROCEDURE — C1759 CATH, INTRA ECHOCARDIOGRAPHY: HCPCS

## 2020-01-13 PROCEDURE — 2709999900 HC NON-CHARGEABLE SUPPLY

## 2020-01-13 PROCEDURE — 94761 N-INVAS EAR/PLS OXIMETRY MLT: CPT

## 2020-01-13 PROCEDURE — 93613 INTRACARDIAC EPHYS 3D MAPG: CPT | Performed by: FAMILY MEDICINE

## 2020-01-13 PROCEDURE — 02583ZZ DESTRUCTION OF CONDUCTION MECHANISM, PERCUTANEOUS APPROACH: ICD-10-PCS | Performed by: INTERNAL MEDICINE

## 2020-01-13 PROCEDURE — 93656 COMPRE EP EVAL ABLTJ ATR FIB: CPT | Performed by: INTERNAL MEDICINE

## 2020-01-13 PROCEDURE — C1730 CATH, EP, 19 OR FEW ELECT: HCPCS

## 2020-01-13 PROCEDURE — 2580000003 HC RX 258: Performed by: NURSE ANESTHETIST, CERTIFIED REGISTERED

## 2020-01-13 PROCEDURE — 2500000003 HC RX 250 WO HCPCS

## 2020-01-13 PROCEDURE — C1732 CATH, EP, DIAG/ABL, 3D/VECT: HCPCS

## 2020-01-13 PROCEDURE — 3700000000 HC ANESTHESIA ATTENDED CARE

## 2020-01-13 PROCEDURE — 3700000001 HC ADD 15 MINUTES (ANESTHESIA)

## 2020-01-13 PROCEDURE — 6360000002 HC RX W HCPCS

## 2020-01-13 PROCEDURE — 93656 COMPRE EP EVAL ABLTJ ATR FIB: CPT | Performed by: FAMILY MEDICINE

## 2020-01-13 PROCEDURE — 93662 INTRACARDIAC ECG (ICE): CPT | Performed by: FAMILY MEDICINE

## 2020-01-13 PROCEDURE — 93613 INTRACARDIAC EPHYS 3D MAPG: CPT | Performed by: INTERNAL MEDICINE

## 2020-01-13 RX ORDER — SODIUM CHLORIDE 0.9 % (FLUSH) 0.9 %
10 SYRINGE (ML) INJECTION PRN
Status: DISCONTINUED | OUTPATIENT
Start: 2020-01-13 | End: 2020-01-15 | Stop reason: HOSPADM

## 2020-01-13 RX ORDER — AMLODIPINE BESYLATE 5 MG/1
2.5 TABLET ORAL DAILY
Status: DISCONTINUED | OUTPATIENT
Start: 2020-01-14 | End: 2020-01-15 | Stop reason: HOSPADM

## 2020-01-13 RX ORDER — PANTOPRAZOLE SODIUM 40 MG/1
40 TABLET, DELAYED RELEASE ORAL
Status: DISCONTINUED | OUTPATIENT
Start: 2020-01-14 | End: 2020-01-15 | Stop reason: HOSPADM

## 2020-01-13 RX ORDER — BUPROPION HYDROCHLORIDE 150 MG/1
150 TABLET ORAL DAILY
Status: DISCONTINUED | OUTPATIENT
Start: 2020-01-14 | End: 2020-01-15 | Stop reason: HOSPADM

## 2020-01-13 RX ORDER — PROPOFOL 10 MG/ML
INJECTION, EMULSION INTRAVENOUS PRN
Status: DISCONTINUED | OUTPATIENT
Start: 2020-01-13 | End: 2020-01-13 | Stop reason: SDUPTHER

## 2020-01-13 RX ORDER — FLECAINIDE ACETATE 100 MG/1
50 TABLET ORAL EVERY 12 HOURS SCHEDULED
Status: DISCONTINUED | OUTPATIENT
Start: 2020-01-13 | End: 2020-01-15

## 2020-01-13 RX ORDER — FENTANYL CITRATE 50 UG/ML
INJECTION, SOLUTION INTRAMUSCULAR; INTRAVENOUS PRN
Status: DISCONTINUED | OUTPATIENT
Start: 2020-01-13 | End: 2020-01-13 | Stop reason: SDUPTHER

## 2020-01-13 RX ORDER — ACETAMINOPHEN 325 MG/1
650 TABLET ORAL EVERY 4 HOURS PRN
Status: DISCONTINUED | OUTPATIENT
Start: 2020-01-13 | End: 2020-01-15 | Stop reason: HOSPADM

## 2020-01-13 RX ORDER — ROCURONIUM BROMIDE 10 MG/ML
INJECTION, SOLUTION INTRAVENOUS PRN
Status: DISCONTINUED | OUTPATIENT
Start: 2020-01-13 | End: 2020-01-13 | Stop reason: SDUPTHER

## 2020-01-13 RX ORDER — SODIUM CHLORIDE 0.9 % (FLUSH) 0.9 %
10 SYRINGE (ML) INJECTION EVERY 12 HOURS SCHEDULED
Status: DISCONTINUED | OUTPATIENT
Start: 2020-01-13 | End: 2020-01-15 | Stop reason: HOSPADM

## 2020-01-13 RX ORDER — HEPARIN SODIUM 10000 [USP'U]/100ML
INJECTION, SOLUTION INTRAVENOUS CONTINUOUS PRN
Status: DISCONTINUED | OUTPATIENT
Start: 2020-01-13 | End: 2020-01-13 | Stop reason: SDUPTHER

## 2020-01-13 RX ORDER — MIDAZOLAM HYDROCHLORIDE 1 MG/ML
INJECTION INTRAMUSCULAR; INTRAVENOUS PRN
Status: DISCONTINUED | OUTPATIENT
Start: 2020-01-13 | End: 2020-01-13 | Stop reason: SDUPTHER

## 2020-01-13 RX ORDER — EPHEDRINE SULFATE/0.9% NACL/PF 50 MG/5 ML
SYRINGE (ML) INTRAVENOUS PRN
Status: DISCONTINUED | OUTPATIENT
Start: 2020-01-13 | End: 2020-01-13 | Stop reason: SDUPTHER

## 2020-01-13 RX ORDER — SIMVASTATIN 20 MG
20 TABLET ORAL NIGHTLY
Status: DISCONTINUED | OUTPATIENT
Start: 2020-01-13 | End: 2020-01-15 | Stop reason: HOSPADM

## 2020-01-13 RX ORDER — METFORMIN HYDROCHLORIDE 500 MG/1
500 TABLET, EXTENDED RELEASE ORAL
Status: DISCONTINUED | OUTPATIENT
Start: 2020-01-14 | End: 2020-01-15 | Stop reason: HOSPADM

## 2020-01-13 RX ORDER — ONDANSETRON 2 MG/ML
4 INJECTION INTRAMUSCULAR; INTRAVENOUS EVERY 6 HOURS PRN
Status: DISCONTINUED | OUTPATIENT
Start: 2020-01-13 | End: 2020-01-15 | Stop reason: HOSPADM

## 2020-01-13 RX ORDER — PROTAMINE SULFATE 10 MG/ML
INJECTION, SOLUTION INTRAVENOUS PRN
Status: DISCONTINUED | OUTPATIENT
Start: 2020-01-13 | End: 2020-01-13 | Stop reason: SDUPTHER

## 2020-01-13 RX ORDER — SODIUM CHLORIDE 9 MG/ML
INJECTION, SOLUTION INTRAVENOUS CONTINUOUS
Status: DISCONTINUED | OUTPATIENT
Start: 2020-01-13 | End: 2020-01-13

## 2020-01-13 RX ORDER — SODIUM CHLORIDE 9 MG/ML
INJECTION, SOLUTION INTRAVENOUS CONTINUOUS PRN
Status: DISCONTINUED | OUTPATIENT
Start: 2020-01-13 | End: 2020-01-13 | Stop reason: SDUPTHER

## 2020-01-13 RX ORDER — HEPARIN SODIUM 1000 [USP'U]/ML
INJECTION, SOLUTION INTRAVENOUS; SUBCUTANEOUS PRN
Status: DISCONTINUED | OUTPATIENT
Start: 2020-01-13 | End: 2020-01-13 | Stop reason: SDUPTHER

## 2020-01-13 RX ORDER — SUCCINYLCHOLINE/SOD CL,ISO/PF 200MG/10ML
SYRINGE (ML) INTRAVENOUS PRN
Status: DISCONTINUED | OUTPATIENT
Start: 2020-01-13 | End: 2020-01-13 | Stop reason: SDUPTHER

## 2020-01-13 RX ORDER — GABAPENTIN 300 MG/1
300 CAPSULE ORAL 3 TIMES DAILY
Status: DISCONTINUED | OUTPATIENT
Start: 2020-01-13 | End: 2020-01-15 | Stop reason: HOSPADM

## 2020-01-13 RX ORDER — ESCITALOPRAM OXALATE 10 MG/1
10 TABLET ORAL DAILY
Status: DISCONTINUED | OUTPATIENT
Start: 2020-01-14 | End: 2020-01-15 | Stop reason: HOSPADM

## 2020-01-13 RX ORDER — SODIUM CHLORIDE 0.9 % (FLUSH) 0.9 %
10 SYRINGE (ML) INJECTION EVERY 12 HOURS SCHEDULED
Status: DISCONTINUED | OUTPATIENT
Start: 2020-01-13 | End: 2020-01-13 | Stop reason: SDUPTHER

## 2020-01-13 RX ORDER — 0.9 % SODIUM CHLORIDE 0.9 %
1000 INTRAVENOUS SOLUTION INTRAVENOUS
Status: ACTIVE | OUTPATIENT
Start: 2020-01-13 | End: 2020-01-13

## 2020-01-13 RX ORDER — LOSARTAN POTASSIUM 50 MG/1
50 TABLET ORAL DAILY
Status: DISCONTINUED | OUTPATIENT
Start: 2020-01-14 | End: 2020-01-15 | Stop reason: HOSPADM

## 2020-01-13 RX ORDER — DIPHENHYDRAMINE HCL 25 MG
25 TABLET ORAL NIGHTLY PRN
Status: DISCONTINUED | OUTPATIENT
Start: 2020-01-13 | End: 2020-01-15 | Stop reason: HOSPADM

## 2020-01-13 RX ORDER — CETIRIZINE HYDROCHLORIDE 10 MG/1
10 TABLET ORAL DAILY PRN
Status: DISCONTINUED | OUTPATIENT
Start: 2020-01-13 | End: 2020-01-15 | Stop reason: HOSPADM

## 2020-01-13 RX ORDER — FUROSEMIDE 10 MG/ML
INJECTION INTRAMUSCULAR; INTRAVENOUS PRN
Status: DISCONTINUED | OUTPATIENT
Start: 2020-01-13 | End: 2020-01-13 | Stop reason: SDUPTHER

## 2020-01-13 RX ORDER — PROTAMINE SULFATE 10 MG/ML
30 INJECTION, SOLUTION INTRAVENOUS
Status: ACTIVE | OUTPATIENT
Start: 2020-01-13 | End: 2020-01-13

## 2020-01-13 RX ORDER — ADENOSINE 3 MG/ML
INJECTION, SOLUTION INTRAVENOUS PRN
Status: DISCONTINUED | OUTPATIENT
Start: 2020-01-13 | End: 2020-01-13 | Stop reason: SDUPTHER

## 2020-01-13 RX ADMIN — HEPARIN SODIUM AND DEXTROSE 10000 UNITS/HR: 10000; 5 INJECTION INTRAVENOUS at 09:34

## 2020-01-13 RX ADMIN — SUGAMMADEX 200 MG: 100 INJECTION, SOLUTION INTRAVENOUS at 10:42

## 2020-01-13 RX ADMIN — GABAPENTIN 300 MG: 300 CAPSULE ORAL at 20:30

## 2020-01-13 RX ADMIN — SODIUM CHLORIDE: 9 INJECTION, SOLUTION INTRAVENOUS at 07:13

## 2020-01-13 RX ADMIN — ROCURONIUM BROMIDE 40 MG: 10 INJECTION INTRAVENOUS at 08:03

## 2020-01-13 RX ADMIN — FENTANYL CITRATE 100 MCG: 50 INJECTION INTRAMUSCULAR; INTRAVENOUS at 07:58

## 2020-01-13 RX ADMIN — Medication 10 MG: at 08:23

## 2020-01-13 RX ADMIN — METOPROLOL TARTRATE 12.5 MG: 25 TABLET ORAL at 20:30

## 2020-01-13 RX ADMIN — ONDANSETRON 4 MG: 2 INJECTION INTRAMUSCULAR; INTRAVENOUS at 20:30

## 2020-01-13 RX ADMIN — APIXABAN 5 MG: 5 TABLET, FILM COATED ORAL at 13:40

## 2020-01-13 RX ADMIN — ACETAMINOPHEN 650 MG: 325 TABLET ORAL at 13:39

## 2020-01-13 RX ADMIN — ACETAMINOPHEN 650 MG: 325 TABLET ORAL at 20:30

## 2020-01-13 RX ADMIN — ROCURONIUM BROMIDE 10 MG: 10 INJECTION INTRAVENOUS at 09:11

## 2020-01-13 RX ADMIN — APIXABAN 5 MG: 5 TABLET, FILM COATED ORAL at 20:30

## 2020-01-13 RX ADMIN — PROPOFOL 150 MG: 10 INJECTION, EMULSION INTRAVENOUS at 07:58

## 2020-01-13 RX ADMIN — FLECAINIDE ACETATE 50 MG: 100 TABLET ORAL at 20:30

## 2020-01-13 RX ADMIN — MIDAZOLAM 2 MG: 1 INJECTION INTRAMUSCULAR; INTRAVENOUS at 07:58

## 2020-01-13 RX ADMIN — FUROSEMIDE 40 MG: 10 INJECTION, SOLUTION INTRAVENOUS at 10:21

## 2020-01-13 RX ADMIN — Medication 100 MG: at 07:58

## 2020-01-13 RX ADMIN — PROTAMINE SULFATE 50 MG: 10 INJECTION, SOLUTION INTRAVENOUS at 10:38

## 2020-01-13 RX ADMIN — SODIUM CHLORIDE, PRESERVATIVE FREE 10 ML: 5 INJECTION INTRAVENOUS at 20:31

## 2020-01-13 RX ADMIN — ROCURONIUM BROMIDE 10 MG: 10 INJECTION INTRAVENOUS at 09:36

## 2020-01-13 RX ADMIN — HEPARIN SODIUM 5000 UNITS: 1000 INJECTION, SOLUTION INTRAVENOUS; SUBCUTANEOUS at 09:14

## 2020-01-13 RX ADMIN — SIMVASTATIN 20 MG: 20 TABLET, FILM COATED ORAL at 20:30

## 2020-01-13 RX ADMIN — ROCURONIUM BROMIDE 10 MG: 10 INJECTION INTRAVENOUS at 08:36

## 2020-01-13 RX ADMIN — ADENOSINE 18 MG: 3 SOLUTION INTRAVENOUS at 10:24

## 2020-01-13 RX ADMIN — HEPARIN SODIUM 2000 UNITS: 1000 INJECTION, SOLUTION INTRAVENOUS; SUBCUTANEOUS at 09:32

## 2020-01-13 RX ADMIN — Medication 10 MG: at 08:12

## 2020-01-13 RX ADMIN — HEPARIN SODIUM 5000 UNITS: 1000 INJECTION, SOLUTION INTRAVENOUS; SUBCUTANEOUS at 08:57

## 2020-01-13 ASSESSMENT — PULMONARY FUNCTION TESTS
PIF_VALUE: 21
PIF_VALUE: 22
PIF_VALUE: 21
PIF_VALUE: 21
PIF_VALUE: 22
PIF_VALUE: 23
PIF_VALUE: 22
PIF_VALUE: 22
PIF_VALUE: 13
PIF_VALUE: 22
PIF_VALUE: 8
PIF_VALUE: 19
PIF_VALUE: 22
PIF_VALUE: 17
PIF_VALUE: 21
PIF_VALUE: 22
PIF_VALUE: 1
PIF_VALUE: 22
PIF_VALUE: 6
PIF_VALUE: 22
PIF_VALUE: 22
PIF_VALUE: 21
PIF_VALUE: 22
PIF_VALUE: 21
PIF_VALUE: 22
PIF_VALUE: 23
PIF_VALUE: 22
PIF_VALUE: 22
PIF_VALUE: 28
PIF_VALUE: 21
PIF_VALUE: 21
PIF_VALUE: 22
PIF_VALUE: 21
PIF_VALUE: 21
PIF_VALUE: 22
PIF_VALUE: 22
PIF_VALUE: 21
PIF_VALUE: 21
PIF_VALUE: 22
PIF_VALUE: 21
PIF_VALUE: 22
PIF_VALUE: 21
PIF_VALUE: 21
PIF_VALUE: 22
PIF_VALUE: 23
PIF_VALUE: 22
PIF_VALUE: 22
PIF_VALUE: 23
PIF_VALUE: 21
PIF_VALUE: 22
PIF_VALUE: 22
PIF_VALUE: 21
PIF_VALUE: 22
PIF_VALUE: 1
PIF_VALUE: 21
PIF_VALUE: 22
PIF_VALUE: 21
PIF_VALUE: 22
PIF_VALUE: 1
PIF_VALUE: 22
PIF_VALUE: 21
PIF_VALUE: 44
PIF_VALUE: 22
PIF_VALUE: 21
PIF_VALUE: 23
PIF_VALUE: 21
PIF_VALUE: 1
PIF_VALUE: 22
PIF_VALUE: 21
PIF_VALUE: 21
PIF_VALUE: 22
PIF_VALUE: 21
PIF_VALUE: 22
PIF_VALUE: 21
PIF_VALUE: 22
PIF_VALUE: 21
PIF_VALUE: 22
PIF_VALUE: 17
PIF_VALUE: 21
PIF_VALUE: 23
PIF_VALUE: 21
PIF_VALUE: 22
PIF_VALUE: 22
PIF_VALUE: 21
PIF_VALUE: 22
PIF_VALUE: 22
PIF_VALUE: 21
PIF_VALUE: 22
PIF_VALUE: 21
PIF_VALUE: 22
PIF_VALUE: 21
PIF_VALUE: 26
PIF_VALUE: 21
PIF_VALUE: 21
PIF_VALUE: 22
PIF_VALUE: 21
PIF_VALUE: 22
PIF_VALUE: 22
PIF_VALUE: 21
PIF_VALUE: 22
PIF_VALUE: 22
PIF_VALUE: 21
PIF_VALUE: 21
PIF_VALUE: 22
PIF_VALUE: 21
PIF_VALUE: 22
PIF_VALUE: 18
PIF_VALUE: 21
PIF_VALUE: 22
PIF_VALUE: 4
PIF_VALUE: 23
PIF_VALUE: 21
PIF_VALUE: 21
PIF_VALUE: 18
PIF_VALUE: 21
PIF_VALUE: 22
PIF_VALUE: 21
PIF_VALUE: 22
PIF_VALUE: 22

## 2020-01-13 ASSESSMENT — PAIN - FUNCTIONAL ASSESSMENT: PAIN_FUNCTIONAL_ASSESSMENT: ACTIVITIES ARE NOT PREVENTED

## 2020-01-13 ASSESSMENT — PAIN SCALES - GENERAL
PAINLEVEL_OUTOF10: 0
PAINLEVEL_OUTOF10: 6
PAINLEVEL_OUTOF10: 6
PAINLEVEL_OUTOF10: 0
PAINLEVEL_OUTOF10: 0

## 2020-01-13 ASSESSMENT — PAIN DESCRIPTION - LOCATION: LOCATION: CHEST

## 2020-01-13 ASSESSMENT — PAIN DESCRIPTION - DESCRIPTORS: DESCRIPTORS: PRESSURE

## 2020-01-13 ASSESSMENT — PAIN DESCRIPTION - PROGRESSION: CLINICAL_PROGRESSION: GRADUALLY WORSENING

## 2020-01-13 ASSESSMENT — PAIN DESCRIPTION - PAIN TYPE: TYPE: ACUTE PAIN

## 2020-01-13 ASSESSMENT — PAIN DESCRIPTION - FREQUENCY: FREQUENCY: CONTINUOUS

## 2020-01-13 ASSESSMENT — PAIN DESCRIPTION - ORIENTATION: ORIENTATION: MID

## 2020-01-13 ASSESSMENT — PAIN DESCRIPTION - ONSET: ONSET: ON-GOING

## 2020-01-13 NOTE — H&P
[address]   Cardiac Electrophysiology Consultation   Date: 1/13/2020  Reason for Consultation: Complete heart block, PPM in situ, new onset afib  Consult Requesting Physician: Yunior Valentin MD     Chief Complaint:        Chief Complaint   Patient presents with    Atrial Fibrillation       no sob, no swelling, no cardiac complaints         HPI: Danica Mackey is a 74 y.o. with a past medical history significant for diabetes, TAZ on CPAP, CKD stage III , HTN , anxiety. She presented to [facility] ED on 4/30/19 as instructed by her PCP when she was found to be bradycardiac with HR in the 30's during office vist. She reported symptoms of lightheadedness tunnel vision and feeling like she was going to pass out. Upon arrival to ED EKG showed third degree AV block, rate 36, prolonged QRS and RSR pattern which was seen previously on EKG from 5/15/2015, no ST elevation or depression, normal T waves. She subsequently underwent implantation of Medtronic dual chamber PPM on 4/30/19. Device integration on She then underwent revision of her RV lead on 9/20/19 to seat the lead in a better position with more slack to increase battery life of PPM.     Interval History: Today, she presents to office for follow up for device management and to izabela discuss treatment options for her afib. She is compliant with his medications and tolerating them well.  She denies chest pain/pressure, tightness, edema, shortness of breath, heart racing, palpitations, lightheadedness, dizziness, syncope, presyncope,  PND or orthopnea.      Past Medical History        Past Medical History:   Diagnosis Date    Abnormal EKG       RBBB < 2008    Abnormal glucose tolerance test      Cataract      Chronic kidney disease (CKD), stage III (moderate) (HCC)      Elbow fracture 6/5/2008    Generalized anxiety disorder      GERD (gastroesophageal reflux disease)      Herpes zoster 8/2015    Hypertension      Lipoma of skin      Mixed hyperlipidemia      Myalgia and myositis      Obstructive sleep apnea      Osteoarthritis      Periodic limb movement disorder      Psoriasis      Seasonal allergic rhinitis       pollens    Tubular adenoma of colon 9/13            Past Surgical History         Past Surgical History:   Procedure Laterality Date    CATARACT REMOVAL   2011     Eric Gomez MD    CHOLECYSTECTOMY, LAPAROSCOPIC   6/11/15     Dr. Kim Ramsey   6/08     ORIF right;  both fractured    PACEMAKER PLACEMENT   05/08/2019    SOLIS AND BSO                Allergies: Allergies   Allergen Reactions    Pollen Extract      Tape Patrisha Star Tape] Itching         Medication:   Home Medications   Prior to Admission medications    Medication Sig Start Date End Date Taking? Authorizing Provider   amLODIPine (NORVASC) 2.5 MG tablet TAKE 1 TABLET BY MOUTH EVERY DAY 10/8/19   Yes Jeannie Rodriges MD   apixaban (ELIQUIS) 5 MG TABS tablet Take 1 tablet by mouth 2 times daily 9/25/19   Yes Haidee Boast, MD   losartan (COZAAR) 50 MG tablet Take 1 tablet by mouth daily 7/15/19   Yes Jeannie Rodriges MD   metFORMIN (GLUCOPHAGE-XR) 500 MG extended release tablet TAKE 1 TABLET DAILY: END OF BREAKFAST OR AT BEDTIME. 5/3/19   Yes Jeannie Rodriges MD   simvastatin (ZOCOR) 20 MG tablet TAKE 1 TABLET BY MOUTH NIGHTLY 1/31/19   Yes Jeannie Rodriges MD   escitalopram (LEXAPRO) 10 MG tablet TAKE 1 TABLET BY MOUTH DAILY 11/2/18   Yes Jeannie Rodriges MD   buPROPion (WELLBUTRIN XL) 150 MG extended release tablet TAKE ONE TABLET BY MOUTH ONE TIME DAILY 10/3/18   Yes Jeannie Rodriges MD   diphenhydrAMINE (BENADRYL) 25 MG capsule Take 25 mg by mouth nightly as needed for Itching     Yes Historical Provider, MD   omeprazole (PRILOSEC) 20 MG capsule Take 20 mg by mouth daily.       Yes Historical Provider, MD   gabapentin (NEURONTIN) 300 MG capsule TAKE ONE CAPSULE BY MOUTH THREE TIMES DAILY.  5/28/19 8/26/19   Jeannie Rodriges MD is a potentially curative therapy with very reasonable success rate after a first time procedure and with improving success rates with subsequent procedures.      -The risks, benefits and alternatives of the atrial fibrillation ablation procedure were discussed with the patient. The risks including, but not limited to, bleeding, infection, radiation exposure, injury to vascular, cardiac and surrounding structures (including pneumothorax), stroke, cardiac perforation, tamponade, need for emergent open heart surgery, need for pacemaker implantation, injury to the phrenic nerve, injury to the esophagus, myocardial infarction and death were discussed in detail.      -The patient would like some time to deliberate further regarding procedure. If she wishes to proceed, he will contact our nurse, Dianelys Hankins at which time we will schedule for a radiofrequency ablation with Carto Navigation system with a SANJU procedure immediately prior to this ablation. A cardiac CTA will be ordered for pulmonary vein mapping prior to this procedure. We will hold Eliquis for 12 hours prior to procedure. We will order BMP, CBC, PT/INR, and Type & Screen prior to the procedure.      Thank you for allowing me to participate in the care of [address] [place] [address]. All questions and concerns were addressed to the patient/family. Alternatives to my treatment were discussed.      Follow up 3 months after procedure if she decides to proceed, or in one year. Continue remote monitoring from home.     I have reviewed the history and physical and examined the patient and find no relevant changes.  I have reviewed with the patient and/or family the risks, benefits, and alternatives to the procedure.     Dom Mclain MD, [address], [facility], [address] [place] [address] Drive, [address] [place] [address]  [place] [address]  [phone]

## 2020-01-13 NOTE — ANESTHESIA PRE PROCEDURE
[facility] Department of Anesthesiology  Pre-Anesthesia Evaluation/Consultation       Name:  Collin Heard  : 1945  Age:  66 y.o. MRN:  [id number]  Date: 2020           Surgeon: * Surgery not found *    Procedure:       Allergies   Allergen Reactions    Pollen Extract     Tape Patrisha Star Tape] Itching     Patient Active Problem List   Diagnosis    Osteoarthritis    GERD (gastroesophageal reflux disease)    Mixed hyperlipidemia    TAZ on CPAP    Impaired glucose tolerance    Hypertension    Generalized anxiety disorder    Lipoma of skin    Myalgia    Chronic kidney disease (CKD), stage III (moderate) (HCC)    Periodic limb movement disorder    Obesity, Class II, BMI 35-39.9    Lipoma    Ganglion of wrist    Hyperuricemia    Pacemaker    Complete heart block (HCC)    Intermittent atrial fibrillation ([address]     Past Medical History:   Diagnosis Date    Abnormal EKG     RBBB < 2008    Abnormal glucose tolerance test     Cataract     Chronic kidney disease (CKD), stage III (moderate) (HCC)     Elbow fracture 2008    Generalized anxiety disorder     GERD (gastroesophageal reflux disease)     Herpes zoster 2015    Hypertension     Intermittent atrial fibrillation ([address] 10/21/2019    Lipoma of skin     Mixed hyperlipidemia     Myalgia and myositis     Obstructive sleep apnea     Osteoarthritis     Pacemaker lead failure 2019    Periodic limb movement disorder     Psoriasis     Seasonal allergic rhinitis     pollens    Tubular adenoma of colon      Past Surgical History:   Procedure Laterality Date    CATARACT REMOVAL      Eric Gomez MD   [facility], LAPAROSCOPIC  6/11/15    Dr. Kim Ramsey      ORIF right;  both fractured    PACEMAKER PLACEMENT  2019    SOLIS AND BSO       Social History     Tobacco Use    Smoking status: Never Smoker    Smokeless tobacco: Never Used (NORVASC) 2.5 MG tablet TAKE 1 TABLET BY MOUTH EVERY DAY 90 tablet 5    apixaban (ELIQUIS) 5 MG TABS tablet Take 1 tablet by mouth 2 times daily 60 tablet 5    diphenhydrAMINE (BENADRYL) 25 MG capsule Take 25 mg by mouth nightly as needed for Itching      omeprazole (PRILOSEC) 20 MG capsule Take 20 mg by mouth daily.  cetirizine (ZYRTEC) 10 MG tablet Take 10 mg by mouth daily as needed        Current Facility-Administered Medications   Medication Dose Route Frequency Provider Last Rate Last Dose    0.9 % sodium chloride infusion   Intravenous Continuous Alaniz MD Maribel        sodium chloride flush 0.9 % injection 10 mL  10 mL Intravenous 2 times per day Corbin López MD        sodium chloride flush 0.9 % injection 10 mL  10 mL Intravenous PRN Alaniz MD Maribel         Vital Signs (Current)   There were no vitals filed for this visit. BP Readings from Last 3 Encounters:   10/21/19 138/80   10/09/19 120/70   09/21/19 118/80     Vital Signs Statistics (for past 48 hrs)     No data recorded  BP Readings from Last 3 Encounters:   10/21/19 138/80   10/09/19 120/70   09/21/19 118/80       BMI  There is no height or weight on file to calculate BMI. Estimated body mass index is 38.94 kg/m² as calculated from the following:    Height as of 10/9/19: 5' 5\" (1.651 m). Weight as of 10/21/19: 234 lb (106.1 kg).     CBC   Lab Results   Component Value Date    WBC 8.3 01/07/2020    RBC 5.19 01/07/2020    HGB 15.4 01/07/2020    HCT 47.2 01/07/2020    MCV 90.9 01/07/2020    RDW 13.7 01/07/2020     01/07/2020     CMP    Lab Results   Component Value Date     01/07/2020    K 4.6 01/07/2020    K 4.0 05/01/2019     01/07/2020    CO2 26 01/07/2020    BUN 16 01/07/2020    CREATININE 1.0 01/07/2020    GFRAA >60 01/07/2020    GFRAA >60 01/28/2013    AGRATIO 1.2 04/30/2019    LABGLOM 54 01/07/2020    GLUCOSE 108 01/07/2020    PROT 6.9 04/30/2019    PROT 6.7 01/28/2013

## 2020-01-13 NOTE — ANESTHESIA POSTPROCEDURE EVALUATION
Department of Anesthesiology  Postprocedure Note    Patient: Avtar Arce  MRN: [id number]  YOB: 1945  Date of evaluation: 1/13/2020  Time:  2:14 PM     Procedure Summary     Date:  01/13/20 Room / Location:  [facility] Cath Lab    Anesthesia Start:  6880 Anesthesia Stop:  1103    Procedure:  ECHOCARDIOGRAM TRANSESOPHAGEAL Diagnosis:       Paroxysmal atrial fibrillation (HCC)      Paroxysmal atrial fibrillation (HCC)      Atrial fibrillation (HCC)      Paroxysmal atrial fibrillation ([address]      (prior to afib ablation)    Scheduled Providers:   Responsible Provider:  Pérez Espino MD    Anesthesia Type:  general ASA Status:  3          Anesthesia Type: general    Jocelyn Phase I:      Jocelyn Phase II:      Last vitals: Reviewed and per EMR flowsheets.        Anesthesia Post Evaluation    Patient location during evaluation: PACU  Patient participation: complete - patient participated  Level of consciousness: awake and alert  Pain score: 0  Airway patency: patent  Nausea & Vomiting: no nausea and no vomiting  Complications: no  Cardiovascular status: blood pressure returned to baseline  Respiratory status: acceptable  Hydration status: euvolemic

## 2020-01-13 NOTE — PROCEDURES
results:     Sinus cycle length was 931 msec  AL interval was 114 msec  QRS duration was 168 msec (v-pacing)  QT interval was 539 msec (v-pacing)  AH interval was 163 msec  HV interval was not able to be determined because of ventricular pacing  Pacing from left atrium, 1:1 conduction over AV node with (AV wenckebach) was 440  msec  Pacing from left atrium, AV akua ERP was 600/500 msec   Pacing from LV apex, 1:1 retrograde conduction over AV node (VA wenckebach) was 390 msec  Pacing from LV apex, showed VAERP of 600/420 msec. Then both the ablation, Pentarray, and CS catheters were removed from the body, and all 3 sheaths were removed from the right femoral vein with a figure-of-eight suture that was placed to provide hemostasis while awaiting the downtrending of the ACT. Protamine 50mg IV x 1 was administered to partially reverse the IV heparin that was administered during the atrial fibrillation ablation procedure. Under intracardiac ultrasound guidance, we evaluated for pericardial effusion, and there was no evidence of such. Specimen collected: none    Estimated blood loss: < 50 cc    The patient tolerated the procedure well and there were no complications. Patient was extubated and transferred to the floor in stable condition. Conclusion:     - Pre- and post-procedure diagnoses were paroxysmal atrial fibrillation and paroxysmal left atrial flutter with cycle length 243ms with a straight up-and-down CS atrial activation pattern   - Pulmonary vein isolations using wide area circumferential radiofrequency ablation     Plan:   The patient will be admitted overnight to CVU. The patient will receive usual post ablation care. If there are no complications, the patient will be discharged from the hospital tomorrow with a new prescription for Flecainide 50mg BID, a new prescription for Metoprolol 12.5mg BID, and pre-admission Eliquis 5mg BID.  The patient will follow-up with Dr. Tomi Cardozo in 3 month's

## 2020-01-14 LAB
EKG ATRIAL RATE: 99 BPM
EKG DIAGNOSIS: NORMAL
EKG P AXIS: 27 DEGREES
EKG P-R INTERVAL: 170 MS
EKG Q-T INTERVAL: 404 MS
EKG QRS DURATION: 168 MS
EKG QTC CALCULATION (BAZETT): 518 MS
EKG R AXIS: -68 DEGREES
EKG T AXIS: 95 DEGREES
EKG VENTRICULAR RATE: 99 BPM

## 2020-01-14 PROCEDURE — 99232 SBSQ HOSP IP/OBS MODERATE 35: CPT | Performed by: NURSE PRACTITIONER

## 2020-01-14 PROCEDURE — 93325 DOPPLER ECHO COLOR FLOW MAPG: CPT

## 2020-01-14 PROCEDURE — 93306 TTE W/DOPPLER COMPLETE: CPT

## 2020-01-14 PROCEDURE — 2580000003 HC RX 258: Performed by: INTERNAL MEDICINE

## 2020-01-14 PROCEDURE — 93308 TTE F-UP OR LMTD: CPT

## 2020-01-14 PROCEDURE — 2100000000 HC CCU R&B

## 2020-01-14 PROCEDURE — 6370000000 HC RX 637 (ALT 250 FOR IP): Performed by: INTERNAL MEDICINE

## 2020-01-14 PROCEDURE — 94761 N-INVAS EAR/PLS OXIMETRY MLT: CPT

## 2020-01-14 PROCEDURE — 93321 DOPPLER ECHO F-UP/LMTD STD: CPT

## 2020-01-14 PROCEDURE — 93010 ELECTROCARDIOGRAM REPORT: CPT | Performed by: INTERNAL MEDICINE

## 2020-01-14 RX ADMIN — METOPROLOL TARTRATE 12.5 MG: 25 TABLET ORAL at 09:16

## 2020-01-14 RX ADMIN — ESCITALOPRAM OXALATE 10 MG: 10 TABLET ORAL at 09:16

## 2020-01-14 RX ADMIN — GABAPENTIN 300 MG: 300 CAPSULE ORAL at 20:18

## 2020-01-14 RX ADMIN — GABAPENTIN 300 MG: 300 CAPSULE ORAL at 09:17

## 2020-01-14 RX ADMIN — APIXABAN 5 MG: 5 TABLET, FILM COATED ORAL at 09:17

## 2020-01-14 RX ADMIN — SODIUM CHLORIDE, PRESERVATIVE FREE 10 ML: 5 INJECTION INTRAVENOUS at 20:02

## 2020-01-14 RX ADMIN — SIMVASTATIN 20 MG: 20 TABLET, FILM COATED ORAL at 20:18

## 2020-01-14 RX ADMIN — GABAPENTIN 300 MG: 300 CAPSULE ORAL at 15:39

## 2020-01-14 RX ADMIN — APIXABAN 5 MG: 5 TABLET, FILM COATED ORAL at 20:18

## 2020-01-14 RX ADMIN — FLECAINIDE ACETATE 50 MG: 100 TABLET ORAL at 09:17

## 2020-01-14 RX ADMIN — BUPROPION HYDROCHLORIDE 150 MG: 150 TABLET, FILM COATED, EXTENDED RELEASE ORAL at 09:16

## 2020-01-14 RX ADMIN — DIPHENHYDRAMINE HCL 25 MG: 25 TABLET ORAL at 02:25

## 2020-01-14 RX ADMIN — PANTOPRAZOLE SODIUM 40 MG: 40 TABLET, DELAYED RELEASE ORAL at 09:16

## 2020-01-14 RX ADMIN — METFORMIN HYDROCHLORIDE 500 MG: 500 TABLET, EXTENDED RELEASE ORAL at 09:16

## 2020-01-14 ASSESSMENT — PAIN DESCRIPTION - ORIENTATION: ORIENTATION: MID

## 2020-01-14 ASSESSMENT — PAIN DESCRIPTION - PROGRESSION
CLINICAL_PROGRESSION: NOT CHANGED
CLINICAL_PROGRESSION: NOT CHANGED

## 2020-01-14 ASSESSMENT — PAIN - FUNCTIONAL ASSESSMENT: PAIN_FUNCTIONAL_ASSESSMENT: ACTIVITIES ARE NOT PREVENTED

## 2020-01-14 ASSESSMENT — PAIN SCALES - GENERAL
PAINLEVEL_OUTOF10: 0

## 2020-01-14 ASSESSMENT — PAIN DESCRIPTION - LOCATION: LOCATION: CHEST

## 2020-01-14 ASSESSMENT — PAIN DESCRIPTION - FREQUENCY: FREQUENCY: INTERMITTENT

## 2020-01-14 ASSESSMENT — PAIN DESCRIPTION - ONSET: ONSET: ON-GOING

## 2020-01-14 ASSESSMENT — PAIN DESCRIPTION - DESCRIPTORS: DESCRIPTORS: PRESSURE

## 2020-01-14 ASSESSMENT — PAIN DESCRIPTION - PAIN TYPE: TYPE: ACUTE PAIN

## 2020-01-14 NOTE — PLAN OF CARE
Post procedure site check completed. Surgical site is within normal limits and appears to be free of complications. All concerns were reviewed and questions answered. Patient verbalized understanding.
medication offered as needed. Will cont to monitor.    Goal: Control of acute pain  Description  Control of acute pain  Outcome: Ongoing  Goal: Control of chronic pain  Description  Control of chronic pain  Outcome: Ongoing     Electronically signed by Hiral Hazel RN on 1/14/2020 at 12:17 AM

## 2020-01-15 ENCOUNTER — TELEPHONE (OUTPATIENT)
Dept: CARDIOLOGY CLINIC | Age: 75
End: 2020-01-15

## 2020-01-15 VITALS
HEART RATE: 79 BPM | TEMPERATURE: 98.6 F | HEIGHT: 65 IN | OXYGEN SATURATION: 93 % | WEIGHT: 237.22 LBS | DIASTOLIC BLOOD PRESSURE: 67 MMHG | SYSTOLIC BLOOD PRESSURE: 108 MMHG | BODY MASS INDEX: 39.52 KG/M2 | RESPIRATION RATE: 19 BRPM

## 2020-01-15 PROCEDURE — 99239 HOSP IP/OBS DSCHRG MGMT >30: CPT | Performed by: NURSE PRACTITIONER

## 2020-01-15 PROCEDURE — 94761 N-INVAS EAR/PLS OXIMETRY MLT: CPT

## 2020-01-15 PROCEDURE — 2580000003 HC RX 258: Performed by: INTERNAL MEDICINE

## 2020-01-15 PROCEDURE — 6370000000 HC RX 637 (ALT 250 FOR IP): Performed by: INTERNAL MEDICINE

## 2020-01-15 RX ADMIN — PANTOPRAZOLE SODIUM 40 MG: 40 TABLET, DELAYED RELEASE ORAL at 09:37

## 2020-01-15 RX ADMIN — ESCITALOPRAM OXALATE 10 MG: 10 TABLET ORAL at 09:37

## 2020-01-15 RX ADMIN — GABAPENTIN 300 MG: 300 CAPSULE ORAL at 09:38

## 2020-01-15 RX ADMIN — SODIUM CHLORIDE, PRESERVATIVE FREE 10 ML: 5 INJECTION INTRAVENOUS at 09:38

## 2020-01-15 RX ADMIN — METFORMIN HYDROCHLORIDE 500 MG: 500 TABLET, EXTENDED RELEASE ORAL at 09:37

## 2020-01-15 RX ADMIN — APIXABAN 5 MG: 5 TABLET, FILM COATED ORAL at 09:38

## 2020-01-15 RX ADMIN — BUPROPION HYDROCHLORIDE 150 MG: 150 TABLET, FILM COATED, EXTENDED RELEASE ORAL at 09:37

## 2020-01-15 ASSESSMENT — PAIN SCALES - GENERAL
PAINLEVEL_OUTOF10: 0

## 2020-01-15 ASSESSMENT — PAIN SCALES - WONG BAKER: WONGBAKER_NUMERICALRESPONSE: 0

## 2020-01-15 ASSESSMENT — PAIN DESCRIPTION - PROGRESSION
CLINICAL_PROGRESSION: NOT CHANGED

## 2020-01-15 NOTE — TELEPHONE ENCOUNTER
Patient was discharged form [facility]. Dr. Brianna Farrell wanted patient to have limited echo in 1 week. I called echo department and was given 1/21/20 at 3:30 per Yuniel Romano. I called scheduling earlier to schedule but had to wait until patient was discharged to schedule. Called scheduling back and scheduled patient for date and time above. I called patient to confirm date and time with her, no answer.  VELMA.

## 2020-01-15 NOTE — PROGRESS NOTES
SUBJECTIVE:    Phyllistine  is a 66 y.o. female with a PMH of TAZ, CKD, HTN, anxiety, complete heart block s/p dual chamber PPM (4/30/19), paroxysmal AFIB that was detected on her dual chamber PPM.    HPI: Patient is here today for follow up from radiofrequency ablation of AFIB and PVI with Dr. Johanna Mccoy (1/13/2020). Postoperatively she was hypotensive and found to have a moderate pericardial effusion. Her BP was stable at d/c around 110's/70's. Her BP medications were stopped, along with metoprolol/flecainide. Repeat ECHO shows mildly increased pericardial effusion. BP is stable today.  She feels very tired but denies feeling dizzy, lightheaded, SOB, and syncopal.       Allergies   Allergen Reactions    Pollen Extract     Tape [Adhesive Tape] Itching       Past Medical History:   Diagnosis Date    Abnormal EKG     RBBB < 2008    Abnormal glucose tolerance test     Cataract     Chronic kidney disease (CKD), stage III (moderate) (HCC)     Elbow fracture 6/5/2008    Generalized anxiety disorder     GERD (gastroesophageal reflux disease)     Herpes zoster 8/2015    Hypertension     Intermittent atrial fibrillation ([address] 10/21/2019    Lipoma of skin     Mixed hyperlipidemia     Myalgia and myositis     Obstructive sleep apnea     Osteoarthritis     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 Ramandeep Caldwell  6/08    ORIF right;  both fractured    PACEMAKER PLACEMENT  05/08/2019    SOLIS AND BSO         Social History     Tobacco History     Smoking Status  Never Smoker    Smokeless Tobacco Use  Never Used          Alcohol History     Alcohol Use Status  Yes Comment  rarely          Drug Use     Drug Use Status  Not Asked

## 2020-01-15 NOTE — DISCHARGE SUMMARY
EP Discharge Summary  [address], [address]   1/15/20    Patient :Matilde Gloria  Room/Bed: B9J-[id number]  Medical Record: [id number]  YOB: 1945    Admit date: 1/13/2020  Discharge date and time: 01/15/20     Admitting Physician: Sharyle Reader, MD   Discharge Physician: Sharyle Reader, MD    Admission Diagnoses: Paroxysmal atrial fibrillation ([address] [I48.0]  Atrial fibrillation ([address] [I48.91]  Paroxysmal atrial fibrillation ([address] [I48.0]  Discharge Diagnoses: pAF s/p RFA of AFIB and PVI    Discharged Condition: stable    Hospital Course: 66 y.o. woman with PMH of paroxysmal AFIB presented to [facility] for radiofrequency ablation of AFIB with Dr. Vianey Vergara (1/13/2020). Postoperatively she was hypotensive and with chest pressure in which ECHO showed moderate pericardia effusion. Today, her BP is stable and she is up eating, drinking, voiding, and ambulating without issues. She will be need an ECHO in 1 week to assess effusion. Consults: none    Significant Diagnostic Studies: Limited ECHO 1/14/2020  Summary   Moderate pericardial effusion without tamponade   Pacer wire present in RV   Normal LV size with ; EF 40%   The IVC is dilated. (3.4cm)   Dilated IVC with poor inspiration collapse consistent with elevated right   atrial pressure. Estimated right atrial pressure is 15 mmHg. Discharge Medications:   Current Discharge Medication List      CONTINUE these medications which have NOT CHANGED    Details   gabapentin (NEURONTIN) 300 MG capsule TAKE ONE CAPSULE BY MOUTH THREE TIMES DAILY.   Qty: 270 capsule, Refills: 1      buPROPion (WELLBUTRIN XL) 150 MG extended release tablet TAKE 1 TABLET BY MOUTH EVERY DAY  Qty: 90 tablet, Refills: 3    Associated Diagnoses: Generalized anxiety disorder      escitalopram (LEXAPRO) 10 MG tablet TAKE 1 TABLET BY MOUTH DAILY  Qty: 90 tablet, Refills: 1    Associated Diagnoses: Generalized anxiety disorder      metFORMIN (GLUCOPHAGE-XR) 500 MG extended release tablet TAKE 1 TABLET DAILY: END OF BREAKFAST OR AT BEDTIME. Qty: 90 tablet, Refills: 1    Associated Diagnoses: Impaired glucose tolerance      simvastatin (ZOCOR) 20 MG tablet TAKE 1 TABLET BY MOUTH EVERY DAY AT NIGHT  Qty: 90 tablet, Refills: 2    Associated Diagnoses: Mixed hyperlipidemia      apixaban (ELIQUIS) 5 MG TABS tablet Take 1 tablet by mouth 2 times daily  Qty: 60 tablet, Refills: 5    Associated Diagnoses: Complete heart block ([address] Atrial fibrillation, unspecified type (HCC)      diphenhydrAMINE (BENADRYL) 25 MG capsule Take 25 mg by mouth nightly as needed for Itching      omeprazole (PRILOSEC) 20 MG capsule Take 20 mg by mouth daily as needed       cetirizine (ZYRTEC) 10 MG tablet Take 10 mg by mouth daily as needed          STOP taking these medications       losartan (COZAAR) 50 MG tablet Comments:   Reason for Stopping:         amLODIPine (NORVASC) 2.5 MG tablet Comments:   Reason for Stopping:               Discharge Exam:  Carotid Arteries: normal pulsation bilaterally  Lungs: clear to auscultation without wheezes or rales  Heart: regular rate and rhythm, S1, S2 normal, no murmur, click, rub or gallop  Extremities: negative  Incision: No hematoma    Disposition: home    Patient Instructions:   Prior to Admission medications    Medication Sig Start Date End Date Taking? Authorizing Provider   gabapentin (NEURONTIN) 300 MG capsule TAKE ONE CAPSULE BY MOUTH THREE TIMES DAILY.  12/4/19 3/3/20 Yes Merly Jean MD   buPROPion (WELLBUTRIN XL) 150 MG extended release tablet TAKE 1 TABLET BY MOUTH EVERY DAY 11/22/19  Yes Merly Jean MD   escitalopram (LEXAPRO) 10 MG tablet TAKE 1 TABLET BY MOUTH DAILY 11/22/19  Yes Merly Jean MD   metFORMIN (GLUCOPHAGE-XR) 500 MG extended release tablet TAKE 1 TABLET DAILY: END OF BREAKFAST OR AT BEDTIME. 11/15/19  Yes Merly Jean MD   simvastatin (ZOCOR) 20 MG tablet TAKE 1 TABLET BY MOUTH EVERY DAY AT NIGHT 11/11/19

## 2020-01-15 NOTE — PROGRESS NOTES
1057: Pt admitted to [place] [address] room 1310 with cath lab staff at bedside. Pt on 1L nasal cannula. Paced rhythm noted on the monitor. Groin site check complete. No drainage noted. Small hard spot noted. Unable to tell if there is a hematoma or if figure 8 is to tight, will monitor. 1101: Admissions assessment complete and noted in chart. Pt alert and oriented x4, remains drowsy and frequently asking the same questions. Questions were answered. Pulses palpable. Some edema noted on lower extremities. Bruising noted on the right upper forearm. 1110: EKG complete. 1120: family at bedside. Updated on POC, all questions answered. 1200; Will hold off on giving metoprolol and flecainide for now due to SBP being 's    1530: wray catheter removed at this time without difficulty. Pt assisted OOB. Did feel dizzy. Once the pt stood up felt fine, ambulated to the bathroom. Repositioned back into bed due to feeling dizzy and some pressure in chest. Explain some pressure is normal from the procedure. SBP in the 90's. Will monitor. Groin site remains unchanged. 1600: reassessment complete and remains unchanged. Pt states she still feels dizzy, does not do well with anaesthesia.  Explained it maybe from that.    1900: Handoff with Donna Vick
Handoff with Lilian Springer RN. Patient is resting in bed oriented x 4 with no needs at this time.
Medication Reconciliation    List of medications patient is currently taking is complete. Source of information: 1.  Conversation with patient and family at bedside                                      2. EPIC records      Allergies  Pollen extract and Tape [adhesive tape]
Date    HDL 46 04/03/2019    HDL 49 09/15/2011    LDLCALC 89 04/03/2019    TRIG 200 04/03/2019    TSH 1.27 02/07/2011     LIVER PROFILE:  Lab Results   Component Value Date    AST 12 04/30/2019    AST 12 04/03/2019    ALT 16 04/30/2019    ALT 12 04/03/2019       -----------------------------------------------------------------  Telemetry: Personally reviewed  SR    Objective:   Vitals: BP 91/70   Pulse 83   Temp 97.8 °F (36.6 °C) (Oral)   Resp 16   Ht 5' 5\" (1.651 m)   Wt 236 lb 15.9 oz (107.5 kg)   SpO2 94%   BMI 39.44 kg/m²   General appearance: alert, appears stated age and cooperative, No acute distress   Skin: Skin color, texture, turgor normal. No rashes or ecchymosis. Neck: no JVD, supple, symmetrical, trachea midline   Lungs: clear bilaterally, no accessory muscle use, no respiratory distress  Heart: RRR. S1/S2 heard.    Abdomen: soft, non-tender; bowel sounds normal  Extremities: No edema, DP +palp  Psychiatric: normal insight and affect        Assessment & Plan:      S/P RFA of AFIB   -1/13/2020 with Dr. Sheila Mendoza  -right groin stable  -sinus on tele  -BP low; holding anti-hypertensives  -given BB/flecainide but pressure lowered    Chest pressure  -ECHO showing moderate pericardial effusion   -will continue to monitor in hospital     Hypotension  -w/ pericardial effusion and post procedure  -improving; SBP 90's   -holding antihypertensives     Pericardial effusion  -stable  -will monitor for worsening hypotension/symptoms     PLAN  Monitor overnight  BP improving   Holding antihypertensive medications     [facility] CNP  Electrophysiology   [employer]

## 2020-01-20 ENCOUNTER — TELEPHONE (OUTPATIENT)
Dept: INTERNAL MEDICINE CLINIC | Age: 75
End: 2020-01-20

## 2020-01-20 NOTE — TELEPHONE ENCOUNTER
Patient wants to know if she can have a note to return back to work prior to her appointment on 1/30 at 10:30 from her abrasions, please advise.

## 2020-01-21 ENCOUNTER — NURSE ONLY (OUTPATIENT)
Dept: CARDIOLOGY CLINIC | Age: 75
End: 2020-01-21
Payer: MEDICARE

## 2020-01-21 ENCOUNTER — HOSPITAL ENCOUNTER (OUTPATIENT)
Dept: NON INVASIVE DIAGNOSTICS | Age: 75
Discharge: HOME | End: 2020-01-21
Payer: MEDICARE

## 2020-01-21 PROCEDURE — 93308 TTE F-UP OR LMTD: CPT

## 2020-01-21 PROCEDURE — 93294 REM INTERROG EVL PM/LDLS PM: CPT | Performed by: INTERNAL MEDICINE

## 2020-01-21 PROCEDURE — 93296 REM INTERROG EVL PM/IDS: CPT | Performed by: INTERNAL MEDICINE

## 2020-01-21 NOTE — LETTER
[address] [facility] [phone]  [address] [facility],[address] Floor [phone]    Pacemaker/Defibrillator Clinic          01/22/20        [address] [facility]  [address] [id number]        Dear [address] [place] [address]    This letter is to inform you that we received the transmission from your monitor at home that checks your implanted heart device. The next date your monitor will automatically transmit will be 4-22-20. If your report needs attention we will notify you. Your device and monitor are wireless and most transmit cellularly, but please periodically check your monitor is still plugged in to the electrical outlet. If you still use the telephone land line to send please ensure the connection to the phone [email or web address] is secure. This will help to ensure successful automatic transmissions in the future. Also, the monitor needs to be close to you while sleeping at night. Please be aware that the remote device transmission sites are periodically monitored only during regular business hours during which simultaneous in-office device clinics are being run. If your transmission requires attention, we will contact you as soon as possible. Thank you.             [address]

## 2020-01-22 ENCOUNTER — OFFICE VISIT (OUTPATIENT)
Dept: CARDIOLOGY CLINIC | Age: 75
End: 2020-01-22
Payer: MEDICARE

## 2020-01-22 VITALS
DIASTOLIC BLOOD PRESSURE: 68 MMHG | HEART RATE: 86 BPM | OXYGEN SATURATION: 98 % | BODY MASS INDEX: 38.32 KG/M2 | SYSTOLIC BLOOD PRESSURE: 120 MMHG | HEIGHT: 65 IN | WEIGHT: 230 LBS

## 2020-01-22 PROCEDURE — G8427 DOCREV CUR MEDS BY ELIG CLIN: HCPCS | Performed by: NURSE PRACTITIONER

## 2020-01-22 PROCEDURE — 1090F PRES/ABSN URINE INCON ASSESS: CPT | Performed by: NURSE PRACTITIONER

## 2020-01-22 PROCEDURE — 1111F DSCHRG MED/CURRENT MED MERGE: CPT | Performed by: NURSE PRACTITIONER

## 2020-01-22 PROCEDURE — 1036F TOBACCO NON-USER: CPT | Performed by: NURSE PRACTITIONER

## 2020-01-22 PROCEDURE — 4040F PNEUMOC VAC/ADMIN/RCVD: CPT | Performed by: NURSE PRACTITIONER

## 2020-01-22 PROCEDURE — G8482 FLU IMMUNIZE ORDER/ADMIN: HCPCS | Performed by: NURSE PRACTITIONER

## 2020-01-22 PROCEDURE — 1123F ACP DISCUSS/DSCN MKR DOCD: CPT | Performed by: NURSE PRACTITIONER

## 2020-01-22 PROCEDURE — 99213 OFFICE O/P EST LOW 20 MIN: CPT | Performed by: NURSE PRACTITIONER

## 2020-01-22 PROCEDURE — 93000 ELECTROCARDIOGRAM COMPLETE: CPT | Performed by: NURSE PRACTITIONER

## 2020-01-22 PROCEDURE — G8399 PT W/DXA RESULTS DOCUMENT: HCPCS | Performed by: NURSE PRACTITIONER

## 2020-01-22 PROCEDURE — G8417 CALC BMI ABV UP PARAM F/U: HCPCS | Performed by: NURSE PRACTITIONER

## 2020-01-22 RX ORDER — COLCHICINE 0.6 MG/1
0.6 TABLET ORAL DAILY
Qty: 30 TABLET | Refills: 0 | Status: SHIPPED | OUTPATIENT
Start: 2020-01-22 | End: 2020-02-17 | Stop reason: SDUPTHER

## 2020-01-22 ASSESSMENT — ENCOUNTER SYMPTOMS
WHEEZING: 0
COLOR CHANGE: 0
CHEST TIGHTNESS: 0
SHORTNESS OF BREATH: 0
BLOOD IN STOOL: 0
DIARRHEA: 1

## 2020-01-22 NOTE — PATIENT INSTRUCTIONS
Start Colchicine 0.6 mg QD for 4 weeks   Limited ECHO in 2 weeks   Stay hydrated!!   Go to the ED if symptoms of dizziness, feeling like you may pass out, or lightheadedness

## 2020-01-30 ENCOUNTER — OFFICE VISIT (OUTPATIENT)
Dept: INTERNAL MEDICINE CLINIC | Age: 75
End: 2020-01-30
Payer: MEDICARE

## 2020-01-30 VITALS
HEIGHT: 65 IN | OXYGEN SATURATION: 96 % | SYSTOLIC BLOOD PRESSURE: 128 MMHG | HEART RATE: 90 BPM | DIASTOLIC BLOOD PRESSURE: 72 MMHG | WEIGHT: 226 LBS | BODY MASS INDEX: 37.65 KG/M2

## 2020-01-30 PROBLEM — I31.9 PERICARDITIS: Status: ACTIVE | Noted: 2020-01-30

## 2020-01-30 LAB — HBA1C MFR BLD: 6 %

## 2020-01-30 PROCEDURE — G8427 DOCREV CUR MEDS BY ELIG CLIN: HCPCS | Performed by: FAMILY MEDICINE

## 2020-01-30 PROCEDURE — 99213 OFFICE O/P EST LOW 20 MIN: CPT | Performed by: FAMILY MEDICINE

## 2020-01-30 PROCEDURE — G8399 PT W/DXA RESULTS DOCUMENT: HCPCS | Performed by: FAMILY MEDICINE

## 2020-01-30 PROCEDURE — 83036 HEMOGLOBIN GLYCOSYLATED A1C: CPT | Performed by: FAMILY MEDICINE

## 2020-01-30 PROCEDURE — 1090F PRES/ABSN URINE INCON ASSESS: CPT | Performed by: FAMILY MEDICINE

## 2020-01-30 PROCEDURE — 1111F DSCHRG MED/CURRENT MED MERGE: CPT | Performed by: FAMILY MEDICINE

## 2020-01-30 PROCEDURE — 1036F TOBACCO NON-USER: CPT | Performed by: FAMILY MEDICINE

## 2020-01-30 PROCEDURE — G8417 CALC BMI ABV UP PARAM F/U: HCPCS | Performed by: FAMILY MEDICINE

## 2020-01-30 PROCEDURE — 4040F PNEUMOC VAC/ADMIN/RCVD: CPT | Performed by: FAMILY MEDICINE

## 2020-01-30 PROCEDURE — G8482 FLU IMMUNIZE ORDER/ADMIN: HCPCS | Performed by: FAMILY MEDICINE

## 2020-01-30 PROCEDURE — 1123F ACP DISCUSS/DSCN MKR DOCD: CPT | Performed by: FAMILY MEDICINE

## 2020-01-30 SDOH — ECONOMIC STABILITY: TRANSPORTATION INSECURITY
IN THE PAST 12 MONTHS, HAS LACK OF TRANSPORTATION KEPT YOU FROM MEETINGS, WORK, OR FROM GETTING THINGS NEEDED FOR DAILY LIVING?: NO

## 2020-01-30 SDOH — ECONOMIC STABILITY: FOOD INSECURITY: WITHIN THE PAST 12 MONTHS, YOU WORRIED THAT YOUR FOOD WOULD RUN OUT BEFORE YOU GOT MONEY TO BUY MORE.: NEVER TRUE

## 2020-01-30 SDOH — ECONOMIC STABILITY: FOOD INSECURITY: WITHIN THE PAST 12 MONTHS, THE FOOD YOU BOUGHT JUST DIDN'T LAST AND YOU DIDN'T HAVE MONEY TO GET MORE.: NEVER TRUE

## 2020-01-30 SDOH — ECONOMIC STABILITY: TRANSPORTATION INSECURITY
IN THE PAST 12 MONTHS, HAS THE LACK OF TRANSPORTATION KEPT YOU FROM MEDICAL APPOINTMENTS OR FROM GETTING MEDICATIONS?: NO

## 2020-01-30 ASSESSMENT — ENCOUNTER SYMPTOMS
CHEST TIGHTNESS: 0
COUGH: 0
WHEEZING: 0
SHORTNESS OF BREATH: 0

## 2020-01-30 ASSESSMENT — PATIENT HEALTH QUESTIONNAIRE - PHQ9
DEPRESSION UNABLE TO ASSESS: FUNCTIONAL CAPACITY MOTIVATION LIMITS ACCURACY
SUM OF ALL RESPONSES TO PHQ QUESTIONS 1-9: 0
2. FEELING DOWN, DEPRESSED OR HOPELESS: 0
1. LITTLE INTEREST OR PLEASURE IN DOING THINGS: 0
SUM OF ALL RESPONSES TO PHQ9 QUESTIONS 1 & 2: 0
SUM OF ALL RESPONSES TO PHQ QUESTIONS 1-9: 0

## 2020-01-30 NOTE — PROGRESS NOTES
Subjective:      Patient ID: Camilo Lagunas is a 66 y.o. female. HPI     Chief Complaint   Patient presents with    Follow Up After Procedure     ablation      Her ablation worked. She now has water on the heart. She is here for hospital follow-up after this procedure. Postoperatively she developed hypotension and pericardial effusion. By discharge her blood pressure remained stable. Her blood pressure medications were discontinued along with beta-blocker and flecainide. Repeat echocardiogram showed a mildly increased pericardial effusion. She was placed on colchicine 0.6 mg daily. Echo:  Heart EF is 55-60%  She has moderate to large peicardial effusion. She has a high co-pay for echos. $200 per scan. Her max out of pocket. is $4,000    She is just here for procedure follow up. She said she is feeling fine. She denies any cardiac symptoms. She denies chest pain. Sometimes she is a little bothered by how the pacemaker battery pack moves up and down in her chest wall. She would like me to check right groin. Cannot see it well. Feels a little roughness and soreness. This is the groin where the procedure catheter was inserted. Review of Systems   Respiratory: Negative for cough, chest tightness, shortness of breath and wheezing. Cardiovascular: Negative for chest pain, palpitations and leg swelling. Skin: Negative for rash and wound. Neurological: Negative for dizziness, syncope, facial asymmetry, speech difficulty, weakness, numbness and headaches. Psychiatric/Behavioral:        Poor memory around the time of the surgery and just after. Does not really remember the details when she has these procedures done       Objective:   Physical Exam  Vitals signs reviewed. Constitutional:       Appearance: She is well-developed. Cardiovascular:      Rate and Rhythm: Normal rate and regular rhythm. Occasional extrasystoles are present. Heart sounds: Heart sounds are distant.  No friction rub. Pulmonary:      Effort: Pulmonary effort is normal.      Breath sounds: Normal breath sounds. Musculoskeletal:      Right lower leg: No edema. Left lower leg: No edema. Skin:     General: Skin is warm and dry. Coloration: Skin is not pale. Findings: Lesion (See comments) present. No erythema. Comments: Right inguinal incision shows healing scars. There is no heat, and expected redness. A dissolvable suture is close to the surface of the skin and may be early suture granuloma. Psychiatric:         Behavior: Behavior normal.         Assessment:      1. Other acute pericarditis  Agree with continued use of colchicine. She does not seem to have symptoms and will be monitored with echoes. Her next echocardiogram is February 5.    2. Attention to dressings and sutures  Reassured on the groin incision. She may apply cocoa butter rubbed in well to the incisions to see if she can soften the area, especially the dissolvable stitch that is just below the skin    3. Impaired glucose tolerance  Lab Results   Component Value Date    LABA1C 6.0 01/30/2020     Diet and weight loss encouraged. No need for medication.  - POCT glycosylated hemoglobin (Hb A1C)    4. Status post ablation of atrial fibrillation  The ablation has resolved the A. fib. 5. Hospital discharge follow-up  Reviewed hospital reports. Plan:      See orders. See after visit summary, patient instructions, and reference hand-outs. A PRINTED SUMMARY = AVS GIVEN TO THE PATIENT. Discussed use, benefit, and side effects of prescribed medications. Barriers to medication compliance addressed. All patient questions answered.           Wellington Ricketts MD

## 2020-01-30 NOTE — LETTER
[facility]  [address] [address]  Phone: [phone]  Fax: [phone]    Merly Jean MD        January 30, 2020     Patient: Brannon Conde   YOB: 1945   Date of Visit: 1/30/2020       To Whom It May Concern: It is my medical opinion that Leonie Grimes is unable to work from 1/13/2020 through Sunday Feb 16th, 2020. She can return to work on Monday Feb 17th unless cardiologist extends further. If you have any questions or concerns, please don't hesitate to call.     Sincerely,        Merly Jean MD

## 2020-02-05 ENCOUNTER — HOSPITAL ENCOUNTER (OUTPATIENT)
Dept: NON INVASIVE DIAGNOSTICS | Age: 75
Discharge: HOME | End: 2020-02-05
Payer: MEDICARE

## 2020-02-05 PROCEDURE — 93306 TTE W/DOPPLER COMPLETE: CPT

## 2020-02-13 RX ORDER — METOPROLOL SUCCINATE 25 MG/1
12.5 TABLET, EXTENDED RELEASE ORAL DAILY
Qty: 45 TABLET | Refills: 3 | Status: SHIPPED | OUTPATIENT
Start: 2020-02-13 | End: 2020-11-18

## 2020-02-13 RX ORDER — COLCHICINE 0.6 MG/1
TABLET ORAL
Qty: 30 TABLET | Refills: 0 | OUTPATIENT
Start: 2020-02-13

## 2020-02-13 RX ORDER — LISINOPRIL 2.5 MG/1
2.5 TABLET ORAL DAILY
Qty: 90 TABLET | Refills: 3 | Status: SHIPPED | OUTPATIENT
Start: 2020-02-13 | End: 2021-01-26 | Stop reason: SINTOL

## 2020-02-14 NOTE — PROGRESS NOTES
(BENADRYL) 25 MG capsule Take 25 mg by mouth nightly as needed for Itching   Yes Historical Provider, MD   omeprazole (PRILOSEC) 20 MG capsule Take 20 mg by mouth daily as needed    Yes Historical Provider, MD   cetirizine (ZYRTEC) 10 MG tablet Take 10 mg by mouth daily as needed    Yes Historical Provider, MD       Social History:   reports that she has never smoked. She has never used smokeless tobacco. She reports current alcohol use. Family History:  family history includes COPD in her mother; Cancer in her mother; Colon Cancer in her father; Elevated Lipids in her son and son; Hypertension in her mother and son; Thyroid Disease in her maternal aunt. Reviewed.  Denies family history of sudden cardiac death, arrhythmia, premature CAD    Review of System:    · General ROS: negative for - chills, fever + fatigue  · Psychological ROS: negative for - anxiety or depression  · Ophthalmic ROS: negative for - eye pain or loss of vision  · ENT ROS: negative for - epistaxis, headaches, nasal discharge, sore throat   · Allergy and Immunology ROS: negative for - hives, nasal congestion   · Hematological and Lymphatic ROS: negative for - bleeding problems, blood clots, bruising or jaundice  · Endocrine ROS: negative for - skin changes, temperature intolerance or unexpected weight changes  · Respiratory ROS: negative for - cough, hemoptysis, pleuritic pain, SOB, sputum changes or wheezing  · Cardiovascular ROS: Per HPI + LCW device  · Gastrointestinal ROS: negative for - abdominal pain, blood in stools, diarrhea, hematemesis, melena, nausea/vomiting or swallowing difficulty/pain  · Genito-Urinary ROS: negative for - dysuria or incontinence  · Musculoskeletal ROS: negative for - joint swelling or muscle pain  · Neurological ROS: negative for - confusion, dizziness, gait disturbance, headaches, numbness/tingling, seizures, speech problems, tremors, visual changes or weakness  · Dermatological ROS: negative for - lead on 9/20/19.   -RV lead was not seated in the ideal position, which could have been indicative of a micro-dislodgement, and it was using more battery power to pace she subsequently underwent revision of her RA lead on 9/20/19.  -Interrogation of [employer] dual chamber PPM: normal PPM functioning. Please refer to the PPM interrogation from [id number] on 10/9/19. Essentially shows that the RV lead has once again increased in pacing capture threshold from 0.75V @ 0.4ms to now 1.75V @ 0.4ms. Given that this has occurred twice, we are suspicious that the patient has some biologic reaction to the Rv lead implantation that causes perhaps increased fibrosis leading to increased pacing capture threshold (PCT). At this time, given that the current PCT is not prohibitively high, would recommend that we continue a watch-wait-see approach. Atrial fibrillation, paroxysmal  -Seen on device interrogation as above. -[id number] Score 5 (HTN, AGE, Female Gender, DM)  -On Eliquis 5 mg BID for stroke risk reduction.  -Tolerating well no signs or symptoms of abnormal brusing or bleeding. -S/p atrial fibrillation ablation on 1/13/2020.  -Postoperatively she was hypotensive and found to have a moderate pericardial effusion. Her BP was stable at d/c around 110's/70's. Her BP medications were stopped, along with metoprolol/flecainide. Limited Echo on 1/21/2020 showed mildly increased pericardial effusion and Ejection fraction estimated to be 55-60%. She underwent repeat Echo on 1/21/2020 and it showed decrease in LVEF of 25-30 %. She was stared on Lisinopril 2.5 mg and Toprol XL 12.5 mg daily on 2/13/2020 for anti-remodeling. Educated that her Toprol XL can cause fatigue.   -Will repeat Echo in 3 months time to reassess LVEF.   -EKG today showed SR. Thank you for allowing me to participate in the care of [address] [place] [address]. All questions and concerns were addressed to the patient/family.  Alternatives to my treatment were discussed. Follow up is scheduled for 4/20/2020. This note was scribed in the presence of Dom Mclain MD by Tutu Sebastian RN. The scribe's documentation has been prepared under my direction and personally reviewed by me in its entirety. I confirm that the note above accurately reflects all work, physical examination, the discussion of treatments and procedures, and medical decision making performed by me.       Dom Mclain MD, MS, [facility]  Cardiac Electrophysiology  [address]  [address] [facility], [address] [place] [address]  [phone]

## 2020-02-17 ENCOUNTER — OFFICE VISIT (OUTPATIENT)
Dept: CARDIOLOGY CLINIC | Age: 75
End: 2020-02-17
Payer: MEDICARE

## 2020-02-17 VITALS
OXYGEN SATURATION: 95 % | HEART RATE: 92 BPM | WEIGHT: 219 LBS | DIASTOLIC BLOOD PRESSURE: 70 MMHG | SYSTOLIC BLOOD PRESSURE: 118 MMHG | HEIGHT: 65 IN | BODY MASS INDEX: 36.49 KG/M2

## 2020-02-17 PROCEDURE — 99214 OFFICE O/P EST MOD 30 MIN: CPT | Performed by: INTERNAL MEDICINE

## 2020-02-17 PROCEDURE — G8482 FLU IMMUNIZE ORDER/ADMIN: HCPCS | Performed by: INTERNAL MEDICINE

## 2020-02-17 PROCEDURE — 1036F TOBACCO NON-USER: CPT | Performed by: INTERNAL MEDICINE

## 2020-02-17 PROCEDURE — 4040F PNEUMOC VAC/ADMIN/RCVD: CPT | Performed by: INTERNAL MEDICINE

## 2020-02-17 PROCEDURE — 93000 ELECTROCARDIOGRAM COMPLETE: CPT | Performed by: INTERNAL MEDICINE

## 2020-02-17 PROCEDURE — 1090F PRES/ABSN URINE INCON ASSESS: CPT | Performed by: INTERNAL MEDICINE

## 2020-02-17 PROCEDURE — G8417 CALC BMI ABV UP PARAM F/U: HCPCS | Performed by: INTERNAL MEDICINE

## 2020-02-17 PROCEDURE — G8427 DOCREV CUR MEDS BY ELIG CLIN: HCPCS | Performed by: INTERNAL MEDICINE

## 2020-02-17 PROCEDURE — 1123F ACP DISCUSS/DSCN MKR DOCD: CPT | Performed by: INTERNAL MEDICINE

## 2020-02-17 PROCEDURE — G8399 PT W/DXA RESULTS DOCUMENT: HCPCS | Performed by: INTERNAL MEDICINE

## 2020-02-17 RX ORDER — COLCHICINE 0.6 MG/1
0.6 TABLET ORAL DAILY
Qty: 30 TABLET | Refills: 1 | Status: SHIPPED | OUTPATIENT
Start: 2020-02-17 | End: 2020-03-16

## 2020-03-18 RX ORDER — COLCHICINE 0.6 MG/1
TABLET ORAL
Qty: 30 TABLET | Refills: 1 | Status: SHIPPED | OUTPATIENT
Start: 2020-03-18 | End: 2020-05-19 | Stop reason: SDUPTHER

## 2020-04-22 ENCOUNTER — NURSE ONLY (OUTPATIENT)
Dept: CARDIOLOGY CLINIC | Age: 75
End: 2020-04-22
Payer: MEDICARE

## 2020-04-22 PROCEDURE — 93296 REM INTERROG EVL PM/IDS: CPT | Performed by: INTERNAL MEDICINE

## 2020-04-22 PROCEDURE — 93294 REM INTERROG EVL PM/LDLS PM: CPT | Performed by: INTERNAL MEDICINE

## 2020-05-08 RX ORDER — METFORMIN HYDROCHLORIDE 500 MG/1
TABLET, EXTENDED RELEASE ORAL
Qty: 90 TABLET | Refills: 1 | Status: SHIPPED | OUTPATIENT
Start: 2020-05-08 | End: 2020-08-21

## 2020-05-19 RX ORDER — COLCHICINE 0.6 MG/1
TABLET ORAL
Qty: 30 TABLET | Refills: 1 | Status: SHIPPED | OUTPATIENT
Start: 2020-05-19 | End: 2020-07-28

## 2020-08-07 RX ORDER — COLCHICINE 0.6 MG/1
TABLET ORAL
Qty: 30 TABLET | Refills: 5 | Status: SHIPPED | OUTPATIENT
Start: 2020-08-07 | End: 2020-10-20 | Stop reason: ALTCHOICE

## 2020-08-21 RX ORDER — METFORMIN HYDROCHLORIDE 500 MG/1
TABLET, EXTENDED RELEASE ORAL
Qty: 90 TABLET | Refills: 1 | Status: SHIPPED | OUTPATIENT
Start: 2020-08-21 | End: 2021-03-19 | Stop reason: SDUPTHER

## 2020-09-08 ENCOUNTER — TELEPHONE (OUTPATIENT)
Dept: INTERNAL MEDICINE CLINIC | Age: 75
End: 2020-09-08

## 2020-09-08 NOTE — TELEPHONE ENCOUNTER
The risk of taking a recalled product is very low but if she was told she had a recalled Metformin XR, then we can send in a refill for the pharmacy to send her a non-recalled version of her Metformin. Some patients are willing to be switched to the immediate release Metformin (no recalls on any of it). Now everyone tolerates the quick release due to stomach cramps and/or diarrhea.

## 2020-09-08 NOTE — TELEPHONE ENCOUNTER
----- Message from [address] sent at 9/8/2020 11:31 AM EDT -----  Subject: Message to Provider    QUESTIONS  Information for Provider? Pt received a call from [facility] pharmacy which   issued a recall on her prescription metformin 500mg. Pt wants to know   should she stop taking it or what she should do? please advise   ---------------------------------------------------------------------------  --------------  CALL BACK INFO  What is the best way for the office to contact you? OK to leave message on   voicemail  Preferred Call Back Phone Number? [phone]  ---------------------------------------------------------------------------  --------------  SCRIPT ANSWERS  Relationship to Patient?  Self

## 2020-09-16 ENCOUNTER — HOSPITAL ENCOUNTER (OUTPATIENT)
Dept: NON INVASIVE DIAGNOSTICS | Age: 75
Discharge: HOME | End: 2020-09-16
Payer: MEDICARE

## 2020-09-16 LAB
LV EF: 53 %
LVEF MODALITY: NORMAL

## 2020-09-16 PROCEDURE — 93306 TTE W/DOPPLER COMPLETE: CPT

## 2020-09-23 ENCOUNTER — OFFICE VISIT (OUTPATIENT)
Dept: INTERNAL MEDICINE CLINIC | Age: 75
End: 2020-09-23
Payer: MEDICARE

## 2020-09-23 VITALS
TEMPERATURE: 98.6 F | BODY MASS INDEX: 36.65 KG/M2 | HEIGHT: 65 IN | WEIGHT: 220 LBS | HEART RATE: 68 BPM | SYSTOLIC BLOOD PRESSURE: 122 MMHG | DIASTOLIC BLOOD PRESSURE: 78 MMHG

## 2020-09-23 PROBLEM — I31.9 PERICARDITIS: Status: RESOLVED | Noted: 2020-01-30 | Resolved: 2020-09-23

## 2020-09-23 LAB
A/G RATIO: 1.5 (ref 1.1–2.2)
ALBUMIN SERPL-MCNC: 4.3 G/DL (ref 3.4–5)
ALP BLD-CCNC: 82 U/L (ref 40–129)
ALT SERPL-CCNC: 9 U/L (ref 10–40)
ANION GAP SERPL CALCULATED.3IONS-SCNC: 12 MMOL/L (ref 3–16)
AST SERPL-CCNC: 10 U/L (ref 15–37)
BILIRUB SERPL-MCNC: 0.4 MG/DL (ref 0–1)
BUN BLDV-MCNC: 18 MG/DL (ref 7–20)
CALCIUM SERPL-MCNC: 9.7 MG/DL (ref 8.3–10.6)
CHLORIDE BLD-SCNC: 103 MMOL/L (ref 99–110)
CHOLESTEROL, TOTAL: 191 MG/DL (ref 0–199)
CO2: 26 MMOL/L (ref 21–32)
CREAT SERPL-MCNC: 1.1 MG/DL (ref 0.6–1.2)
GFR AFRICAN AMERICAN: 58
GFR NON-AFRICAN AMERICAN: 48
GLOBULIN: 2.8 G/DL
GLUCOSE BLD-MCNC: 116 MG/DL (ref 70–99)
HCT VFR BLD CALC: 48.3 % (ref 36–48)
HDLC SERPL-MCNC: 49 MG/DL (ref 40–60)
HEMOGLOBIN: 15.8 G/DL (ref 12–16)
LDL CHOLESTEROL CALCULATED: 107 MG/DL
MAGNESIUM: 2.5 MG/DL (ref 1.8–2.4)
MCH RBC QN AUTO: 30.2 PG (ref 26–34)
MCHC RBC AUTO-ENTMCNC: 32.7 G/DL (ref 31–36)
MCV RBC AUTO: 92.2 FL (ref 80–100)
PDW BLD-RTO: 14 % (ref 12.4–15.4)
PLATELET # BLD: 316 K/UL (ref 135–450)
PMV BLD AUTO: 7.6 FL (ref 5–10.5)
POTASSIUM SERPL-SCNC: 5.4 MMOL/L (ref 3.5–5.1)
RBC # BLD: 5.24 M/UL (ref 4–5.2)
SODIUM BLD-SCNC: 141 MMOL/L (ref 136–145)
TOTAL PROTEIN: 7.1 G/DL (ref 6.4–8.2)
TRIGL SERPL-MCNC: 177 MG/DL (ref 0–150)
TSH REFLEX: 0.79 UIU/ML (ref 0.27–4.2)
URIC ACID, SERUM: 8 MG/DL (ref 2.6–6)
VITAMIN B-12: 1773 PG/ML (ref 211–911)
VLDLC SERPL CALC-MCNC: 35 MG/DL
WBC # BLD: 7.3 K/UL (ref 4–11)

## 2020-09-23 PROCEDURE — 1036F TOBACCO NON-USER: CPT | Performed by: FAMILY MEDICINE

## 2020-09-23 PROCEDURE — G8417 CALC BMI ABV UP PARAM F/U: HCPCS | Performed by: FAMILY MEDICINE

## 2020-09-23 PROCEDURE — G8427 DOCREV CUR MEDS BY ELIG CLIN: HCPCS | Performed by: FAMILY MEDICINE

## 2020-09-23 PROCEDURE — 1090F PRES/ABSN URINE INCON ASSESS: CPT | Performed by: FAMILY MEDICINE

## 2020-09-23 PROCEDURE — 99214 OFFICE O/P EST MOD 30 MIN: CPT | Performed by: FAMILY MEDICINE

## 2020-09-23 PROCEDURE — 1123F ACP DISCUSS/DSCN MKR DOCD: CPT | Performed by: FAMILY MEDICINE

## 2020-09-23 PROCEDURE — 4040F PNEUMOC VAC/ADMIN/RCVD: CPT | Performed by: FAMILY MEDICINE

## 2020-09-23 PROCEDURE — 3288F FALL RISK ASSESSMENT DOCD: CPT | Performed by: FAMILY MEDICINE

## 2020-09-23 PROCEDURE — G8399 PT W/DXA RESULTS DOCUMENT: HCPCS | Performed by: FAMILY MEDICINE

## 2020-09-23 ASSESSMENT — ENCOUNTER SYMPTOMS
CHEST TIGHTNESS: 0
COUGH: 0
WHEEZING: 0
DIARRHEA: 1
SHORTNESS OF BREATH: 0

## 2020-09-23 NOTE — PATIENT INSTRUCTIONS
Ask Dr. Arlette Dooley if you still need to be on Colchicine since the water around the heart is gone.  (pericardial fluid).

## 2020-09-23 NOTE — PROGRESS NOTES
Subjective:      Patient ID: Philomena Stevens is a 66 y.o. female. HPI   Chief Complaint   Patient presents with    Medication Check     fasting blood work      Last seen 1/2020 for hospital FU. Due for fasting labs. FU of IGT, mixed HLP HTN, CKD3, morbid obesity. She is feeling fine. Doing well. Did quit her job when pandemic started. Was  at [facility]. No c/o. See Assessment for more detail on conditions addressed today. Patient Active Problem List   Diagnosis    Osteoarthritis    GERD (gastroesophageal reflux disease)    Mixed hyperlipidemia    TAZ on CPAP    Impaired glucose tolerance    Hypertension    Generalized anxiety disorder    Lipoma of skin    Myalgia    Chronic kidney disease (CKD), stage III (moderate) (HCC)    Periodic limb movement disorder    Obesity, Class II, BMI 35-39.9    Lipoma    Ganglion of wrist    Hyperuricemia    Pacemaker    Complete heart block (HCC)    Intermittent atrial fibrillation (HCC)    Atrial fibrillation (HCC)    Paroxysmal atrial fibrillation ([address]    Pericarditis         Outpatient Medications Marked as Taking for the 9/23/20 encounter (Office Visit) with Marcela Dueñas MD   Medication Sig Dispense Refill    metFORMIN (GLUCOPHAGE-XR) 500 MG extended release tablet TAKE 1 TABLET DAILY: 90 tablet 1    colchicine (COLCRYS) 0.6 MG tablet TAKE 1 TABLET BY MOUTH EVERY DAY 30 tablet 5    simvastatin (ZOCOR) 20 MG tablet TAKE 1 TABLET BY MOUTH EVERY DAY AT NIGHT 90 tablet 1    escitalopram (LEXAPRO) 10 MG tablet TAKE 1 TABLET BY MOUTH DAILY 90 tablet 1    gabapentin (NEURONTIN) 300 MG capsule TAKE ONE CAPSULE BY MOUTH THREE TIMES DAILY.  270 capsule 1    lisinopril (PRINIVIL;ZESTRIL) 2.5 MG tablet Take 1 tablet by mouth daily 90 tablet 3    metoprolol succinate (TOPROL XL) 25 MG extended release tablet Take 0.5 tablets by mouth daily 45 tablet 3    buPROPion (WELLBUTRIN XL) 150 MG extended release tablet TAKE 1 TABLET BY MOUTH EVERY DAY 90 tablet 3    apixaban (ELIQUIS) 5 MG TABS tablet Take 1 tablet by mouth 2 times daily 60 tablet 5    diphenhydrAMINE (BENADRYL) 25 MG capsule Take 25 mg by mouth nightly as needed for Itching      omeprazole (PRILOSEC) 20 MG capsule Take 20 mg by mouth daily as needed       cetirizine (ZYRTEC) 10 MG tablet Take 10 mg by mouth daily as needed          Social History     Tobacco Use    Smoking status: Never Smoker    Smokeless tobacco: Never Used   Substance Use Topics    Alcohol use: Yes     Comment: rarely    Drug use: Not on file       Review of Systems   Respiratory: Negative for cough, chest tightness, shortness of breath and wheezing. Cardiovascular: Negative for chest pain, palpitations and leg swelling. Gastrointestinal: Positive for diarrhea (not really diarrhea but loose bowels since GB removed). Skin: Negative for rash and wound. Neurological: Negative for dizziness, syncope, facial asymmetry, speech difficulty, weakness, numbness and headaches. Objective:   Physical Exam  Vitals signs reviewed. Constitutional:       General: She is not in acute distress. Appearance: Normal appearance. She is well-developed. She is not diaphoretic. Eyes:      General: No scleral icterus. Neck:      Musculoskeletal: Neck supple. Thyroid: No thyroid mass or thyromegaly. Vascular: No carotid bruit. Cardiovascular:      Rate and Rhythm: Normal rate and regular rhythm. No extrasystoles are present. Heart sounds: S1 normal and S2 normal. Heart sounds are distant. No murmur. No friction rub. Pulmonary:      Effort: Pulmonary effort is normal. No respiratory distress. Breath sounds: Normal breath sounds. No decreased breath sounds, wheezing, rhonchi or rales. Abdominal:      General: Bowel sounds are normal. There is no abdominal bruit. Palpations: Abdomen is soft. There is no hepatomegaly or mass. Tenderness: There is no abdominal tenderness. Musculoskeletal:      Right lower leg: No edema. Left lower leg: No edema. Lymphadenopathy:      Cervical: No cervical adenopathy. Skin:     General: Skin is warm and dry. Coloration: Skin is not pale. Findings: No erythema. Nails: There is no clubbing. Neurological:      Mental Status: She is alert and oriented to person, place, and time. Motor: No tremor or abnormal muscle tone. Coordination: Coordination normal.      Gait: Gait normal.   Psychiatric:         Speech: Speech normal.         Behavior: Behavior normal.          Wt Readings from Last 3 Encounters:   09/23/20 220 lb (99.8 kg)   02/17/20 219 lb (99.3 kg)   01/30/20 226 lb (102.5 kg)     Temp Readings from Last 3 Encounters:   09/23/20 98.6 °F (37 °C) (Temporal)   01/15/20 98.6 °F (37 °C) (Oral)   01/13/20 96.3 °F (35.7 °C)     BP Readings from Last 3 Encounters:   09/23/20 122/78   02/17/20 118/70   01/30/20 128/72     Pulse Readings from Last 3 Encounters:   09/23/20 68   02/17/20 92   01/30/20 90         Assessment:       1. Impaired glucose tolerance  Lab Results   Component Value Date    LABA1C 6.0 01/30/2020     Monitor lab and encourage healthy diet and weight loss. - TSH with Reflex  - Hemoglobin A1C    2. Gastroesophageal reflux disease, esophagitis presence not specified  On daily PPI and symptoms controlled. - CBC  - Vitamin B12  - Magnesium    3. Chronic kidney disease (CKD), stage III (moderate) ([facility])  Monitor. Overdue for labs. - Comprehensive Metabolic Panel    4. Mixed hyperlipidemia  Lab Results   Component Value Date    LDLCALC 89 04/03/2019     At goal and meeting medical guidelines. Continue treatment. Overdue for labs. Fasting today. - Comprehensive Metabolic Panel  - Lipid Panel  - TSH with Reflex    5. Hyperuricemia  No gout but is on colchicine for her pericardial effusion. Reviewed recent echo and effusion is gone. - Uric Acid    6.  Essential hypertension  BP: 122/78   At goal and meeting medical guidelines. Continue treatment. - Comprehensive Metabolic Panel    7. Paroxysmal atrial fibrillation (HCC)  On anticoagulation. S/p ablation and pacer. Sounds to be in NSR today. Has appt with EP cardio next week. - CBC          Plan:      See orders. See after visit summary, patient instructions, and reference hand-outs. A PRINTED SUMMARY = AVS GIVEN TO THE PATIENT, (or if Virtual Visit, patient told it is available in My Chart or will be mailed if not active on My Chart.)    Discussed use, benefit, and side effects of prescribed medications. Barriers to medication compliance addressed. All patient questions answered.           Sindy Padilla MD

## 2020-09-24 LAB
ESTIMATED AVERAGE GLUCOSE: 119.8 MG/DL
HBA1C MFR BLD: 5.8 %

## 2020-09-30 ENCOUNTER — OFFICE VISIT (OUTPATIENT)
Dept: CARDIOLOGY CLINIC | Age: 75
End: 2020-09-30
Payer: MEDICARE

## 2020-09-30 ENCOUNTER — NURSE ONLY (OUTPATIENT)
Dept: CARDIOLOGY CLINIC | Age: 75
End: 2020-09-30
Payer: MEDICARE

## 2020-09-30 VITALS
OXYGEN SATURATION: 97 % | BODY MASS INDEX: 37.15 KG/M2 | WEIGHT: 223 LBS | TEMPERATURE: 97.5 F | HEART RATE: 79 BPM | HEIGHT: 65 IN | DIASTOLIC BLOOD PRESSURE: 78 MMHG | SYSTOLIC BLOOD PRESSURE: 116 MMHG

## 2020-09-30 PROCEDURE — 1036F TOBACCO NON-USER: CPT | Performed by: INTERNAL MEDICINE

## 2020-09-30 PROCEDURE — 4040F PNEUMOC VAC/ADMIN/RCVD: CPT | Performed by: INTERNAL MEDICINE

## 2020-09-30 PROCEDURE — 93280 PM DEVICE PROGR EVAL DUAL: CPT | Performed by: INTERNAL MEDICINE

## 2020-09-30 PROCEDURE — G8427 DOCREV CUR MEDS BY ELIG CLIN: HCPCS | Performed by: INTERNAL MEDICINE

## 2020-09-30 PROCEDURE — 1090F PRES/ABSN URINE INCON ASSESS: CPT | Performed by: INTERNAL MEDICINE

## 2020-09-30 PROCEDURE — G8399 PT W/DXA RESULTS DOCUMENT: HCPCS | Performed by: INTERNAL MEDICINE

## 2020-09-30 PROCEDURE — 99214 OFFICE O/P EST MOD 30 MIN: CPT | Performed by: INTERNAL MEDICINE

## 2020-09-30 PROCEDURE — 1123F ACP DISCUSS/DSCN MKR DOCD: CPT | Performed by: INTERNAL MEDICINE

## 2020-09-30 PROCEDURE — G8417 CALC BMI ABV UP PARAM F/U: HCPCS | Performed by: INTERNAL MEDICINE

## 2020-09-30 RX ORDER — DABIGATRAN ETEXILATE 150 MG/1
150 CAPSULE, COATED PELLETS ORAL 2 TIMES DAILY
Qty: 60 CAPSULE | Refills: 5 | Status: SHIPPED | OUTPATIENT
Start: 2020-09-30 | End: 2020-10-30 | Stop reason: ALTCHOICE

## 2020-09-30 NOTE — PROGRESS NOTES
Cardiac Electrophysiology Consultation     Date: 9/30/2020  Reason for Consultation: Complete heart block, PPM in situ, atrial fibrillation  Consult Requesting Physician: Yung Padilla MD    Chief Complaint:   Chief Complaint   Patient presents with    Follow-up     afib - s/p ablation - device check      HPI: Margaret Neal is a 66 y.o. patient with a history of diabetes, TAZ on CPAP, CKD stage III , HTN , anxiety. She presented to [facility] ED on 4/30/19 as instructed by her PCP when she was found to be bradycardiac with HR in the 30's during office vist. She reported symptoms of lightheadedness tunnel vision and feeling like she was going to pass out. Upon arrival to ED EKG showed third degree AV block, rate 36, prolonged QRS and RSR pattern which was seen previously on EKG from 5/15/2015, no ST elevation or depression, normal T waves. She subsequently underwent implantation of Medtronic dual chamber PPM on 4/30/19. Device integration on She then underwent revision of her RV lead on 9/20/19 to seat the lead in a better position with more slack to increase battery life of PPM. She underwent atrial fibrillation ablation on 1/13/2020. Postoperatively she was hypotensive and found to have a moderate pericardial effusion. Her BP was stable at d/c around 110's/70's. Her BP medications were stopped, along with metoprolol/flecainide. Limited Echo on 1/21/2020 showed mildly increased pericardial effusion and Ejection fraction estimated to be 55-60%. She underwent repeat Echo on 2/5/2020 and it showed decrease in LVEF of 25-30 %. She was stared on Lisinopril 2.5 mg and Toprol XL 12.5 mg daily on 2/13/2020 for anti-remodeling. Repeat Echo on 9/16/2020 showed improvement in her LVEF 50-55% . Interval History: Today, she presents to office for follow up for management of her atrial fibrillation. EKG today shows SR. She is compliant with her medications and tolerating them well.  She denies chest pain/pressure, tightness, edema, shortness of breath, heart racing, palpitations, lightheadedness, dizziness, syncope, presyncope,  PND or orthopnea. 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 Angela Gil  6/08    ORIF right;  both fractured    PACEMAKER PLACEMENT  05/08/2019    SOLIS AND BSO         Allergies: Allergies   Allergen Reactions    Pollen Extract     Tape Marilyn [place] Tape] Itching       Medication:   Prior to Admission medications    Medication Sig Start Date End Date Taking?  Authorizing Provider   metFORMIN (GLUCOPHAGE-XR) 500 MG extended release tablet TAKE 1 TABLET DAILY: 8/21/20  Yes Sindy Padilla MD   colchicine (COLCRYS) 0.6 MG tablet TAKE 1 TABLET BY MOUTH EVERY DAY 8/7/20  Yes Michelle Madrid MD   simvastatin (ZOCOR) 20 MG tablet TAKE 1 TABLET BY MOUTH EVERY DAY AT NIGHT 5/27/20  Yes Sindy Padilla MD   escitalopram (LEXAPRO) 10 MG tablet TAKE 1 TABLET BY MOUTH DAILY 5/27/20  Yes Sindy Padilla MD   lisinopril (PRINIVIL;ZESTRIL) 2.5 MG tablet Take 1 tablet by mouth daily 2/13/20  Yes Michelle Madrid MD   metoprolol succinate (TOPROL XL) 25 MG extended release tablet Take 0.5 tablets hematemesis, melena, nausea/vomiting or swallowing difficulty/pain  · Genito-Urinary ROS: negative for - dysuria or incontinence  · Musculoskeletal ROS: negative for - joint swelling or muscle pain  · Neurological ROS: negative for - confusion, dizziness, gait disturbance, headaches, numbness/tingling, seizures, speech problems, tremors, visual changes or weakness  · Dermatological ROS: negative for - rash    Physical Examination:  Vitals:    09/30/20 0931   BP: 116/78   Pulse: 79   Temp: 97.5 °F (36.4 °C)   SpO2: 97%       · Constitutional: Oriented. No distress. · Head: Normocephalic and atraumatic. · Mouth/Throat: Oropharynx is clear and moist.   · Eyes: Conjunctivae normal. EOM are normal.   · Neck: Normal range of motion. Neck supple. No rigidity. No JVD present. · Cardiovascular: Normal rate, regular rhythm, S1&S2 and intact distal pulses. · Pulmonary/Chest: Bilateral respiratory sounds. No wheezes. No rhonchi. · Abdominal: Soft. Bowel sounds present. No distension, No tenderness. · Musculoskeletal: No tenderness. No edema    · Lymphadenopathy: Has no cervical adenopathy. · Neurological: Alert and oriented. Cranial nerve appears intact, No Gross deficit   · Skin: Skin is warm and dry. No rash noted. · Psychiatric: Has a normal mood, affect and behavior     Labs:  Reviewed. ECG: reviewed, Sinus  rhythm with paced ventricular complexes that track sinus rhythm with v-rate of 76 bpm.     Studies:   1. Event monitor:   None on file. 2. Echo 9/16/2020  Summary  Normal LV size with assymetric septal contraction due to RV pacing. EF 50-55%  Mild mitral regurgitation is present. The left atrium is normal in size. Normal right ventricular size and function. Pacer / ICD wire is visualized in the right ventricle. Trivial tricuspid regurgitation. No pericardial effusion noted. Limited Echo 2/5/2020  Summary  Normal left ventricular size and wall thickness.   Globally reduced left ventricular systolic function with an estimated ejection fraction of 25-30%. Abnormal septal motion with hypokinesia  Indeterminate diastolic function. The left atrium appears dilated. Anterior pericardial fluid small  Pacer / ICD wire is visualized in the right ventricle. Right ventricular systolic function appears reduced  Mild to moderate mitral regurgitation. Very mild systolic flow reversal seen in the pulmonary views. Mild pulmonic and tricuspid regurgitation. Estimated pulmonary artery systolic pressure is 25 mmHg assuming a right atrial pressure of 3 mmHg. Echo: 1/21/2020   Summary  Left ventricular cavity size is normal.  Normal left ventricular wall thickness. Ejection fraction is visually estimated to be 55-60%. No regional wall motion abnormalities are noted. Moderate to large circumferential pericardial effusion. RA collapse . Compared to the limited echo performed on 1/14/2020, the pericardial effusion appears mildly increased. 3. Stress Test:    None on file    4. Cath:   None on file      I independently reviewed the ECG, MCOT, echocardiogram, stress test, and coronary angiography/PCI results and used them for my plan of care. Procedures:  1. Implantation of Medtronic dual chamber PPM on 4/30/19. 2. Revision of RA lead on 9/20/19. 3. Electrophysiology study with radiofrequency ablation of atrial fibrillation and pulmonary vein isolation 1/13/2020. Assessment/Plan:     Complete heart block   -s/p Implantation of Medtronic dual chamber PPM 4/30/19 and revision of RA lead on 9/20/19.   -RV lead was not seated in the ideal position, which could have been indicative of a micro-dislodgement, and it was using more battery power to pace she subsequently underwent revision of her RA lead on 9/20/19.  -Interrogation of Medtronic dual chamber PPM: normal PPM functioning. Please refer to the PPM interrogation from [employer] on 10/9/19.  Essentially shows that the RV lead has once again increased in pacing capture threshold from 0.75V @ 0.4ms to now 1.75V @ 0.4ms. Given that this has occurred twice, we are suspicious that the patient has some biologic reaction to the Rv lead implantation that causes perhaps increased fibrosis leading to increased pacing capture threshold (PCT). At this time, given that the current PCT is not prohibitively high, would recommend that we continue a watch-wait-see approach. Interrogation of Medtronic dual PPM: normal PPM functioning. Please refer to the PPM interrogation from [employer] on 9/30/2020. Atrial fibrillation, paroxysmal  EKG today electronic ventricular pacemaker, underlying rhythm is SR. She has a [id number] Score 5 (HTN, AGE, DM, GENDER). Currently on Eliquis 5 mg BID for  thromboembolic risk reduction. Tolerating well no signs or symptoms of abnormal bruising or bleeding. She is current in the \" doughnut hole\" with her medications and Eliquis is unaffordable. Stop Eliquis today and start Pradaxa 150 mg BID. Co-pay card given during today's visit. S/p electrophysiology study with radiofrequency ablation of atrial fibrillation and pulmonary vein isolation 1/13/2020. Postoperatively she was hypotensive and found to have a moderate pericardial effusion. Her BP was stable at d/c around 110's/70's. Her BP medications were stopped, along with metoprolol/flecainide. Limited Echo on 1/21/2020 showed mildly increased pericardial effusion and Ejection fraction estimated to be 55-60%. She underwent repeat Echo on 2/5/2020 and it showed decrease in LVEF of 25-30 %. She was stared on Lisinopril 2.5 mg and Toprol XL 12.5 mg daily on 2/13/2020 for anti-remodeling. Educated that her Toprol XL can cause fatigue. She will continue on the lisinopril, Toprol XL life long. Repeat Echo on 9/16/2020 showed improvement in LVEF 50-55%  Device interrogation today showed 0% atrial fibrillation burden since ablation 1/13/2020.     Follow up in one year with [facility], CNP.  Continue remote home monitoring of device every three month. Schedule an appointment to establish care with general cardiology, Dr. Ariana Lepe. MD      Thank you for allowing me to participate in the care of [address] [place] [address]. All questions and concerns were addressed to the patient/family. Alternatives to my treatment were discussed. This note was scribed in the presence of Dr. Galindo Martinez MD by Gloria Pantoja RN. The scribe's documentation has been prepared under my direction and personally reviewed by me in its entirety. I confirm that the note above accurately reflects all work, physical examination, the discussion of treatments and procedures, and medical decision making performed by me.     Galindo Martinez MD, MS, [facility], [facility]  Cardiac Electrophysiology  [address]  [address] [facility], [address] [place] [address]  [facility] [address]  [phone]

## 2020-10-02 NOTE — PROGRESS NOTES
Device check completed by MDT rep Pola Sloop  \"Underlying [email or web address] 94.6% paced\"  no other episodes

## 2020-10-20 ENCOUNTER — TELEPHONE (OUTPATIENT)
Dept: CARDIOLOGY CLINIC | Age: 75
End: 2020-10-20

## 2020-10-20 NOTE — TELEPHONE ENCOUNTER
Colchicine should be prescribed for a total of 3 months after the ablation. If the ablation was 1/13/2020, she should continue through 4/13/2020. This is now 10/20/2020, and therefore, she should discontinue the colchicine.

## 2020-10-20 NOTE — TELEPHONE ENCOUNTER
Yovana Jarvis called in this afternoon stating that her PCP Dr. Reshma Lovell wanted her to call Dr. Tuyet Harrell and see if she still needs to be on Colchicine since she isn't having any fluid around her heart.        You can reach Yovana Jarvis at #[phone]

## 2020-10-20 NOTE — TELEPHONE ENCOUNTER
Dr. Marianna Horta, please advise if okay for patient to discontinue colchicine? S/p Afib ablation and pulmonary vein isolation 1/13/2020. Postoperatively found to have a moderate pericardial effusion. Last echo 9/16/20 showed no pericardial effusion, and improved EF.

## 2020-10-30 ENCOUNTER — TELEPHONE (OUTPATIENT)
Dept: CARDIOLOGY CLINIC | Age: 75
End: 2020-10-30

## 2020-10-30 NOTE — TELEPHONE ENCOUNTER
Jm Mendez from [facility] [employer] called in this afternoon stating that Annelise Hinojosa is on medicaid and pradaxa is not covered under her insurance. Annelise Hinojosa is wanting to be put back on the Eliquis.      Jm Mendez can be reached at [phone]

## 2020-11-04 ENCOUNTER — TELEPHONE (OUTPATIENT)
Dept: INTERNAL MEDICINE CLINIC | Age: 75
End: 2020-11-04

## 2020-11-04 RX ORDER — BENZONATATE 100 MG/1
100 CAPSULE ORAL 3 TIMES DAILY PRN
Qty: 30 CAPSULE | Refills: 0 | Status: SHIPPED | OUTPATIENT
Start: 2020-11-04 | End: 2020-12-10 | Stop reason: SDUPTHER

## 2020-11-04 NOTE — TELEPHONE ENCOUNTER
I recommend cough drops and warm tea with honey to help cough. Delsym or Mucinex DM are best otc options. If still coughing, can fill Benzonatate but these need to be kept out of reach of children because can be toxic to kids and could cause death in a toddler if accidentally taken.

## 2020-11-04 NOTE — TELEPHONE ENCOUNTER
Pt called states she has a really bad dry cough. Taking OTC medication w/o relief   Started last week worst last night. Temp 97.6  This morning      [facility] [employer] [facility] [address].

## 2020-11-18 RX ORDER — ESCITALOPRAM OXALATE 10 MG/1
TABLET ORAL
Qty: 90 TABLET | Refills: 1 | Status: SHIPPED | OUTPATIENT
Start: 2020-11-18 | End: 2021-11-18

## 2020-11-18 RX ORDER — SIMVASTATIN 20 MG
TABLET ORAL
Qty: 90 TABLET | Refills: 1 | Status: SHIPPED | OUTPATIENT
Start: 2020-11-18 | End: 2021-03-19 | Stop reason: SDUPTHER

## 2020-11-18 RX ORDER — BUPROPION HYDROCHLORIDE 150 MG/1
TABLET ORAL
Qty: 90 TABLET | Refills: 3 | Status: SHIPPED | OUTPATIENT
Start: 2020-11-18 | End: 2021-12-30

## 2020-11-18 RX ORDER — GABAPENTIN 300 MG/1
CAPSULE ORAL
Qty: 270 CAPSULE | Refills: 1 | Status: SHIPPED | OUTPATIENT
Start: 2020-11-18 | End: 2021-03-19 | Stop reason: SDUPTHER

## 2020-11-18 RX ORDER — METOPROLOL SUCCINATE 25 MG/1
TABLET, EXTENDED RELEASE ORAL
Qty: 45 TABLET | Refills: 3 | Status: SHIPPED | OUTPATIENT
Start: 2020-11-18 | End: 2022-02-07

## 2020-12-10 ENCOUNTER — TELEPHONE (OUTPATIENT)
Dept: INTERNAL MEDICINE CLINIC | Age: 75
End: 2020-12-10

## 2020-12-10 RX ORDER — BENZONATATE 100 MG/1
100 CAPSULE ORAL 3 TIMES DAILY PRN
Qty: 30 CAPSULE | Refills: 0 | Status: SHIPPED | OUTPATIENT
Start: 2020-12-10 | End: 2021-01-26 | Stop reason: SDUPTHER

## 2020-12-10 NOTE — TELEPHONE ENCOUNTER
Pt called states she has had a dry cough for past month. She has taken OTC delsym and it is not working. Requesting a script for cough medicationr. She said a script was called in earlier but she did not fill was trying OTC first which did not work.        [facility] [employer]

## 2020-12-29 ENCOUNTER — NURSE ONLY (OUTPATIENT)
Dept: CARDIOLOGY CLINIC | Age: 75
End: 2020-12-29
Payer: MEDICARE

## 2020-12-29 PROCEDURE — 93294 REM INTERROG EVL PM/LDLS PM: CPT | Performed by: INTERNAL MEDICINE

## 2020-12-29 PROCEDURE — 93296 REM INTERROG EVL PM/IDS: CPT | Performed by: INTERNAL MEDICINE

## 2020-12-29 NOTE — PROGRESS NOTES
We received remote transmission from patient's monitor at home. Transmission shows normal sensing and pacing function. EP physician will review. See interrogation under cardiology tab in the [address] [facility] [address] field for more details.

## 2020-12-29 NOTE — LETTER
[address] [facility] [phone]  [address] [phone]    Pacemaker/Defibrillator Clinic          12/29/20        [address] [facility]  Neel[address] [address]        Dear [address] [place] [address]    This letter is to inform you that we received the transmission from your monitor at home that checks your implanted heart device. The next date your monitor will automatically transmit will be 3-31-21. If your report needs attention we will notify you. Your device and monitor are wireless and most transmit cellularly, but please periodically check your monitor is still plugged in to the electrical outlet. If you still use the telephone land line to send please ensure the connection to the phone [email or web address] is secure. This will help to ensure successful automatic transmissions in the future. Also, the monitor needs to be close to you while sleeping at night. Please be aware that the remote device transmission sites are periodically monitored only during regular business hours during which simultaneous in-office device clinics are being run. If your transmission requires attention, we will contact you as soon as possible. Thank you.             Stepan Proctor

## 2021-01-26 ENCOUNTER — TELEPHONE (OUTPATIENT)
Dept: INTERNAL MEDICINE CLINIC | Age: 76
End: 2021-01-26

## 2021-01-26 DIAGNOSIS — R05.9 COUGH: ICD-10-CM

## 2021-01-26 RX ORDER — BENZONATATE 100 MG/1
100 CAPSULE ORAL 3 TIMES DAILY PRN
Qty: 30 CAPSULE | Refills: 0 | Status: SHIPPED | OUTPATIENT
Start: 2021-01-26 | End: 2021-03-15 | Stop reason: SDUPTHER

## 2021-01-26 RX ORDER — LOSARTAN POTASSIUM 25 MG/1
25 TABLET ORAL DAILY
Qty: 30 TABLET | Refills: 5 | Status: SHIPPED | OUTPATIENT
Start: 2021-01-26 | End: 2021-05-24

## 2021-01-26 NOTE — TELEPHONE ENCOUNTER
Patient requests refill for benzonatate (TESSALON) 100 MG capsule or something else that's more effective. She still has the cough and she is unsure if it may be from the lisinopril, since this can cause a dry cough. [facility] [employer] [facility] [address].

## 2021-01-26 NOTE — TELEPHONE ENCOUNTER
Stop lisinopril and change back to losartan but will put on lower dose than in the past.    Will refill benzonatate once more but needs to be seen if cough is not better soon.

## 2021-03-15 DIAGNOSIS — R05.9 COUGH: ICD-10-CM

## 2021-03-15 RX ORDER — BENZONATATE 100 MG/1
100 CAPSULE ORAL 3 TIMES DAILY PRN
Qty: 30 CAPSULE | Refills: 0 | Status: SHIPPED | OUTPATIENT
Start: 2021-03-15 | End: 2021-03-19 | Stop reason: SDUPTHER

## 2021-03-19 ENCOUNTER — OFFICE VISIT (OUTPATIENT)
Dept: INTERNAL MEDICINE CLINIC | Age: 76
End: 2021-03-19
Payer: MEDICARE

## 2021-03-19 VITALS
HEART RATE: 79 BPM | SYSTOLIC BLOOD PRESSURE: 114 MMHG | BODY MASS INDEX: 37.09 KG/M2 | TEMPERATURE: 97.2 F | HEIGHT: 65 IN | DIASTOLIC BLOOD PRESSURE: 68 MMHG | WEIGHT: 222.6 LBS

## 2021-03-19 DIAGNOSIS — R05.9 COUGH: ICD-10-CM

## 2021-03-19 DIAGNOSIS — E55.9 VITAMIN D DEFICIENCY: ICD-10-CM

## 2021-03-19 DIAGNOSIS — N18.30 STAGE 3 CHRONIC KIDNEY DISEASE, UNSPECIFIED WHETHER STAGE 3A OR 3B CKD (HCC): ICD-10-CM

## 2021-03-19 DIAGNOSIS — E78.2 MIXED HYPERLIPIDEMIA: ICD-10-CM

## 2021-03-19 DIAGNOSIS — E79.0 HYPERURICEMIA: ICD-10-CM

## 2021-03-19 DIAGNOSIS — M79.10 MYALGIA: ICD-10-CM

## 2021-03-19 DIAGNOSIS — R73.02 IMPAIRED GLUCOSE TOLERANCE: ICD-10-CM

## 2021-03-19 DIAGNOSIS — K21.9 GASTROESOPHAGEAL REFLUX DISEASE, UNSPECIFIED WHETHER ESOPHAGITIS PRESENT: ICD-10-CM

## 2021-03-19 DIAGNOSIS — Z11.59 ENCOUNTER FOR HEPATITIS C SCREENING TEST FOR LOW RISK PATIENT: ICD-10-CM

## 2021-03-19 DIAGNOSIS — R53.83 FATIGUE, UNSPECIFIED TYPE: Primary | ICD-10-CM

## 2021-03-19 LAB
BASOPHILS ABSOLUTE: 0.1 K/UL (ref 0–0.2)
BASOPHILS RELATIVE PERCENT: 1.2 %
EOSINOPHILS ABSOLUTE: 0.2 K/UL (ref 0–0.6)
EOSINOPHILS RELATIVE PERCENT: 1.8 %
HCT VFR BLD CALC: 47.9 % (ref 36–48)
HEMOGLOBIN: 16 G/DL (ref 12–16)
HEPATITIS C ANTIBODY INTERPRETATION: NORMAL
LYMPHOCYTES ABSOLUTE: 2 K/UL (ref 1–5.1)
LYMPHOCYTES RELATIVE PERCENT: 21.4 %
MCH RBC QN AUTO: 30 PG (ref 26–34)
MCHC RBC AUTO-ENTMCNC: 33.4 G/DL (ref 31–36)
MCV RBC AUTO: 89.9 FL (ref 80–100)
MONOCYTES ABSOLUTE: 0.9 K/UL (ref 0–1.3)
MONOCYTES RELATIVE PERCENT: 9.3 %
NEUTROPHILS ABSOLUTE: 6.3 K/UL (ref 1.7–7.7)
NEUTROPHILS RELATIVE PERCENT: 66.3 %
PDW BLD-RTO: 13.9 % (ref 12.4–15.4)
PLATELET # BLD: 307 K/UL (ref 135–450)
PMV BLD AUTO: 7.8 FL (ref 5–10.5)
RBC # BLD: 5.33 M/UL (ref 4–5.2)
VITAMIN B-12: 1056 PG/ML (ref 211–911)
VITAMIN D 25-HYDROXY: 11.9 NG/ML
WBC # BLD: 9.4 K/UL (ref 4–11)

## 2021-03-19 PROCEDURE — 1123F ACP DISCUSS/DSCN MKR DOCD: CPT | Performed by: FAMILY MEDICINE

## 2021-03-19 PROCEDURE — G8417 CALC BMI ABV UP PARAM F/U: HCPCS | Performed by: FAMILY MEDICINE

## 2021-03-19 PROCEDURE — G8484 FLU IMMUNIZE NO ADMIN: HCPCS | Performed by: FAMILY MEDICINE

## 2021-03-19 PROCEDURE — 99214 OFFICE O/P EST MOD 30 MIN: CPT | Performed by: FAMILY MEDICINE

## 2021-03-19 PROCEDURE — G8427 DOCREV CUR MEDS BY ELIG CLIN: HCPCS | Performed by: FAMILY MEDICINE

## 2021-03-19 PROCEDURE — 1036F TOBACCO NON-USER: CPT | Performed by: FAMILY MEDICINE

## 2021-03-19 PROCEDURE — 4040F PNEUMOC VAC/ADMIN/RCVD: CPT | Performed by: FAMILY MEDICINE

## 2021-03-19 PROCEDURE — G8399 PT W/DXA RESULTS DOCUMENT: HCPCS | Performed by: FAMILY MEDICINE

## 2021-03-19 PROCEDURE — 1090F PRES/ABSN URINE INCON ASSESS: CPT | Performed by: FAMILY MEDICINE

## 2021-03-19 RX ORDER — BENZONATATE 100 MG/1
100 CAPSULE ORAL 3 TIMES DAILY PRN
Qty: 30 CAPSULE | Refills: 5 | Status: SHIPPED | OUTPATIENT
Start: 2021-03-19 | End: 2021-03-29

## 2021-03-19 RX ORDER — SIMVASTATIN 20 MG
TABLET ORAL
Qty: 90 TABLET | Refills: 1 | Status: SHIPPED | OUTPATIENT
Start: 2021-03-19 | End: 2021-07-21 | Stop reason: SDUPTHER

## 2021-03-19 RX ORDER — GABAPENTIN 300 MG/1
CAPSULE ORAL
Qty: 270 CAPSULE | Refills: 1 | Status: SHIPPED | OUTPATIENT
Start: 2021-03-19 | End: 2022-04-13

## 2021-03-19 RX ORDER — METFORMIN HYDROCHLORIDE 500 MG/1
TABLET, EXTENDED RELEASE ORAL
Qty: 90 TABLET | Refills: 1 | Status: SHIPPED | OUTPATIENT
Start: 2021-03-19 | End: 2021-11-18

## 2021-03-19 ASSESSMENT — PATIENT HEALTH QUESTIONNAIRE - PHQ9: SUM OF ALL RESPONSES TO PHQ QUESTIONS 1-9: 1

## 2021-03-19 NOTE — PATIENT INSTRUCTIONS
If the blood tests are all good and do not give a reason to be tired, go back on CPAP and see how you feel. Tell him to look at labs and comments on your blood tests in about a week. Web sites that end in dot org's are the best places to get valid medical information. Dot gov is also very valid.

## 2021-03-19 NOTE — PROGRESS NOTES
Subjective:      Patient ID: Sheila Hernandez is a 66 y.o. female. HPI   Chief Complaint   Patient presents with    Follow-up     Pt requests tessalon pearls for ongoing cough. FU of IGT, mixed HLP, GERD, HTN, CKD3  Intermittent A fib. Doing OK. Got her first COVID vaccine. She is tired all the time. Thinks it may be life. Other than this fatigue, feeling fine and no other c/o    Did eat this morning and noon but nothing in past 2 hours. See Assessment for more detail on conditions addressed today.     Patient Active Problem List   Diagnosis    Osteoarthritis    GERD (gastroesophageal reflux disease)    Mixed hyperlipidemia    TAZ on CPAP    Impaired glucose tolerance    Hypertension    Generalized anxiety disorder    Lipoma of skin    Myalgia    Chronic kidney disease (CKD), stage III (moderate) (HCC)    Periodic limb movement disorder    Obesity, Class II, BMI 35-39.9    Lipoma    Ganglion of wrist    Hyperuricemia    Pacemaker    Complete heart block (HCC)    Intermittent atrial fibrillation ([address]    Atrial fibrillation ([address]    Paroxysmal atrial fibrillation ([address]         Outpatient Medications Marked as Taking for the 3/19/21 encounter (Office Visit) with Lew Cervantes MD   Medication Sig Dispense Refill    ELIQUIS 5 MG TABS tablet TAKE 1 TABLET BY MOUTH TWICE A DAY 60 tablet 5    benzonatate (TESSALON) 100 MG capsule Take 1 capsule by mouth 3 times daily as needed for Cough 30 capsule 0    losartan (COZAAR) 25 MG tablet Take 1 tablet by mouth daily 30 tablet 5    gabapentin (NEURONTIN) 300 MG capsule TAKE 1 CAPSULE BY MOUTH THREE TIMES A  capsule 1    buPROPion (WELLBUTRIN XL) 150 MG extended release tablet TAKE 1 TABLET BY MOUTH EVERY DAY 90 tablet 3    metoprolol succinate (TOPROL XL) 25 MG extended release tablet TAKE 1/2 TABLET BY MOUTH EVERY DAY 45 tablet 3    simvastatin (ZOCOR) 20 MG tablet TAKE 1 TABLET BY MOUTH EVERY DAY AT NIGHT 90 tablet 1    escitalopram (LEXAPRO) 10 MG tablet TAKE 1 TABLET BY MOUTH EVERY DAY 90 tablet 1    metFORMIN (GLUCOPHAGE-XR) 500 MG extended release tablet TAKE 1 TABLET DAILY: 90 tablet 1    diphenhydrAMINE (BENADRYL) 25 MG capsule Take 25 mg by mouth nightly as needed for Itching      omeprazole (PRILOSEC) 20 MG capsule Take 20 mg by mouth daily as needed       cetirizine (ZYRTEC) 10 MG tablet Take 10 mg by mouth daily as needed          Social History     Tobacco Use    Smoking status: Never Smoker    Smokeless tobacco: Never Used   Substance Use Topics    Alcohol use: Yes     Comment: rarely    Drug use: Not on file         Review of Systems   Constitutional: Positive for fatigue. Respiratory: Negative for cough, chest tightness, shortness of breath and wheezing. Cardiovascular: Negative for chest pain, palpitations and leg swelling. Gastrointestinal: Negative for abdominal pain. Endocrine: Negative for cold intolerance and heat intolerance. Skin: Negative for rash and wound. Neurological: Negative for dizziness, syncope, facial asymmetry, speech difficulty, weakness, numbness and headaches. Objective:   Physical Exam  Vitals signs reviewed. Constitutional:       General: She is not in acute distress. Appearance: Normal appearance. She is well-developed. She is not diaphoretic. Eyes:      General: No scleral icterus. Neck:      Musculoskeletal: Neck supple. Thyroid: No thyroid mass or thyromegaly. Vascular: No carotid bruit. Cardiovascular:      Rate and Rhythm: Normal rate and regular rhythm. No extrasystoles are present. Heart sounds: S1 normal and S2 normal. Heart sounds are distant. No murmur. No friction rub. Pulmonary:      Effort: Pulmonary effort is normal. No respiratory distress. Breath sounds: Normal breath sounds. No decreased breath sounds, wheezing, rhonchi or rales. Abdominal:      General: Bowel sounds are normal. There is no abdominal bruit. Hydroxy    8. Mixed hyperlipidemia  Lab Results   Component Value Date    LDLCALC 107 (H) 09/23/2020     Consider med change or dose increase next lab but encouraged to be more active. - simvastatin (ZOCOR) 20 MG tablet; TAKE 1 TABLET BY MOUTH EVERY DAY AT NIGHT  Dispense: 90 tablet; Refill: 1    9. Encounter for hepatitis C screening test for low risk patient  - Hepatitis C Antibody    10. Myalgia (chronic)  - gabapentin (NEURONTIN) 300 MG capsule; TAKE 1 CAPSULE BY MOUTH THREE TIMES A DAY  Dispense: 270 capsule; Refill: 1          Plan:      See orders. See after visit summary, patient instructions, and reference hand-outs. A PRINTED SUMMARY = AVS GIVEN TO THE PATIENT, (or if Virtual Visit, patient told it is available in My Chart or will be mailed if not active on My Chart.)    Discussed use, benefit, and side effects of prescribed medications. Barriers to medication compliance addressed. All patient questions answered.           Jalene Brittle, MD

## 2021-03-20 DIAGNOSIS — E55.9 VITAMIN D DEFICIENCY: Primary | ICD-10-CM

## 2021-03-20 LAB
A/G RATIO: 1.4 (ref 1.1–2.2)
ALBUMIN SERPL-MCNC: 4.3 G/DL (ref 3.4–5)
ALP BLD-CCNC: 83 U/L (ref 40–129)
ALT SERPL-CCNC: 8 U/L (ref 10–40)
ANION GAP SERPL CALCULATED.3IONS-SCNC: 13 MMOL/L (ref 3–16)
AST SERPL-CCNC: 12 U/L (ref 15–37)
BILIRUB SERPL-MCNC: 0.3 MG/DL (ref 0–1)
BUN BLDV-MCNC: 14 MG/DL (ref 7–20)
CALCIUM SERPL-MCNC: 9.3 MG/DL (ref 8.3–10.6)
CHLORIDE BLD-SCNC: 101 MMOL/L (ref 99–110)
CO2: 25 MMOL/L (ref 21–32)
CREAT SERPL-MCNC: 1.2 MG/DL (ref 0.6–1.2)
ESTIMATED AVERAGE GLUCOSE: 119.8 MG/DL
GFR AFRICAN AMERICAN: 53
GFR NON-AFRICAN AMERICAN: 44
GLOBULIN: 3.1 G/DL
GLUCOSE BLD-MCNC: 117 MG/DL (ref 70–99)
HBA1C MFR BLD: 5.8 %
MAGNESIUM: 2.2 MG/DL (ref 1.8–2.4)
POTASSIUM SERPL-SCNC: 4.7 MMOL/L (ref 3.5–5.1)
SODIUM BLD-SCNC: 139 MMOL/L (ref 136–145)
TOTAL PROTEIN: 7.4 G/DL (ref 6.4–8.2)
TSH REFLEX: 0.81 UIU/ML (ref 0.27–4.2)

## 2021-03-20 RX ORDER — ERGOCALCIFEROL (VITAMIN D2) 1250 MCG
50000 CAPSULE ORAL WEEKLY
Qty: 12 CAPSULE | Refills: 3 | Status: SHIPPED | OUTPATIENT
Start: 2021-03-20 | End: 2022-03-09 | Stop reason: SDUPTHER

## 2021-03-21 ASSESSMENT — ENCOUNTER SYMPTOMS
COUGH: 0
ABDOMINAL PAIN: 0
SHORTNESS OF BREATH: 0
CHEST TIGHTNESS: 0
WHEEZING: 0

## 2021-03-30 ENCOUNTER — VIRTUAL VISIT (OUTPATIENT)
Dept: INTERNAL MEDICINE CLINIC | Age: 76
End: 2021-03-30
Payer: MEDICARE

## 2021-03-30 DIAGNOSIS — R41.3 POOR MEMORY: ICD-10-CM

## 2021-03-30 DIAGNOSIS — Z00.00 ROUTINE GENERAL MEDICAL EXAMINATION AT A HEALTH CARE FACILITY: Primary | ICD-10-CM

## 2021-03-30 DIAGNOSIS — Z71.89 ACP (ADVANCE CARE PLANNING): ICD-10-CM

## 2021-03-30 DIAGNOSIS — Z12.31 ENCOUNTER FOR SCREENING MAMMOGRAM FOR BREAST CANCER: ICD-10-CM

## 2021-03-30 DIAGNOSIS — Z80.0 FH: COLON CANCER: ICD-10-CM

## 2021-03-30 PROBLEM — I44.2 COMPLETE HEART BLOCK (HCC): Status: RESOLVED | Noted: 2019-05-06 | Resolved: 2021-03-30

## 2021-03-30 PROBLEM — I48.0 PAROXYSMAL ATRIAL FIBRILLATION (HCC): Status: RESOLVED | Noted: 2020-01-13 | Resolved: 2021-03-30

## 2021-03-30 PROCEDURE — 1123F ACP DISCUSS/DSCN MKR DOCD: CPT | Performed by: FAMILY MEDICINE

## 2021-03-30 PROCEDURE — G8484 FLU IMMUNIZE NO ADMIN: HCPCS | Performed by: FAMILY MEDICINE

## 2021-03-30 PROCEDURE — 4040F PNEUMOC VAC/ADMIN/RCVD: CPT | Performed by: FAMILY MEDICINE

## 2021-03-30 PROCEDURE — G0439 PPPS, SUBSEQ VISIT: HCPCS | Performed by: FAMILY MEDICINE

## 2021-03-30 ASSESSMENT — LIFESTYLE VARIABLES: HOW OFTEN DO YOU HAVE A DRINK CONTAINING ALCOHOL: 0

## 2021-03-30 ASSESSMENT — PATIENT HEALTH QUESTIONNAIRE - PHQ9
SUM OF ALL RESPONSES TO PHQ QUESTIONS 1-9: 0
SUM OF ALL RESPONSES TO PHQ QUESTIONS 1-9: 0

## 2021-03-30 NOTE — PROGRESS NOTES
Medicare Annual Wellness Visit  Are Name: Dave Kramer Date: 3/30/2021   MRN: [id number] Sex: Female   Age: 66 y.o. Ethnicity: Non-/Non    : 1945 Race: Norma Rodriguez is here for Medicare AWV    Screenings for behavioral, psychosocial and functional/safety risks, and cognitive dysfunction are all negative except as indicated below. These results, as well as other patient data from the [address] form, are documented in Flowsheets linked to this Encounter. Allergies   Allergen Reactions    Pollen Extract     Tape Andres Luna Tape] Itching       Prior to Visit Medications    Medication Sig Taking?  Authorizing Provider   ergocalciferol (DRISDOL) 1.25 MG (20134 UT) capsule Take 1 capsule by mouth once a week Yes Jalene Brittle, MD   metFORMIN (GLUCOPHAGE-XR) 500 MG extended release tablet TAKE 1 TABLET DAILY: Yes Jalene Brittle, MD   simvastatin (ZOCOR) 20 MG tablet TAKE 1 TABLET BY MOUTH EVERY DAY AT NIGHT Yes Jalene Brittle, MD   gabapentin (NEURONTIN) 300 MG capsule TAKE 1 CAPSULE BY MOUTH THREE TIMES A DAY Yes Jalene Brittle, MD   ELIQUIS 5 MG TABS tablet TAKE 1 TABLET BY MOUTH TWICE A DAY Yes LILY Hardy CNP   losartan (COZAAR) 25 MG tablet Take 1 tablet by mouth daily Yes Jalene Brittle, MD   buPROPion (WELLBUTRIN XL) 150 MG extended release tablet TAKE 1 TABLET BY MOUTH EVERY DAY Yes Jalene Brittle, MD   metoprolol succinate (TOPROL XL) 25 MG extended release tablet TAKE 1/2 TABLET BY MOUTH EVERY DAY Yes Francie Shepard MD   escitalopram (LEXAPRO) 10 MG tablet TAKE 1 TABLET BY MOUTH EVERY DAY Yes Jalene Brittle, MD   diphenhydrAMINE (BENADRYL) 25 MG capsule Take 25 mg by mouth nightly as needed for Itching Yes Historical Provider, MD   omeprazole (PRILOSEC) 20 MG capsule Take 20 mg by mouth daily as needed  Yes Historical Provider, MD   cetirizine (ZYRTEC) 10 MG tablet Take 10 mg by mouth daily as needed  Yes Historical Provider, MD       Past Medical History:   Diagnosis Date    Abnormal EKG     RBBB < 2008    Abnormal glucose tolerance test     Cataract     Chronic kidney disease (CKD), stage III (moderate)     Elbow fracture 06/05/2008    Generalized anxiety disorder     GERD (gastroesophageal reflux disease)     Herpes zoster 08/2015    Hypertension     Intermittent atrial fibrillation ([address] 10/21/2019    Lipoma of skin     Mixed hyperlipidemia     Myalgia     Obstructive sleep apnea     Osteoarthritis     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 Mamadou Prieto  6/08    ORIF right;  both fractured    PACEMAKER PLACEMENT  05/08/2019    SOILS AND BSO         Family History   Problem Relation Age of Onset    Hypertension Mother     COPD Mother         gangrene    Cancer Mother         bladder    Colon Cancer Father     Hypertension Son     Elevated Lipids Son     Elevated Lipids Son     Thyroid Disease Maternal Aunt        CareTeam (Including outside providers/suppliers regularly involved in providing care):   Patient Care Team:  Jorge Wisdom MD as PCP - Katie Ely MD as PCP - [facility] Empaneled Provider    Wt Readings from Last 3 Encounters:   03/19/21 222 lb 9.6 oz (101 kg)   09/30/20 223 lb (101.2 kg)   09/23/20 220 lb (99.8 kg)      No flowsheet data found. There is no height or weight on file to calculate BMI. Based upon direct observation of the patient, evaluation of cognition reveals recall not great. 1 out of 3 words. Concentration OK:  Oral backwards. Clock drawing very slow.   Said 6 at bottom and later said 6 at left instead of 9 but when asked, corrected self. Hands for 11:10:  \"Short on 11 and long on 5.\". Took several minutes to figure this out. Patient's complete Health Risk Assessment and screening values have been reviewed and are found in Flowsheets. The following problems were reviewed today and where indicated follow up appointments were made and/or referrals ordered. Positive Risk Factor Screenings with Interventions:          General Health and ACP:  General  In general, how would you say your health is?: Good  In the past 7 days, have you experienced any of the following? New or Increased Pain, New or Increased Fatigue, Loneliness, Social Isolation, Stress or Anger?: None of These  Do you get the social and emotional support that you need?: Yes  Do you have a Living Will?: Yes  Advance Directives     Power of [address] [place] [address] Will ACP-Advance Directive ACP-Power of     Not on File Not on File Not on File Not on File      General Health Risk Interventions:  · asked to get us copy of [address] Habits/Nutrition:  Health Habits/Nutrition  Do you exercise for at least 20 minutes 2-3 times per week?: Yes  Have you lost any weight without trying in the past 3 months?: No  Do you eat only one meal per day?: (!) Yes(grazing through the day.)  Have you seen the dentist within the past year?: Yes     Health Habits/Nutrition Interventions:  · Nutritional issues:  educational materials for healthy, well-balanced diet provided;  Encouraged to graze on healthy foods and not cookies, candy and ice cream which she admits to doing.      Hearing/Vision:  No exam data present  Hearing/Vision  Do you or your family notice any trouble with your hearing that hasn't been managed with hearing aids?: (!) Yes(noticable problem to family)  Do you have difficulty driving, watching TV, or doing any of your daily activities because of your eyesight?: No  Have you had an eye exam within the past year?: (!) No  Hearing/Vision Interventions:  · Hearing concerns:  discussed hearing aide evaluation:  [facility] or [facility] but suggest checking insurance plan for any coverage also. · Vision concerns:  patient encouraged to make appointment with his/her eye specialist;  She says she is too busy with  Andrew's health issues right now. Personalized Preventive Plan   Current Health Maintenance Status  Immunization History   Administered Date(s) Administered    COVID-19, [place], PF, 30mcg/0.3mL 03/03/2021, 03/24/2021    Influenza 09/15/2011, 09/30/2013    Influenza Vaccine, unspecified formulation 09/30/2018    Influenza, High Dose (Fluzone 65 yrs and older) 11/10/2014, 09/07/2016, 10/03/2017, 09/03/2018, 10/01/2019    Influenza, High-dose, Quadv, 65 yrs +, IM (Fluzone) 08/21/2020    Pneumococcal Conjugate 13-valent ([id number]) 03/01/2016, 10/01/2019    Pneumococcal Polysaccharide ([id number]) 01/28/2013    Td, unspecified formulation 06/05/2008    Tdap (Boostrix, Adacel) 03/01/2016, 09/19/2016        Health Maintenance   Topic Date Due    Shingles Vaccine (1 of 2) Never done   [employer] Visit (AWV)  Never done    Lipid screen  09/23/2021    DEXA (modify frequency per FRAX score)  03/15/2022    Potassium monitoring  03/19/2022    Creatinine monitoring  03/19/2022    DTaP/Tdap/Td vaccine (3 - Td) 09/19/2026    Flu vaccine  Completed    Pneumococcal 65+ years Vaccine  Completed    COVID-19 Vaccine  Completed    Hepatitis C screen  Completed    Hepatitis A vaccine  Aged Out    Hepatitis B vaccine  Aged Out    Hib vaccine  Aged Out    Meningococcal (ACWY) vaccine  Aged Out     Recommendations for Filement Due: see orders and patient instructions/AVS.  . Recommended screening schedule for the next 5-10 years is provided to the patient in written form: see Patient Instructions/AVS.    Mesha Garcia was seen today for medicare awv. Diagnoses and all orders for this visit:    1.  Routine general medical examination at a health care facility    2. Encounter for screening mammogram for breast cancer  Updated  chart for age 72-80 as chance for living 10 or more years. Encouraged to get mammogram q o year. Has fatty breasts so not needing every year. - TR DIGITAL SCREEN W OR WO CAD BILATERAL; Future    3. ACP (advance care planning)  Full code at this time. Get office a copy of [facility]. 4. FH: colon cancer  Discussed getting colonoscopy once more. Last 2013. Is 3 years overdue. 5. Poor memory  She thinks memory is OK. Noted some subtle issues on memory screen. Will check further at in person visit. Ezequiel Hewitt is a 66 y.o. female being evaluated by a Virtual Visit (phone) encounter to address concerns as mentioned above. A caregiver was present when appropriate. Due to this being a TeleHealth encounter (During [place]-12 public health emergency), evaluation of the following organ systems was limited: Vitals/Constitutional/EENT/Resp/CV/GI//MS/Neuro/Skin/Heme-Lymph-Imm. Pursuant to the emergency declaration under the [facility], [address] [place] [address] [facility] authority and the [employer] Act, this Virtual Visit was conducted with patient's (and/or legal guardian's) consent, to reduce the patient's risk of exposure to COVID-19 and provide necessary medical care. The patient (and/or legal guardian) has also been advised to contact this office for worsening conditions or problems, and seek emergency medical treatment and/or call 911 if deemed necessary. Patient identification was verified at the start of the visit: Yes    Services were provided through phone to substitute for in-person clinic visit. Patient and provider were located at their individual homes. --Ciaran Reed MD on 3/30/2021 at 11:17 AM    An electronic signature was used to authenticate this note.

## 2021-03-30 NOTE — ASSESSMENT & PLAN NOTE
Had trouble with word recall. 1 out of 3 words. Got clock mostly correct but had to be coached and then had long hand on wrong number for time. Consider SLUMS or other testing.

## 2021-03-30 NOTE — PATIENT INSTRUCTIONS
information. Personalized Preventive Plan for Brice Kehr - 3/30/2021  Medicare offers a range of preventive health benefits. Some of the tests and screenings are paid in full while other may be subject to a deductible, co-insurance, and/or copay. Some of these benefits include a comprehensive review of your medical history including lifestyle, illnesses that may run in your family, and various assessments and screenings as appropriate. After reviewing your medical record and screening and assessments performed today your provider may have ordered immunizations, labs, imaging, and/or referrals for you. A list of these orders (if applicable) as well as your Preventive Care list are included within your After Visit Summary for your review. Other Preventive Recommendations:    · A preventive eye exam performed by an eye specialist is recommended every 1-2 years to screen for glaucoma; cataracts, macular degeneration, and other eye disorders. · A preventive dental visit is recommended every 6 months. · Try to get at least 150 minutes of exercise per week or 10,000 steps per day on a pedometer . · Order or download the FREE \"Exercise & Physical Activity: Your Everyday Guide\" from [employer]. Call 6-821.179.8760 or search [employer] online. · You need 9512-8209 mg of calcium and 8934-9388 IU of vitamin D per day. It is possible to meet your calcium requirement with diet alone, but a vitamin D supplement is usually necessary to meet this goal.  · When exposed to the sun, use a sunscreen that protects against both UVA and UVB radiation with an SPF of 30 or greater. Reapply every 2 to 3 hours or after sweating, drying off with a towel, or swimming. · Always wear a seat belt when traveling in a car. Always wear a helmet when riding a bicycle or motorcycle.     xxxxxxxxxxxxxxxxxxxxxxxxxxxxxxxxxxxxxxxxxxxxxxxxxx    Please get us a copy of your [facility] Will, (DPOA = medical power of  with healthcare decision makers if you are not capable)    xxxxxxxxxxxxxxxxxxxxxxxxxxxxxxxxxxxxxxxxxxxxxxxxxxx    Check your insurance about hearing aides. [facility] is an option unless you have coverage through insurance. You need to schedule an appointment for a hearing test with the place you want to try. xxxxxxxxxxxxxxxxxxxxx[id number]    Check with pharmacy about a shingles vaccine. It is recommended that you get updated on thi.    xxxxxxxxxxxxxxxxxxxxxxxxxxxxxxxxxxxxxxx    Please get a mammogram and colonoscopy soon. This could potentially be your last colonoscopy. I recommend mammogram every other year until you are 78.

## 2021-03-31 ENCOUNTER — NURSE ONLY (OUTPATIENT)
Dept: CARDIOLOGY CLINIC | Age: 76
End: 2021-03-31
Payer: MEDICARE

## 2021-03-31 DIAGNOSIS — Z95.0 PACEMAKER: ICD-10-CM

## 2021-03-31 PROCEDURE — 93294 REM INTERROG EVL PM/LDLS PM: CPT | Performed by: INTERNAL MEDICINE

## 2021-03-31 PROCEDURE — 93296 REM INTERROG EVL PM/IDS: CPT | Performed by: INTERNAL MEDICINE

## 2021-03-31 NOTE — LETTER
[address] [facility] [phone]  [address] [facility] [phone]    Pacemaker/Defibrillator Clinic          03/31/21        [address] [place] [address]  [address] [id number]        Dear [address] [place] [address]    This letter is to inform you that we received the transmission from your monitor at home that checks your implanted heart device. The next date your monitor will automatically transmit will be 7-6-21. If your report needs attention we will notify you. Your device and monitor are wireless and most transmit cellularly, but please periodically check your monitor is still plugged in to the electrical outlet. If you still use the telephone land line to send please ensure the connection to the phone miguelangel is secure. This will help to ensure successful automatic transmissions in the future. Also, the monitor needs to be close to you while sleeping at night. Please be aware that the remote device transmission sites are periodically monitored only during regular business hours during which simultaneous in-office device clinics are being run. If your transmission requires attention, we will contact you as soon as possible. Thank you.             [address]

## 2021-03-31 NOTE — PROGRESS NOTES
We received remote transmission from patient's monitor at home. Transmission shows normal sensing and pacing function. Noted NSVT. Pt is on Toprol. EP physician will review. See interrogation under cardiology tab in the [facility] [address] field for more details.

## 2021-05-05 NOTE — PROGRESS NOTES
Cardiology Consultation     Referring Physician: Dr. 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 female who presents with PMH significant for DM, CKD, TAZ on CPAP, HTN, anxiety, AFIB s/p ablation 2020 with post op pericardial effusion with normal LVEF 55-60% 20 with repeat echo revealing reduction in LVEF 25-30%, medical therapy initiated and repeat echo 20 showed LVEF had recovered 50-55%. Today, she denies any new or recurrent cardiac sounding complaints. Patient denies exertional chest pain/pressure, dyspnea at rest, BREWSTER, PND, orthopnea, palpitations, lightheadedness, weight changes, changes in LE edema, and syncope. She walks the grocery store for exercise only. The entire store without assistance-pushing the cart. She has stairs in her home, full flight. Sore in her legs but denies any BREWSTER, chest pressure or pain. She admits to medical therapy compliance and tolerating. Ache in her thighs bilateral, on gabapentin, no podiatry. Prior cardiac testing:    EK/10/21 Electronic dual-chamber pacemaker  -possibly demand type   Pacemaker ECG, No further analysis     Echo: 20   Normal LV size with assymetric septal contraction due to RV pacing. EF   50-55%   Mild mitral regurgitation is present. The left atrium is normal in size. Normal right ventricular size and function. Pacer / ICD wire is visualized   in the right ventricle. Trivial tricuspid regurgitation. No pericardial effusion noted. Limited echo:20  Normal left ventricular size and wall thickness. Globally reduced left ventricular systolic function with an estimated   ejection fraction of 25-30%.    Abnormal septal motion with hypokinesia Indeterminate diastolic function. The left atrium appears dilated. Anterior pericardial fluid small   Pacer / ICD wire is visualized in the right ventricle. Right ventricular systolic function appears reduced   Mild to moderate mitral regurgitation. Very mild systolic flow reversal seen in the pulmonary views. Mild pulmonic and tricuspid regurgitation. Estimated pulmonary artery systolic pressure is 25 mmHg assuming a right   atrial pressure of 3 mmHg. Limited echo:1/14/20  Moderate pericardial effusion without tamponade   Pacer wire present in RV   Normal LV size with ; EF 40%   The IVC is dilated. (3.4cm)   Dilated IVC with poor inspiration collapse consistent with elevated right   atrial pressure. Estimated right atrial pressure is 15 mmHg. Echo:4/30/19   Left ventricular cavity size is normal. Normal left ventricular wall   thickness. Overall left ventricular systolic function appears normal. Ejection fraction   is visually estimated to be 60%. Mitral annular calcification is present. Mild mitral regurgitation. No evidence of mitral stenosis. Normal right ventricular size and function. Stress Test: no prior hx     Cath: no prior hx       Past Medical History:   has a past medical history of Abnormal EKG, Abnormal glucose tolerance test, Cataract, Chronic kidney disease (CKD), stage III (moderate), COVID-19 virus vaccination contraindicated, Elbow fracture, Generalized anxiety disorder, GERD (gastroesophageal reflux disease), Herpes zoster, Hypertension, Intermittent atrial fibrillation ([address] Lipoma of skin, Mixed hyperlipidemia, Myalgia, Obstructive sleep apnea, Osteoarthritis, Pacemaker lead failure, Pericarditis, Periodic limb movement disorder, Psoriasis, Seasonal allergic rhinitis, and Tubular adenoma of colon. Surgical History:   has a past surgical history that includes Cataract removal (2011); Elbow surgery (6/08); rubio and bso (cervix removed);  Cholecystectomy, laparoscopic (6/11/15); and pacemaker placement (05/08/2019). Social History:   reports that she has never smoked. She has never used smokeless tobacco. She reports current alcohol use. She reports that she does not use drugs. Family History:  family history includes COPD in her mother; Cancer in her mother; Colon Cancer in her father; Elevated Lipids in her son and son; Hypertension in her mother and son; Thyroid Disease in her maternal aunt. Home Medications:  Were reviewed and are listed in nursing record and/or below  Prior to Admission medications    Medication Sig Start Date End Date Taking?  Authorizing Provider   ergocalciferol (DRISDOL) 1.25 MG (72903 UT) capsule Take 1 capsule by mouth once a week 3/20/21  Yes Perry Parr MD   metFORMIN (GLUCOPHAGE-XR) 500 MG extended release tablet TAKE 1 TABLET DAILY: 3/19/21  Yes Perry Parr MD   simvastatin (ZOCOR) 20 MG tablet TAKE 1 TABLET BY MOUTH EVERY DAY AT NIGHT 3/19/21  Yes Perry Parr MD   gabapentin (NEURONTIN) 300 MG capsule TAKE 1 CAPSULE BY MOUTH THREE TIMES A DAY 3/19/21 9/19/21 Yes Perry Parr MD   ELIQUIS 5 MG TABS tablet TAKE 1 TABLET BY MOUTH TWICE A DAY 3/15/21  Yes LILY Hardy CNP   losartan (COZAAR) 25 MG tablet Take 1 tablet by mouth daily 1/26/21  Yes Perry Parr MD   buPROPion (WELLBUTRIN XL) 150 MG extended release tablet TAKE 1 TABLET BY MOUTH EVERY DAY 11/18/20  Yes Perry Parr MD   metoprolol succinate (TOPROL XL) 25 MG extended release tablet TAKE 1/2 TABLET BY MOUTH EVERY DAY 11/18/20  Yes Perry Reese MD   escitalopram (LEXAPRO) 10 MG tablet TAKE 1 TABLET BY MOUTH EVERY DAY 11/18/20  Yes Perry Parr MD   diphenhydrAMINE (BENADRYL) 25 MG capsule Take 25 mg by mouth nightly as needed for Itching   Yes Historical Provider, MD   omeprazole (PRILOSEC) 20 MG capsule Take 20 mg by mouth daily as needed    Yes Historical Provider, MD   cetirizine (ZYRTEC) 10 MG tablet Take 10 mg by mouth daily as needed    Yes Historical Provider, MD          Allergies:  Pollen extract and Tape [adhesive tape]     Review of Systems:   · Constitutional: no unanticipated weight loss. There's been no change in energy level, sleep pattern, or activity level. No fevers, chills. · Eyes: No visual changes or diplopia. No scleral icterus. · ENT: No Headaches, hearing loss or vertigo. No mouth sores or sore throat. · Cardiovascular: as reviewed in HPI  · Respiratory: No cough or wheezing, no sputum production. No hemoptysis. · Gastrointestinal: No abdominal pain, appetite loss, blood in stools. No change in bowel or bladder habits. · Genitourinary: No dysuria, trouble voiding, or hematuria. · Musculoskeletal:  No gait disturbance, no joint complaints. · Integumentary: No rash or pruritis. · Neurological: No headache, diplopia, change in muscle strength, numbness or tingling. · Psychiatric: No anxiety or depression. · Endocrine: No temperature intolerance. No excessive thirst, fluid intake, or urination. No tremor. · Hematologic/Lymphatic: No abnormal bruising or bleeding, blood clots or swollen lymph nodes. · Allergic/Immunologic: No nasal congestion or hives. Objective:   PHYSICAL EXAM:    Vitals:    05/10/21 1300   BP: 138/78   Pulse: 74   SpO2: 96%    Weight: 219 lb (99.3 kg)       General Appearance:  Alert, cooperative, no distress, appears stated age. Head:  Normocephalic, without obvious abnormality, atraumatic. Eyes:  Pupils equal and round. No scleral icterus. Mouth: Moist mucosa, no pharyngeal erythema. Nose: Nares normal. No drainage or sinus tenderness. Neck: Supple, symmetrical, trachea midline. No adenopathy. No tenderness/mass/nodules. No carotid bruit or elevated JVD. Lungs:   Clear to auscultation bilaterally, respirations unlabored. No wheeze, rales, or rhonchi. Chest Wall:  No tenderness or deformity. Heart:  Regular rate.  S1/S2 normal. No murmur, rub, or gallop. Abdomen:   Soft, non-tender, bowel sounds active. Musculoskeletal: No muscle wasting or digital clubbing. Extremities: Extremities normal, atraumatic. No cyanosis or edema. Pulses: 2+ radial and carotid pulses, symmetric. Skin: No rashes or lesions. Pysch: Normal mood and affect. Alert and oriented x 4.    Neurologic: Normal gross motor and sensory exam.       Labs     Lab Results   Component Value Date    WBC 9.4 03/19/2021    RBC 5.33 03/19/2021    HGB 16.0 03/19/2021    HCT 47.9 03/19/2021    MCV 89.9 03/19/2021    RDW 13.9 03/19/2021     03/19/2021     Lab Results   Component Value Date     03/19/2021    K 4.7 03/19/2021    K 4.0 05/01/2019     03/19/2021    CO2 25 03/19/2021    BUN 14 03/19/2021    CREATININE 1.2 03/19/2021    GFRAA 53 03/19/2021    GFRAA >60 01/28/2013    AGRATIO 1.4 03/19/2021    LABGLOM 44 03/19/2021    GLUCOSE 117 03/19/2021    PROT 7.4 03/19/2021    PROT 6.7 01/28/2013    CALCIUM 9.3 03/19/2021    BILITOT 0.3 03/19/2021    ALKPHOS 83 03/19/2021    AST 12 03/19/2021    ALT 8 03/19/2021      No results found for: PTINR    Lab Results   Component Value Date/Time    LABA1C 5.8 03/19/2021 04:13 PM     Lab Results   Component Value Date/Time    TROPONINI <0.01 04/30/2019 12:27 PM         CURRENT Medications:  Current Outpatient Medications on File Prior to Visit   Medication Sig Dispense Refill    ergocalciferol (DRISDOL) 1.25 MG (51875 UT) capsule Take 1 capsule by mouth once a week 12 capsule 3    metFORMIN (GLUCOPHAGE-XR) 500 MG extended release tablet TAKE 1 TABLET DAILY: 90 tablet 1    simvastatin (ZOCOR) 20 MG tablet TAKE 1 TABLET BY MOUTH EVERY DAY AT NIGHT 90 tablet 1    gabapentin (NEURONTIN) 300 MG capsule TAKE 1 CAPSULE BY MOUTH THREE TIMES A  capsule 1    ELIQUIS 5 MG TABS tablet TAKE 1 TABLET BY MOUTH TWICE A DAY 60 tablet 5    losartan (COZAAR) 25 MG tablet Take 1 tablet by mouth daily 30 tablet 5    buPROPion (WELLBUTRIN XL) 150 24 hours post.     We will plan to call with test results and plan for yearly follow up     Thank you for allowing us to participate in the care of Garcia Rivers MD [address]   (C): [phone]  (O): [phone]     This note was scribed in the presence of Dr. Jonh Cox MD by Marco Antonio Matute, KANE. Physician Attestation:  The scribes documentation has been prepared under my direction and personally reviewed by me in its entirety. I confirm the note above accurately reflects all work, treatment, procedures, and medical decision making performed by me.     Electronically signed by Billy Gee MD on 5/11/2021 at 3:38 PM

## 2021-05-10 ENCOUNTER — OFFICE VISIT (OUTPATIENT)
Dept: CARDIOLOGY CLINIC | Age: 76
End: 2021-05-10
Payer: MEDICARE

## 2021-05-10 ENCOUNTER — NURSE ONLY (OUTPATIENT)
Dept: CARDIOLOGY CLINIC | Age: 76
End: 2021-05-10

## 2021-05-10 VITALS
HEIGHT: 66 IN | HEART RATE: 74 BPM | BODY MASS INDEX: 35.2 KG/M2 | SYSTOLIC BLOOD PRESSURE: 138 MMHG | WEIGHT: 219 LBS | OXYGEN SATURATION: 96 % | DIASTOLIC BLOOD PRESSURE: 78 MMHG

## 2021-05-10 DIAGNOSIS — I44.2 ATRIOVENTRICULAR BLOCK, THIRD DEGREE (HCC): ICD-10-CM

## 2021-05-10 DIAGNOSIS — E78.5 HYPERLIPIDEMIA, UNSPECIFIED HYPERLIPIDEMIA TYPE: ICD-10-CM

## 2021-05-10 DIAGNOSIS — R09.89 OTHER SPECIFIED SYMPTOMS AND SIGNS INVOLVING THE CIRCULATORY AND RESPIRATORY SYSTEMS: ICD-10-CM

## 2021-05-10 DIAGNOSIS — M79.651 BILATERAL THIGH PAIN: ICD-10-CM

## 2021-05-10 DIAGNOSIS — I73.9 CLAUDICATION (HCC): ICD-10-CM

## 2021-05-10 DIAGNOSIS — E11.9 TYPE 2 DIABETES MELLITUS WITHOUT COMPLICATION, WITHOUT LONG-TERM CURRENT USE OF INSULIN (HCC): ICD-10-CM

## 2021-05-10 DIAGNOSIS — Z95.0 PACEMAKER: ICD-10-CM

## 2021-05-10 DIAGNOSIS — M79.652 BILATERAL THIGH PAIN: ICD-10-CM

## 2021-05-10 DIAGNOSIS — I48.0 PAF (PAROXYSMAL ATRIAL FIBRILLATION) (HCC): ICD-10-CM

## 2021-05-10 DIAGNOSIS — R00.1 BRADYCARDIA: ICD-10-CM

## 2021-05-10 DIAGNOSIS — I10 ESSENTIAL HYPERTENSION: Primary | ICD-10-CM

## 2021-05-10 DIAGNOSIS — Z95.0 PRESENCE OF PERMANENT CARDIAC PACEMAKER: ICD-10-CM

## 2021-05-10 PROCEDURE — 1090F PRES/ABSN URINE INCON ASSESS: CPT | Performed by: INTERNAL MEDICINE

## 2021-05-10 PROCEDURE — 1036F TOBACCO NON-USER: CPT | Performed by: INTERNAL MEDICINE

## 2021-05-10 PROCEDURE — G8417 CALC BMI ABV UP PARAM F/U: HCPCS | Performed by: INTERNAL MEDICINE

## 2021-05-10 PROCEDURE — 1123F ACP DISCUSS/DSCN MKR DOCD: CPT | Performed by: INTERNAL MEDICINE

## 2021-05-10 PROCEDURE — 93000 ELECTROCARDIOGRAM COMPLETE: CPT | Performed by: INTERNAL MEDICINE

## 2021-05-10 PROCEDURE — G8427 DOCREV CUR MEDS BY ELIG CLIN: HCPCS | Performed by: INTERNAL MEDICINE

## 2021-05-10 PROCEDURE — G8399 PT W/DXA RESULTS DOCUMENT: HCPCS | Performed by: INTERNAL MEDICINE

## 2021-05-10 PROCEDURE — 99204 OFFICE O/P NEW MOD 45 MIN: CPT | Performed by: INTERNAL MEDICINE

## 2021-05-10 PROCEDURE — 4040F PNEUMOC VAC/ADMIN/RCVD: CPT | Performed by: INTERNAL MEDICINE

## 2021-05-10 NOTE — PATIENT INSTRUCTIONS
Patient Education        Doppler Ultrasound: About This Test  What is it? An ultrasound uses reflected sound waves to produce a picture of organs and blood vessels. The sound waves create a picture on a video screen. Doppler ultrasound is a special kind of ultrasound. It can detect movement of blood through arteries and veins. Some ultrasound tests are called \"duplex. \" Duplex means \"two parts. \" A duplex ultrasound combines the Doppler ultrasound with the more common ultrasound. The combination can help the doctor see more clearly what's going on. There are no risks linked to an ultrasound test, and it is safe for pregnant women. It won't harm the baby (fetus). Why is this test done? You might have a Doppler ultrasound to:  · Look for reduced blood flow in major neck arteries. Low blood flow in these arteries can cause a stroke. · Find a blood clot in leg veins, which could be a deep vein thrombosis. · Check blood flow in a fetus to check the health of the fetus. · Find a blockage or a narrowing in the arteries that go to the kidneys. How do you prepare for the test?  Depending on what the test is for, you may be asked not to eat or drink after midnight before the test. Or you may be asked to drink water right before the test so that your bladder is full. How is the test done? The doctor or ultrasound technologist will have you lie on your back, side, or stomach, depending on which part of your body is being examined. · A gel will be applied to your skin. This helps the passage of sound waves. · A hand-held device called a transducer will be moved along your skin. · You'll need to stay still during the test.  How long does the test take? The test will take 30 to 60 minutes. What happens after the test?  · The scans from the test will be read within a short time, in case a repeat test is needed. · You will probably be able to go home as soon as the test has been read.   · You can go back to your usual activities right away. Follow-up care is a key part of your treatment and safety. Be sure to make and go to all appointments, and call your doctor if you are having problems. It's also a good idea to keep a list of the medicines you take. Ask your doctor when you can expect to have your test results. Where can you learn more? Go to https://chpepiceweb.[email or web address] org and sign in to your Socialeyes App account. Enter F081 in the Ver[employer]ne box to learn more about \"Doppler Ultrasound: About This Test.\"     If you do not have an account, please click on the \"Sign Up Now\" link. Current as of: September 23, 2020               Content Version: 12.8  © 2006-2021 Healthwise, [employer]. Care instructions adapted under license by [facility] ([facility]). If you have questions about a medical condition or this instruction, always ask your healthcare professional. [address] any warranty or liability for your use of this information. Patient Education        Walking for Exercise: Care Instructions  Your Care Instructions     Walking is one of the easiest ways to get the exercise you need for good health. A brisk, 30-minute walk each day can help you feel better and have more energy. It can help you lower your risk of disease. Walking can help you keep your bones strong and your heart healthy. Check with your doctor before you start a walking plan if you have heart problems, other health issues, or you have not been active in a long time. Follow your doctor's instructions for safe levels of exercise. Follow-up care is a key part of your treatment and safety. Be sure to make and go to all appointments, and call your doctor if you are having problems. It's also a good idea to know your test results and keep a list of the medicines you take. How can you care for yourself at home?   Getting started  · Start slowly and set a short-term goal. For example, walk for 5 or 10 minutes every is hot out. · Be careful not to slip on wet or icy ground. You can buy \"grippers\" for your shoes to help keep you from slipping. · Pay attention to your walking surface. Use sidewalks and paths. · If you have health issues such as asthma, COPD, or heart problems, or if you haven't been active for a long time, check with your doctor before you start a new activity. Where can you learn more? Go to https://Pharmaca.[employer]. org and sign in to your [place] account. Enter R159 in the iClinical box to learn more about \"Walking for Exercise: Care Instructions. \"     If you do not have an account, please click on the \"Sign Up Now\" link. Current as of: September 10, 2020               Content Version: 12.8  © 5391-3049 [employer]edo. Care instructions adapted under license by [facility] ([facility]). If you have questions about a medical condition or this instruction, always ask your healthcare professional. [place] [address] any warranty or liability for your use of this information. Patient Education        Heart-Healthy Diet: Care Instructions  Your Care Instructions     A heart-healthy diet has lots of vegetables, fruits, nuts, beans, and whole grains, and is low in salt. It limits foods that are high in saturated fat, such as meats, cheeses, and fried foods. It may be hard to change your diet, but even small changes can lower your risk of heart attack and heart disease. Follow-up care is a key part of your treatment and safety. Be sure to make and go to all appointments, and call your doctor if you are having problems. It's also a good idea to know your test results and keep a list of the medicines you take. How can you care for yourself at home? Watch your portions  · Use food labels to learn what the recommended servings are for the foods you eat. · Eat only the number of calories you need to stay at a healthy weight.  If you need to lose weight, eat fewer calories than your body burns (through exercise and other physical activity). Eat more fruits and vegetables  · Eat a variety of fruit and vegetables every day. Dark green, deep orange, red, or yellow fruits and vegetables are especially good for you. Examples include spinach, carrots, peaches, and berries. · Keep carrots, celery, and other veggies handy for snacks. Buy fruit that is in season and store it where you can see it so that you will be tempted to eat it. · Cook dishes that have a lot of veggies in them, such as stir-fries and soups. Limit saturated fat  · Read food labels, and try to avoid saturated fats. They increase your risk of heart disease. · Use olive or canola oil when you cook. · Bake, broil, grill, or steam foods instead of frying them. · Choose lean meats instead of high-fat meats such as hot dogs and sausages. Cut off all visible fat when you prepare meat. · Eat fish, skinless poultry, and meat alternatives such as soy products instead of high-fat meats. Soy products, such as tofu, may be especially good for your heart. · Choose low-fat or fat-free milk and dairy products. Eat foods high in fiber  · Eat a variety of grain products every day. Include whole-grain foods that have lots of fiber and nutrients. Examples of whole-grain foods include oats, whole wheat bread, and brown rice. · Buy whole-grain breads and cereals, instead of white bread or pastries. Limit salt and sodium  · Limit how much salt and sodium you eat to help lower your blood pressure. · Taste food before you salt it. Add only a little salt when you think you need it. With time, your taste buds will adjust to less salt. · Eat fewer snack items, fast foods, and other high-salt, processed foods. Check food labels for the amount of sodium in packaged foods. · Choose low-sodium versions of canned goods (such as soups, vegetables, and beans). Limit sugar  · Limit drinks and foods with added sugar.  These include

## 2021-05-24 RX ORDER — LOSARTAN POTASSIUM 25 MG/1
TABLET ORAL
Qty: 90 TABLET | Refills: 1 | Status: SHIPPED | OUTPATIENT
Start: 2021-05-24 | End: 2021-12-08

## 2021-07-06 ENCOUNTER — HOSPITAL ENCOUNTER (OUTPATIENT)
Dept: ULTRASOUND IMAGING | Age: 76
Discharge: HOME | End: 2021-07-06
Payer: MEDICARE

## 2021-07-06 ENCOUNTER — HOSPITAL ENCOUNTER (OUTPATIENT)
Dept: VASCULAR LAB | Age: 76
Discharge: HOME | End: 2021-07-06
Payer: MEDICARE

## 2021-07-06 ENCOUNTER — NURSE ONLY (OUTPATIENT)
Dept: CARDIOLOGY CLINIC | Age: 76
End: 2021-07-06
Payer: MEDICARE

## 2021-07-06 DIAGNOSIS — I10 ESSENTIAL HYPERTENSION: Primary | ICD-10-CM

## 2021-07-06 DIAGNOSIS — I10 ESSENTIAL HYPERTENSION: ICD-10-CM

## 2021-07-06 DIAGNOSIS — E11.9 TYPE 2 DIABETES MELLITUS WITHOUT COMPLICATION, WITHOUT LONG-TERM CURRENT USE OF INSULIN (HCC): ICD-10-CM

## 2021-07-06 DIAGNOSIS — E78.5 HYPERLIPIDEMIA, UNSPECIFIED HYPERLIPIDEMIA TYPE: ICD-10-CM

## 2021-07-06 DIAGNOSIS — M79.652 BILATERAL THIGH PAIN: ICD-10-CM

## 2021-07-06 DIAGNOSIS — M79.651 BILATERAL THIGH PAIN: ICD-10-CM

## 2021-07-06 DIAGNOSIS — R09.89 OTHER SPECIFIED SYMPTOMS AND SIGNS INVOLVING THE CIRCULATORY AND RESPIRATORY SYSTEMS: ICD-10-CM

## 2021-07-06 DIAGNOSIS — Z95.0 PACEMAKER: ICD-10-CM

## 2021-07-06 DIAGNOSIS — I44.2 COMPLETE HEART BLOCK (HCC): ICD-10-CM

## 2021-07-06 DIAGNOSIS — I73.9 CLAUDICATION (HCC): ICD-10-CM

## 2021-07-06 PROCEDURE — 93294 REM INTERROG EVL PM/LDLS PM: CPT | Performed by: INTERNAL MEDICINE

## 2021-07-06 PROCEDURE — 76706 US ABDL AORTA SCREEN AAA: CPT

## 2021-07-06 PROCEDURE — 93925 LOWER EXTREMITY STUDY: CPT

## 2021-07-06 PROCEDURE — 93880 EXTRACRANIAL BILAT STUDY: CPT

## 2021-07-06 PROCEDURE — 93296 REM INTERROG EVL PM/IDS: CPT | Performed by: INTERNAL MEDICINE

## 2021-07-06 NOTE — PROGRESS NOTES
We received remote transmission from patient's dual chamber pacemaker monitor at home. Transmission shows normal sensing and pacing function. No new arrhythmias/events recorded. Ap 16.8%   88.0% (MVP Off)    EP physician will review. See interrogation under cardiology tab in the [address] [place] [address] [address] field for more details.

## 2021-07-21 DIAGNOSIS — E78.2 MIXED HYPERLIPIDEMIA: ICD-10-CM

## 2021-07-21 RX ORDER — SIMVASTATIN 20 MG
TABLET ORAL
Qty: 90 TABLET | Refills: 0 | Status: SHIPPED | OUTPATIENT
Start: 2021-07-21 | End: 2021-11-18

## 2021-07-21 NOTE — TELEPHONE ENCOUNTER
Estephania Martin MD, patient out of refills. Rx pended for your signature/modification as appropriate    LOV: 3/30/21  Next: to be scheduled - due @Sept    Thank you,  Madelyn Welch, PharmD, [facility]  Direct: [phone]  Department, toll free: [phone], option 7  =======================================================  POPULATION HEALTH CLINICAL PHARMACY REVIEW: ADHERENCE REVIEW  Identified care gap per [place]; fills at [facility]: Diabetes adherence    Last Visit: 3/30/21    Patient also appears to be prescribed: ARB, statin     Patient not found in Outcomes MTM    ASSESSMENT  ACE/ARB ADHERENCE    Per Insurance Records through 7/19/21 (NICK Ruiz = 100%; Potential Fail Date: 11/3/21): Losartan 25mg daily last filled on 5/24/21 for 90 day supply. Next refill due: 8/24/21    Per chart, should have refill remaining    BP Readings from Last 3 Encounters:   05/10/21 138/78   03/19/21 114/68   09/30/20 116/78     CrCl cannot be calculated (Patient's most recent lab result is older than the maximum 120 days allowed. ). DIABETES ADHERENCE    Per Insurance Records through 7/19/21 (NICK Ruiz = Filled only once; Potential Fail Date: 7/27/21): Metformin ER 500mg daily last filled on 2/25/21 for 90 day supply. Next refill due: 5/26/21    Per chart, should have refill/rx from 3/19/21 available to fill    Lab Results   Component Value Date    LABA1C 5.8 03/19/2021    LABA1C 5.8 09/23/2020    LABA1C 6.0 01/30/2020     STATIN ADHERENCE    Per Insurance Records through 7/19/21 (NICK Ruiz = 100%; Potential Fail Date: 11/11/21): Simvastatin 20mg daily last filled on 6/17/21 for 90 day supply.  Next refill due: 9/15/21    Per chart, likely 0 refills remain    Lab Results   Component Value Date    CHOL 191 09/23/2020    TRIG 177 (H) 09/23/2020    HDL 49 09/23/2020    LDLCALC 107 (H) 09/23/2020     ALT   Date Value Ref Range Status   03/19/2021 8 (L) 10 - 40 U/L Final     AST Date Value Ref Range Status   03/19/2021 12 (L) 15 - 37 U/L Final     The 10-year ASCVD risk score (Gaston Morrissey., et al., 2013) is: 48.3%    Values used to calculate the score:      Age: 66 years      Sex: Female      Is Non- : No      Diabetic: Yes      Tobacco smoker: No      Systolic Blood Pressure: 850 mmHg      Is BP treated: Yes      HDL Cholesterol: 49 mg/dL      Total Cholesterol: 191 mg/dL       PLAN  The following are interventions that have been identified:   - Patient overdue refilling metformin (refill was due @5/26) and active on home medication list.   - Patient needs refills for simvastatin when due again in Sept    Reached patient to review. States she is down to 1 tablet remaining of her metformin and planned to reorder today. States she was previously on automatic refill, but had too many of them, so stopped that and phone calls from [facility] and requests them when she needs them. Unclear if she ended up from surplus d/t prev early refills or during time of recall; does sound to be adherent and adherence is reflected in other rx refill histories. Patient accepts my offer to contact [facility] to process refill of metformin, and also would like her Eliquis refilled. Spoke to [facility] who will get metformin and Eliquis refills ready today for patient.     Future Appointments   Date Time Provider Troy Fitzgerald   10/5/2021 12:45 PM SCHEDULE, I DEVICE CHECK [facility]   10/5/2021  1:00 PM LILY Ochoa CNP [facility]   10/26/2021  7:45 AM SCHEDULE, I WEST REMOTE TRANSMISSION [facility]   1/25/2022  7:45 AM SCHEDULE, I Foster REMOTE TRANSMISSION [facility]   4/26/2022  7:45 AM SCHEDULE, I WEST REMOTE TRANSMISSION [facility]   7/26/2022  7:45 AM SCHEDULE, I Foster REMOTE TRANSMISSION [facility]

## 2021-07-22 NOTE — TELEPHONE ENCOUNTER
As below, patient to schedule appt.     Noted simvastatin refill approved, thank you!    =======================================================   For Pharmacy [address] in place:  No   Recommendation Provided To: Provider: 1 via Note to Provider, Patient/Caregiver: 1 via Telephone and Pharmacy: 2   Intervention Detail: Adherence Monitorin and Refill(s) Provided   Gap Closed?: Yes    Intervention Accepted By: Provider: 1, Patient/Caregiver: 1 and Pharmacy: 2   Time Spent (min): 20

## 2021-07-25 NOTE — RESULT ENCOUNTER NOTE
Please notify patient that their leg circulation study shows mild plaque.  Continue current medical therapy

## 2021-09-08 ENCOUNTER — OFFICE VISIT (OUTPATIENT)
Dept: INTERNAL MEDICINE CLINIC | Age: 76
End: 2021-09-08
Payer: MEDICARE

## 2021-09-08 VITALS
HEART RATE: 72 BPM | SYSTOLIC BLOOD PRESSURE: 134 MMHG | DIASTOLIC BLOOD PRESSURE: 80 MMHG | BODY MASS INDEX: 36.48 KG/M2 | WEIGHT: 227 LBS | HEIGHT: 66 IN

## 2021-09-08 DIAGNOSIS — E55.9 VITAMIN D DEFICIENCY: ICD-10-CM

## 2021-09-08 DIAGNOSIS — R41.3 POOR MEMORY: ICD-10-CM

## 2021-09-08 DIAGNOSIS — I10 ESSENTIAL HYPERTENSION: ICD-10-CM

## 2021-09-08 DIAGNOSIS — N18.30 STAGE 3 CHRONIC KIDNEY DISEASE, UNSPECIFIED WHETHER STAGE 3A OR 3B CKD (HCC): ICD-10-CM

## 2021-09-08 DIAGNOSIS — R73.02 IMPAIRED GLUCOSE TOLERANCE: Primary | ICD-10-CM

## 2021-09-08 DIAGNOSIS — E78.2 MIXED HYPERLIPIDEMIA: ICD-10-CM

## 2021-09-08 DIAGNOSIS — E66.01 CLASS 2 SEVERE OBESITY DUE TO EXCESS CALORIES WITH SERIOUS COMORBIDITY AND BODY MASS INDEX (BMI) OF 36.0 TO 36.9 IN ADULT (HCC): ICD-10-CM

## 2021-09-08 DIAGNOSIS — E79.0 HYPERURICEMIA: ICD-10-CM

## 2021-09-08 LAB
BASOPHILS ABSOLUTE: 0.1 K/UL (ref 0–0.2)
BASOPHILS RELATIVE PERCENT: 1.1 %
EOSINOPHILS ABSOLUTE: 0.3 K/UL (ref 0–0.6)
EOSINOPHILS RELATIVE PERCENT: 3.4 %
HCT VFR BLD CALC: 45 % (ref 36–48)
HEMOGLOBIN: 15.1 G/DL (ref 12–16)
LYMPHOCYTES ABSOLUTE: 2.2 K/UL (ref 1–5.1)
LYMPHOCYTES RELATIVE PERCENT: 26.6 %
MCH RBC QN AUTO: 30.2 PG (ref 26–34)
MCHC RBC AUTO-ENTMCNC: 33.5 G/DL (ref 31–36)
MCV RBC AUTO: 90.1 FL (ref 80–100)
MONOCYTES ABSOLUTE: 0.9 K/UL (ref 0–1.3)
MONOCYTES RELATIVE PERCENT: 10.9 %
NEUTROPHILS ABSOLUTE: 4.7 K/UL (ref 1.7–7.7)
NEUTROPHILS RELATIVE PERCENT: 58 %
PDW BLD-RTO: 13.9 % (ref 12.4–15.4)
PLATELET # BLD: 241 K/UL (ref 135–450)
PMV BLD AUTO: 7.4 FL (ref 5–10.5)
RBC # BLD: 4.99 M/UL (ref 4–5.2)
WBC # BLD: 8.1 K/UL (ref 4–11)

## 2021-09-08 PROCEDURE — 1123F ACP DISCUSS/DSCN MKR DOCD: CPT | Performed by: FAMILY MEDICINE

## 2021-09-08 PROCEDURE — 1090F PRES/ABSN URINE INCON ASSESS: CPT | Performed by: FAMILY MEDICINE

## 2021-09-08 PROCEDURE — 99214 OFFICE O/P EST MOD 30 MIN: CPT | Performed by: FAMILY MEDICINE

## 2021-09-08 PROCEDURE — 1036F TOBACCO NON-USER: CPT | Performed by: FAMILY MEDICINE

## 2021-09-08 PROCEDURE — 4040F PNEUMOC VAC/ADMIN/RCVD: CPT | Performed by: FAMILY MEDICINE

## 2021-09-08 PROCEDURE — G8399 PT W/DXA RESULTS DOCUMENT: HCPCS | Performed by: FAMILY MEDICINE

## 2021-09-08 PROCEDURE — G8417 CALC BMI ABV UP PARAM F/U: HCPCS | Performed by: FAMILY MEDICINE

## 2021-09-08 PROCEDURE — G8427 DOCREV CUR MEDS BY ELIG CLIN: HCPCS | Performed by: FAMILY MEDICINE

## 2021-09-08 NOTE — PATIENT INSTRUCTIONS
We spend 1/3 of our lives in bed. It can make our quality of life better to sleep better. Sleep Number Bed. 15 year warranty on them. Buy it during a holiday. Sales on the holidays     xxxxxxxxxxxxxxxxxxxxxxxxxxxxxxxxxxxxxxx    As of this date, COVID vaccine boosters or extra doses are not currently recommended unless you are immunosuppressed now or at the time of your vaccine(s). Immunosuppressed patients are in the following categories. Those using medication to prevent rejection of an organ transplant, chemotherapy, or biologic treatments that suppress the immune system used to treat autoimmune diseases like rheumatoid arthritis. The [employer] is meeting monthly (end of each month) and discussing this issue. As of their last meeting, the say NO to extra doses for a non-immunosuppressed fully vaccinated person. This could change in the future. You may wish to follow them by looking up ACIP.

## 2021-09-08 NOTE — PROGRESS NOTES
Subjective:      Patient ID: Nicholas Bernabe is a 66 y.o. female. HPI   Chief Complaint   Patient presents with    6 Month Follow-Up     FU of GERD, mixed HLP,  HTN,  IGT,  CKD3. Vitamin D deficiency. She has no c/o. The battery pack from her pacer tends to move around in her chest and this is a nuisance. Dr. Renee Barajas ordered some vascular scans:  Had bilateral carotids, LE dopplers and US screening for AAA        See Assessment for more detail on conditions addressed today.     Patient Active Problem List   Diagnosis    Osteoarthritis    GERD (gastroesophageal reflux disease)    Mixed hyperlipidemia    Obstructive Sleep Apnea on CPAP    Impaired glucose tolerance    Hypertension    Generalized anxiety disorder    Lipoma of skin    Myalgia    Chronic kidney disease (CKD), stage III (moderate) (HCC)    Periodic limb movement disorder    Obesity, Class II, BMI 35-39.9    Lipoma    Ganglion of wrist    Hyperuricemia    Pacemaker    Vitamin D deficiency    FH: colon cancer    Poor memory         Outpatient Medications Marked as Taking for the 9/8/21 encounter (Office Visit) with Sharon Fernández MD   Medication Sig Dispense Refill    simvastatin (ZOCOR) 20 MG tablet TAKE 1 TABLET BY MOUTH EVERY DAY AT NIGHT 90 tablet 0    losartan (COZAAR) 25 MG tablet TAKE 1 TABLET BY MOUTH EVERY DAY 90 tablet 1    ergocalciferol (DRISDOL) 1.25 MG (33940 UT) capsule Take 1 capsule by mouth once a week 12 capsule 3    metFORMIN (GLUCOPHAGE-XR) 500 MG extended release tablet TAKE 1 TABLET DAILY: 90 tablet 1    gabapentin (NEURONTIN) 300 MG capsule TAKE 1 CAPSULE BY MOUTH THREE TIMES A  capsule 1    ELIQUIS 5 MG TABS tablet TAKE 1 TABLET BY MOUTH TWICE A DAY 60 tablet 5    buPROPion (WELLBUTRIN XL) 150 MG extended release tablet TAKE 1 TABLET BY MOUTH EVERY DAY 90 tablet 3    metoprolol succinate (TOPROL XL) 25 MG extended release tablet TAKE 1/2 TABLET BY MOUTH EVERY DAY 45 tablet 3    escitalopram (LEXAPRO) 10 MG tablet TAKE 1 TABLET BY MOUTH EVERY DAY 90 tablet 1    diphenhydrAMINE (BENADRYL) 25 MG capsule Take 25 mg by mouth nightly as needed for Itching      omeprazole (PRILOSEC) 20 MG capsule Take 20 mg by mouth daily as needed       cetirizine (ZYRTEC) 10 MG tablet Take 10 mg by mouth daily as needed          Social History     Tobacco Use    Smoking status: Never Smoker    Smokeless tobacco: Never Used   Vaping Use    Vaping Use: Never assessed   Substance Use Topics    Alcohol use: Yes     Comment: rarely    Drug use: Never         Review of Systems   Respiratory: Negative for cough, chest tightness, shortness of breath and wheezing. Cardiovascular: Negative for chest pain, palpitations and leg swelling. Skin: Negative for rash and wound. Neurological: Negative for dizziness, syncope, facial asymmetry, speech difficulty, weakness, numbness and headaches. Psychiatric/Behavioral: Negative for behavioral problems and confusion. Objective:   Physical Exam  Vitals reviewed. Constitutional:       General: She is not in acute distress. Appearance: Normal appearance. She is well-developed. She is not diaphoretic. Eyes:      General: No scleral icterus. Neck:      Thyroid: No thyroid mass or thyromegaly. Vascular: No carotid bruit. Cardiovascular:      Rate and Rhythm: Normal rate and regular rhythm. No extrasystoles are present. Heart sounds: S1 normal and S2 normal. Heart sounds are distant. No murmur heard. No friction rub. Pulmonary:      Effort: Pulmonary effort is normal. No respiratory distress. Breath sounds: Normal breath sounds. No decreased breath sounds, wheezing, rhonchi or rales. Abdominal:      General: Bowel sounds are normal. There is no abdominal bruit. Palpations: Abdomen is soft. There is no hepatomegaly or mass. Tenderness: There is no abdominal tenderness. Musculoskeletal:      Cervical back: Neck supple. Right lower leg: No edema. Left lower leg: No edema. Lymphadenopathy:      Cervical: No cervical adenopathy. Skin:     General: Skin is warm and dry. Coloration: Skin is not pale. Findings: No erythema. Nails: There is no clubbing. Neurological:      Mental Status: She is alert and oriented to person, place, and time. Motor: No tremor or abnormal muscle tone. Coordination: Coordination normal.      Gait: Gait normal.   Psychiatric:         Speech: Speech normal.         Behavior: Behavior normal.        Reviewed cardiovascular testing  AAA screen negative  Carotids: 0-15% minimal plaque  Lower extremities:  Mild plaque. Assessment:      1. Impaired glucose tolerance  A1C 5.8 - 6.0   Encouraged improved diet and weight loss. - Comprehensive Metabolic Panel  - Hemoglobin A1C    2. Stage 3 chronic kidney disease, unspecified whether stage 3a or 3b CKD (HCC)  Encouraged weight loss and drink enough water.   - CBC Auto Differential  - Comprehensive Metabolic Panel    3. Mixed hyperlipidemia  On statin. LDL is above goal but so far minimal plaque on testing at age 75, likely good enough  - Comprehensive Metabolic Panel  - Lipid, Fasting    4. Essential hypertension  BP: 134/80   At goal and meeting medical guidelines. Continue treatment. 5. Hyperuricemia  No gout and in past not high enough for treatment   - Uric Acid    6. Vitamin D deficiency  - Vitamin D 25 Hydroxy    7. Class 2 severe obesity due to excess calories with serious comorbidity and body mass index (BMI) of 36.0 to 36.9 in [facility])  Encouraged healthy diet and portion control. At her age, doubt she will make too many changes but encouraged to work on this. Plan:      See orders. See after visit summary, patient instructions, and reference hand-outs.   A PRINTED SUMMARY = AVS GIVEN TO THE PATIENT, (or if Virtual Visit, patient told it is available in My Chart or will be mailed if not active on My Chart.)    Discussed use, benefit, and side effects of prescribed medications. Barriers to medication compliance addressed. All patient questions answered. Follow up:  Return in about 6 months (around 3/8/2022).             Sharon Fernández MD

## 2021-09-09 LAB
A/G RATIO: 1.6 (ref 1.1–2.2)
ALBUMIN SERPL-MCNC: 4.2 G/DL (ref 3.4–5)
ALP BLD-CCNC: 82 U/L (ref 40–129)
ALT SERPL-CCNC: 7 U/L (ref 10–40)
ANION GAP SERPL CALCULATED.3IONS-SCNC: 14 MMOL/L (ref 3–16)
AST SERPL-CCNC: 9 U/L (ref 15–37)
BILIRUB SERPL-MCNC: 0.5 MG/DL (ref 0–1)
BUN BLDV-MCNC: 15 MG/DL (ref 7–20)
CALCIUM SERPL-MCNC: 9.4 MG/DL (ref 8.3–10.6)
CHLORIDE BLD-SCNC: 99 MMOL/L (ref 99–110)
CHOLESTEROL, FASTING: 205 MG/DL (ref 0–199)
CO2: 26 MMOL/L (ref 21–32)
CREAT SERPL-MCNC: 1 MG/DL (ref 0.6–1.2)
ESTIMATED AVERAGE GLUCOSE: 119.8 MG/DL
GFR AFRICAN AMERICAN: >60
GFR NON-AFRICAN AMERICAN: 54
GLOBULIN: 2.7 G/DL
GLUCOSE BLD-MCNC: 107 MG/DL (ref 70–99)
HBA1C MFR BLD: 5.8 %
HDLC SERPL-MCNC: 55 MG/DL (ref 40–60)
LDL CHOLESTEROL CALCULATED: 109 MG/DL
POTASSIUM SERPL-SCNC: 4.3 MMOL/L (ref 3.5–5.1)
SODIUM BLD-SCNC: 139 MMOL/L (ref 136–145)
TOTAL PROTEIN: 6.9 G/DL (ref 6.4–8.2)
TRIGLYCERIDE, FASTING: 205 MG/DL (ref 0–150)
URIC ACID, SERUM: 8.2 MG/DL (ref 2.6–6)
VITAMIN D 25-HYDROXY: 58.6 NG/ML
VLDLC SERPL CALC-MCNC: 41 MG/DL

## 2021-09-11 NOTE — RESULT ENCOUNTER NOTE
Call patient:   results released to My Chart. Labs are overall OK. Cholesterol is not quite to goal.  Can you do better with your diet? The main abnormality is high uric acid. It can cause gout at this level. Too much animal protein, high sodium diet and high fructose corn syrup (used as a sweetener in many foods and drinks) can elevate uric acid. Drinking more water also helps flush it out through the kidneys. If you have swelling, aches or pains in the knees, ankles, elbows or feet, we will consider medication to lower this.

## 2021-09-12 ASSESSMENT — ENCOUNTER SYMPTOMS
CHEST TIGHTNESS: 0
COUGH: 0
SHORTNESS OF BREATH: 0
WHEEZING: 0

## 2021-10-04 RX ORDER — APIXABAN 5 MG/1
TABLET, FILM COATED ORAL
Qty: 60 TABLET | Refills: 3 | Status: SHIPPED | OUTPATIENT
Start: 2021-10-04 | End: 2022-03-23

## 2021-10-04 NOTE — PROGRESS NOTES
Extract     Tape Sherolyn Pott Tape] Itching     Home Medications:  Prior to Visit Medications    Medication Sig Taking?  Authorizing Provider   ELIQUIS 5 MG TABS tablet TAKE 1 TABLET BY MOUTH TWICE A DAY Yes Ryan Joel MD   simvastatin (ZOCOR) 20 MG tablet TAKE 1 TABLET BY MOUTH EVERY DAY AT NIGHT Yes Poly Oquendo MD   losartan (COZAAR) 25 MG tablet TAKE 1 TABLET BY MOUTH EVERY DAY Yes Poly Oquendo MD   ergocalciferol (DRISDOL) 1.25 MG (09894 UT) capsule Take 1 capsule by mouth once a week Yes Poly Oquendo MD   metFORMIN (GLUCOPHAGE-XR) 500 MG extended release tablet TAKE 1 TABLET DAILY: Yes Poly Oquendo MD   buPROPion (WELLBUTRIN XL) 150 MG extended release tablet TAKE 1 TABLET BY MOUTH EVERY DAY Yes Poly Oquendo MD   metoprolol succinate (TOPROL XL) 25 MG extended release tablet TAKE 1/2 TABLET BY MOUTH Makenzie Ospina Yes Suman Salazar MD   escitalopram (LEXAPRO) 10 MG tablet TAKE 1 TABLET BY MOUTH EVERY DAY Yes Poly Oquendo MD   diphenhydrAMINE (BENADRYL) 25 MG capsule Take 25 mg by mouth nightly as needed for Itching Yes Historical Provider, MD   omeprazole (PRILOSEC) 20 MG capsule Take 20 mg by mouth daily as needed  Yes Historical Provider, MD   cetirizine (ZYRTEC) 10 MG tablet Take 10 mg by mouth daily as needed  Yes Historical Provider, MD   gabapentin (NEURONTIN) 300 MG capsule TAKE 1 CAPSULE BY MOUTH THREE TIMES A DAY  Poly Oquendo MD        Past Medical History:  Past Medical History:   Diagnosis Date    Abnormal EKG     RBBB < 2008    Abnormal glucose tolerance test     Cataract     [facility]), [facility] ([facility]) [facility]     COVID-19 virus vaccination contraindicated     Elbow fracture 06/05/2008    Generalized anxiety disorder     GERD (gastroesophageal reflux disease)     Herpes zoster 08/2015    Hypertension     Intermittent atrial fibrillation ([facility] 10/21/2019    Lipoma of skin     Mixed hyperlipidemia     Myalgia     Obstructive sleep apnea     Osteoarthritis     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 Magalys Garcia  6/08    ORIF right;  both fractured    PACEMAKER PLACEMENT  05/08/2019    SOLIS AND BSO         Social History:   reports that she has never smoked. She has never used smokeless tobacco. She reports current alcohol use. She reports that she does not use drugs. Family History:      Problem Relation Age of Onset    Hypertension Mother     COPD Mother         gangrene    Cancer Mother         bladder    Colon Cancer Father     Hypertension Son     Elevated Lipids Son     Elevated Lipids Son     Thyroid Disease Maternal Aunt        Review of Systems   Constitutional: Negative for chills, fatigue, fever and unexpected weight change. HENT: Negative for congestion, hearing loss, sinus pressure, sore throat and trouble swallowing. Respiratory: Negative for cough, shortness of breath and wheezing. Cardiovascular: Negative for chest pain, palpitations and leg swelling. Gastrointestinal: Negative for abdominal pain, blood in stool, constipation, diarrhea, nausea and vomiting. Genitourinary: Negative for hematuria. Musculoskeletal: Negative for arthralgias, back pain, gait problem and myalgias. Skin: Negative for color change, rash and wound. Neurological: Negative for dizziness, seizures, syncope, speech difficulty, weakness and light-headedness. Hematological: Does not bruise/bleed easily.         Physical Examination:  Vitals:    10/05/21 1242   BP: 110/62   Pulse: 88   SpO2: 97%      Wt Readings from Last 3 Encounters:   10/05/21 224 lb (101.6 kg)   09/08/21 227 lb (103 kg)   05/10/21 219 lb (99.3 kg)       Physical Exam  Vitals reviewed. Constitutional:       General: She is not in acute distress. Appearance: Normal appearance. HENT:      Head: Normocephalic and atraumatic. Nose: Nose normal.      Mouth/Throat:      Mouth: Mucous membranes are moist.   Eyes:      Conjunctiva/sclera: Conjunctivae normal.      Pupils: Pupils are equal, round, and reactive to light. Cardiovascular:      Rate and Rhythm: Normal rate and regular rhythm. Heart sounds: No murmur heard. No friction rub. No gallop. Pulmonary:      Effort: No respiratory distress. Breath sounds: No wheezing, rhonchi or rales. Abdominal:      General: Abdomen is flat. Bowel sounds are normal.      Palpations: Abdomen is soft. Musculoskeletal:         General: Normal range of motion. Right lower leg: No edema. Left lower leg: No edema. Skin:     General: Skin is warm and dry. Findings: No bruising. Neurological:      General: No focal deficit present. Mental Status: She is alert and oriented to person, place, and time. Motor: No weakness. Psychiatric:         Mood and Affect: Mood normal.         Behavior: Behavior normal.          Pertinent labs, diagnostic, device, and imaging results reviewed as a part of this visit    LABS    CBC:   Lab Results   Component Value Date    WBC 8.1 09/08/2021    HGB 15.1 09/08/2021    HCT 45.0 09/08/2021    MCV 90.1 09/08/2021     09/08/2021     BMP:   Lab Results   Component Value Date    CREATININE 1.0 09/08/2021    BUN 15 09/08/2021     09/08/2021    K 4.3 09/08/2021    CL 99 09/08/2021    CO2 26 09/08/2021     Estimated Creatinine Clearance: 57 mL/min (based on SCr of 1 mg/dL).    No results found for: BNP    Thyroid:   Lab Results   Component Value Date    TSH 1.27 02/07/2011     Lipid Panel:   Lab Results   Component Value Date    CHOL 191 09/23/2020    HDL 55 09/08/2021    HDL 49 09/15/2011    TRIG 177 09/23/2020     LFTs:  Lab Results   Component Value Date    ALT 7 (L) 09/08/2021    AST 9 (L) 09/08/2021    ALKPHOS 82 09/08/2021    BILITOT 0.5 09/08/2021     Coags: No results found for: PROTIME, INR, APTT    ECG: 10/5/2021   A-sensed V-paced at 77 BPM.    Echo: 9/16/20   Normal LV size with assymetric septal contraction due to RV pacing. EF   50-55%   Mild mitral regurgitation is present. The left atrium is normal in size. Normal right ventricular size and function. Pacer / ICD wire is visualized   in the right ventricle. Trivial tricuspid regurgitation. No pericardial effusion noted. Procedures:  1. Implantation of Medtronic dual chamber PPM on 4/30/19. 2. Revision of RV lead on 9/20/19. 3. Electrophysiology study with radiofrequency ablation of atrial fibrillation and pulmonary vein isolation 1/13/2020. Device interrogation: 10/5/2021   Normal device function. Battery life 9.5 years   A paced 9.3%  V paced 93.5%  No episodes.      Assessment & Plan:    Complete heart block   - noted in April 2019 s/p Medtronic dual chamber PPM implanted on 4/30/19 with RV lead revision on 9/20/19   - has had issues with RV lead high pacing capture threshold which was the issue prior to her lead revision, since it has occurred since the revision, Dr. Makeda Patel thinks she may have some biological response to the RV lead implantation that possibly causes increased fibrosis leading to high PCT   - device interrogation as above   - continue with routine follow up with device clinic    Paroxysmal atrial fibrillation   - seen on device interrogation 10/19   - s/p A fib ablation on 1/13/20 with Dr. Makeda Patel   - device interrogation with no arrhythmias    - on Toprol for past CMP   - CHADS2-VASc 6 (age, gender, HTN, DM, CMP) on Eliquis 5mg BID   - continue to monitor for signs of A fib and if symptomatic, can call in for remote device check    Recovered cardiomyopathy   - EF was as low as 25-30%, now recovered to 50-55%   - ? secondary to tachycardia   - continue Toprol and losartan    Thank you for allowing to us to participate in the care of [address] [place] [address]. Return in about 1 year (around 10/5/2022) for an appointment with Gala Clark NP.      LILY Dowd  [facility] [address]  Phone: [phone]  Fax: [phone]    Electronically signed by LILY Castillo - CNP on 10/5/2021 at 1:20 PM

## 2021-10-05 ENCOUNTER — NURSE ONLY (OUTPATIENT)
Dept: CARDIOLOGY CLINIC | Age: 76
End: 2021-10-05
Payer: MEDICARE

## 2021-10-05 ENCOUNTER — OFFICE VISIT (OUTPATIENT)
Dept: CARDIOLOGY CLINIC | Age: 76
End: 2021-10-05
Payer: MEDICARE

## 2021-10-05 VITALS
WEIGHT: 224 LBS | HEART RATE: 88 BPM | DIASTOLIC BLOOD PRESSURE: 62 MMHG | OXYGEN SATURATION: 97 % | SYSTOLIC BLOOD PRESSURE: 110 MMHG | BODY MASS INDEX: 36 KG/M2 | HEIGHT: 66 IN

## 2021-10-05 DIAGNOSIS — I44.2 COMPLETE HEART BLOCK (HCC): Primary | ICD-10-CM

## 2021-10-05 DIAGNOSIS — I49.5 SSS (SICK SINUS SYNDROME) (HCC): ICD-10-CM

## 2021-10-05 DIAGNOSIS — I48.0 PAF (PAROXYSMAL ATRIAL FIBRILLATION) (HCC): ICD-10-CM

## 2021-10-05 DIAGNOSIS — I42.9 CARDIOMYOPATHY, UNSPECIFIED TYPE (HCC): ICD-10-CM

## 2021-10-05 DIAGNOSIS — Z95.0 PACEMAKER: ICD-10-CM

## 2021-10-05 PROCEDURE — G8484 FLU IMMUNIZE NO ADMIN: HCPCS | Performed by: NURSE PRACTITIONER

## 2021-10-05 PROCEDURE — G8417 CALC BMI ABV UP PARAM F/U: HCPCS | Performed by: NURSE PRACTITIONER

## 2021-10-05 PROCEDURE — 99214 OFFICE O/P EST MOD 30 MIN: CPT | Performed by: NURSE PRACTITIONER

## 2021-10-05 PROCEDURE — 93280 PM DEVICE PROGR EVAL DUAL: CPT | Performed by: INTERNAL MEDICINE

## 2021-10-05 PROCEDURE — G8427 DOCREV CUR MEDS BY ELIG CLIN: HCPCS | Performed by: NURSE PRACTITIONER

## 2021-10-05 PROCEDURE — 1090F PRES/ABSN URINE INCON ASSESS: CPT | Performed by: NURSE PRACTITIONER

## 2021-10-05 PROCEDURE — G8399 PT W/DXA RESULTS DOCUMENT: HCPCS | Performed by: NURSE PRACTITIONER

## 2021-10-05 PROCEDURE — 1123F ACP DISCUSS/DSCN MKR DOCD: CPT | Performed by: NURSE PRACTITIONER

## 2021-10-05 PROCEDURE — 1036F TOBACCO NON-USER: CPT | Performed by: NURSE PRACTITIONER

## 2021-10-05 PROCEDURE — 4040F PNEUMOC VAC/ADMIN/RCVD: CPT | Performed by: NURSE PRACTITIONER

## 2021-10-05 ASSESSMENT — ENCOUNTER SYMPTOMS
BLOOD IN STOOL: 0
SHORTNESS OF BREATH: 0
NAUSEA: 0
VOMITING: 0
COLOR CHANGE: 0
CONSTIPATION: 0
TROUBLE SWALLOWING: 0
WHEEZING: 0
BACK PAIN: 0
SORE THROAT: 0
DIARRHEA: 0
SINUS PRESSURE: 0
ABDOMINAL PAIN: 0
COUGH: 0

## 2021-10-21 ENCOUNTER — TELEPHONE (OUTPATIENT)
Dept: PHARMACY | Facility: CLINIC | Age: 76
End: 2021-10-21

## 2021-10-21 NOTE — TELEPHONE ENCOUNTER
[facility] CLINICAL PHARMACY REVIEW: ADHERENCE REVIEW  Identified care gap per Kellee Appiah; [facility]: ACE/ARB, Diabetes and Statin adherence    Last Visit: 9/8/2021    ASSESSMENT  ACE/ARB ADHERENCE  Per Insurance Records through 10/11/2021 ([place] = 99%; Passed in 2021):   Losartan last filled on 8/26/2021 for 90 day supply. Next refill due: 11/24/2021    Per Reconciled Dispense Report:  Confirms above     BP Readings from Last 3 Encounters:   10/05/21 110/62   09/08/21 134/80   05/10/21 138/78     Estimated Creatinine Clearance: 57 mL/min (based on SCr of 1 mg/dL). DIABETES ADHERENCE  Per Insurance Records through 10/11/2021 ([place] = 75%; Potential Fail Date: 10/25/2021):   Metformin last filled on 7/21/2021 for 90 day supply. Next refill due: 10/19/2021    Per Reconciled Dispense Report:  Confirms above    [facility]:   Metformin last picked up on 7/21/2021 for 90 day supply. 1 refills remaining. Billed through [employer] Value Date    LABA1C 5.8 09/08/2021    LABA1C 5.8 03/19/2021    LABA1C 5.8 09/23/2020     NOTE A1c <9%    STATIN ADHERENCE  Per Insurance Records through 10/11/2021 ([place] = 96%; Passed in 2021):   Simvastatin last filled on 9/23/2021 for 90 day supply.  Next refill due: 12/22/2021    Per Reconciled Dispense Report:  Confirms above    Lab Results   Component Value Date    CHOL 191 09/23/2020    TRIG 177 (H) 09/23/2020    HDL 55 09/08/2021    LDLCALC 109 (H) 09/08/2021     ALT   Date Value Ref Range Status   09/08/2021 7 (L) 10 - 40 U/L Final     AST   Date Value Ref Range Status   09/08/2021 9 (L) 15 - 37 U/L Final     The 10-year ASCVD risk score (Jana Serna, et al., 2013) is: 34.5%    Values used to calculate the score:      Age: 66 years      Sex: Female      Is Non- : No      Diabetic: Yes      Tobacco smoker: No      Systolic Blood Pressure: 622 mmHg      Is BP treated: Yes      HDL Cholesterol: 55 mg/dL      Total Cholesterol: 205 mg/dL     PLAN  The following are interventions that have been identified:   Patient is due for a fill on metfromin. Fill date and fail date within a few days of eachother. Spoke to patient who is taking boht medications with no issues in adherence or side effect. Called in to [facility] to process refill for patient. Confirmed she will  later this week, [facility]. Reached patient to review. Verified medication instructions and Education provided.       Future Appointments   Date Time Provider Troy Fitzgerald   10/26/2021  7:45 AM SCHEDULE, [place] REMOTE TRANSMISSION [facility]   2022  7:45 AM SCHEDULE, [place] REMOTE TRANSMISSION [facility]   3/9/2022  1:20 PM Jg Figueroa MD [facility] [place] [facility] [place]   2022  7:45 AM SCHEDULE, [place] REMOTE TRANSMISSION [facility]   2022  7:45 AM SCHEDULE, [facility] REMOTE TRANSMISSION [facility]       James Parnell, PharmD  PGY1 Pharmacy Resident   [place] [address]  Department, toll free: [phone]    For Pharmacy Admin Tracking Only     CPA in place:  No   Recommendation Provided To: Patient/Caregiver: 1 via Telephone and Pharmacy: 1   Intervention Detail: Adherence Monitorin   Gap Closed?: Yes    Intervention Accepted By: Patient/Caregiver: 1 and Pharmacy: 1   Time Spent (min): 15

## 2021-10-21 NOTE — TELEPHONE ENCOUNTER
Agree with PharmD assessment and plan. Will zafar for f/u on 10/28/21 to confirm metformin was picked up from the pharmacy.      Gilford Courser, PharmD, [place] // Department, toll free [phone], option 1

## 2021-10-26 ENCOUNTER — NURSE ONLY (OUTPATIENT)
Dept: CARDIOLOGY CLINIC | Age: 76
End: 2021-10-26
Payer: MEDICARE

## 2021-10-26 DIAGNOSIS — I44.2 CHB (COMPLETE HEART BLOCK) (HCC): ICD-10-CM

## 2021-10-26 DIAGNOSIS — Z95.0 PACEMAKER: ICD-10-CM

## 2021-10-26 PROCEDURE — 93294 REM INTERROG EVL PM/LDLS PM: CPT | Performed by: INTERNAL MEDICINE

## 2021-10-26 PROCEDURE — 93296 REM INTERROG EVL PM/IDS: CPT | Performed by: INTERNAL MEDICINE

## 2021-10-26 NOTE — PROGRESS NOTES
We received remote transmission from patient's dual chamber pacemaker monitor at home. Transmission shows normal sensing and pacing function. No new arrhythmias/events recorded. Ap 43.5% (no RR, DDD only)   80.1% (MVP Off)  Echo 09.2020 showed EF of 50-55%. EP physician will review. See interrogation under cardiology tab in the [address] [place] [address] [address] field for more details. Will continue to monitor remotely.

## 2021-11-18 DIAGNOSIS — R73.02 IMPAIRED GLUCOSE TOLERANCE: ICD-10-CM

## 2021-11-18 DIAGNOSIS — E78.2 MIXED HYPERLIPIDEMIA: ICD-10-CM

## 2021-11-18 DIAGNOSIS — F41.1 GENERALIZED ANXIETY DISORDER: ICD-10-CM

## 2021-11-18 RX ORDER — METFORMIN HYDROCHLORIDE 500 MG/1
TABLET, EXTENDED RELEASE ORAL
Qty: 90 TABLET | Refills: 1 | Status: SHIPPED | OUTPATIENT
Start: 2021-11-18 | End: 2022-07-22

## 2021-11-18 RX ORDER — SIMVASTATIN 20 MG
TABLET ORAL
Qty: 90 TABLET | Refills: 0 | Status: SHIPPED | OUTPATIENT
Start: 2021-11-18 | End: 2022-02-24

## 2021-11-18 RX ORDER — ESCITALOPRAM OXALATE 10 MG/1
TABLET ORAL
Qty: 90 TABLET | Refills: 1 | Status: SHIPPED | OUTPATIENT
Start: 2021-11-18 | End: 2022-04-25

## 2021-12-08 RX ORDER — LOSARTAN POTASSIUM 25 MG/1
TABLET ORAL
Qty: 90 TABLET | Refills: 1 | Status: SHIPPED | OUTPATIENT
Start: 2021-12-08 | End: 2022-04-25

## 2021-12-30 DIAGNOSIS — F41.1 GENERALIZED ANXIETY DISORDER: ICD-10-CM

## 2021-12-30 RX ORDER — BUPROPION HYDROCHLORIDE 150 MG/1
TABLET ORAL
Qty: 90 TABLET | Refills: 3 | Status: SHIPPED | OUTPATIENT
Start: 2021-12-30 | End: 2022-11-02 | Stop reason: ALTCHOICE

## 2022-01-25 ENCOUNTER — NURSE ONLY (OUTPATIENT)
Dept: CARDIOLOGY CLINIC | Age: 77
End: 2022-01-25
Payer: MEDICARE

## 2022-01-25 DIAGNOSIS — I44.2 CHB (COMPLETE HEART BLOCK) (HCC): ICD-10-CM

## 2022-01-25 DIAGNOSIS — Z95.0 PACEMAKER: ICD-10-CM

## 2022-01-26 NOTE — PROGRESS NOTES
We received remote transmission from patient's dual chamber pacemaker monitor at home. Transmission shows normal sensing and pacing function. No new arrhythmias/events recorded. Ap 30.6%   73.4% (MVP Off)  Echo 09.2020 showed EF of 50-55%. EP physician will review. See interrogation under cardiology tab in the [address] [place] [address] [address] field for more details. Will continue to monitor remotely.

## 2022-01-27 PROCEDURE — 93296 REM INTERROG EVL PM/IDS: CPT | Performed by: INTERNAL MEDICINE

## 2022-01-27 PROCEDURE — 93294 REM INTERROG EVL PM/LDLS PM: CPT | Performed by: INTERNAL MEDICINE

## 2022-02-07 RX ORDER — METOPROLOL SUCCINATE 25 MG/1
TABLET, EXTENDED RELEASE ORAL
Qty: 45 TABLET | Refills: 3 | Status: SHIPPED | OUTPATIENT
Start: 2022-02-07

## 2022-02-21 DIAGNOSIS — E55.9 VITAMIN D DEFICIENCY: ICD-10-CM

## 2022-02-21 RX ORDER — ERGOCALCIFEROL 1.25 MG/1
CAPSULE ORAL
Qty: 12 CAPSULE | Refills: 3 | OUTPATIENT
Start: 2022-02-21

## 2022-02-24 DIAGNOSIS — E78.2 MIXED HYPERLIPIDEMIA: ICD-10-CM

## 2022-02-24 RX ORDER — SIMVASTATIN 20 MG
TABLET ORAL
Qty: 90 TABLET | Refills: 0 | Status: SHIPPED | OUTPATIENT
Start: 2022-02-24 | End: 2022-04-25

## 2022-03-09 ENCOUNTER — OFFICE VISIT (OUTPATIENT)
Dept: INTERNAL MEDICINE CLINIC | Age: 77
End: 2022-03-09
Payer: MEDICARE

## 2022-03-09 VITALS
SYSTOLIC BLOOD PRESSURE: 120 MMHG | HEIGHT: 66 IN | BODY MASS INDEX: 35.36 KG/M2 | WEIGHT: 220 LBS | HEART RATE: 56 BPM | DIASTOLIC BLOOD PRESSURE: 70 MMHG

## 2022-03-09 DIAGNOSIS — E55.9 VITAMIN D DEFICIENCY: ICD-10-CM

## 2022-03-09 DIAGNOSIS — I48.0 PAROXYSMAL ATRIAL FIBRILLATION (HCC): ICD-10-CM

## 2022-03-09 DIAGNOSIS — I44.2 COMPLETE HEART BLOCK (HCC): ICD-10-CM

## 2022-03-09 DIAGNOSIS — I42.9 CARDIOMYOPATHY, UNSPECIFIED TYPE (HCC): ICD-10-CM

## 2022-03-09 DIAGNOSIS — N18.30 STAGE 3 CHRONIC KIDNEY DISEASE, UNSPECIFIED WHETHER STAGE 3A OR 3B CKD (HCC): Primary | ICD-10-CM

## 2022-03-09 DIAGNOSIS — E78.2 MIXED HYPERLIPIDEMIA: ICD-10-CM

## 2022-03-09 DIAGNOSIS — E66.01 SEVERE OBESITY (BMI 35.0-39.9) WITH COMORBIDITY (HCC): ICD-10-CM

## 2022-03-09 DIAGNOSIS — R41.3 MEMORY CHANGES: ICD-10-CM

## 2022-03-09 DIAGNOSIS — E11.9 TYPE 2 DIABETES MELLITUS WITHOUT COMPLICATION, WITHOUT LONG-TERM CURRENT USE OF INSULIN (HCC): ICD-10-CM

## 2022-03-09 DIAGNOSIS — K21.9 GASTROESOPHAGEAL REFLUX DISEASE, UNSPECIFIED WHETHER ESOPHAGITIS PRESENT: ICD-10-CM

## 2022-03-09 DIAGNOSIS — I73.9 PAD (PERIPHERAL ARTERY DISEASE) (HCC): ICD-10-CM

## 2022-03-09 LAB
A/G RATIO: 2 (ref 1.1–2.2)
ALBUMIN SERPL-MCNC: 4.6 G/DL (ref 3.4–5)
ALP BLD-CCNC: 74 U/L (ref 40–129)
ALT SERPL-CCNC: 6 U/L (ref 10–40)
ANION GAP SERPL CALCULATED.3IONS-SCNC: 13 MMOL/L (ref 3–16)
AST SERPL-CCNC: 9 U/L (ref 15–37)
BASOPHILS ABSOLUTE: 0.1 K/UL (ref 0–0.2)
BASOPHILS RELATIVE PERCENT: 0.8 %
BILIRUB SERPL-MCNC: 0.5 MG/DL (ref 0–1)
BUN BLDV-MCNC: 15 MG/DL (ref 7–20)
CALCIUM SERPL-MCNC: 9.3 MG/DL (ref 8.3–10.6)
CHLORIDE BLD-SCNC: 98 MMOL/L (ref 99–110)
CHOLESTEROL, TOTAL: 171 MG/DL (ref 0–199)
CO2: 26 MMOL/L (ref 21–32)
CREAT SERPL-MCNC: 1.1 MG/DL (ref 0.6–1.2)
EOSINOPHILS ABSOLUTE: 0.2 K/UL (ref 0–0.6)
EOSINOPHILS RELATIVE PERCENT: 2.4 %
GFR AFRICAN AMERICAN: 58
GFR NON-AFRICAN AMERICAN: 48
GLUCOSE BLD-MCNC: 102 MG/DL (ref 70–99)
HCT VFR BLD CALC: 46.6 % (ref 36–48)
HDLC SERPL-MCNC: 52 MG/DL (ref 40–60)
HEMOGLOBIN: 15.5 G/DL (ref 12–16)
LDL CHOLESTEROL CALCULATED: 89 MG/DL
LYMPHOCYTES ABSOLUTE: 1.9 K/UL (ref 1–5.1)
LYMPHOCYTES RELATIVE PERCENT: 23.1 %
MAGNESIUM: 2.3 MG/DL (ref 1.8–2.4)
MCH RBC QN AUTO: 29.7 PG (ref 26–34)
MCHC RBC AUTO-ENTMCNC: 33.2 G/DL (ref 31–36)
MCV RBC AUTO: 89.4 FL (ref 80–100)
MONOCYTES ABSOLUTE: 0.8 K/UL (ref 0–1.3)
MONOCYTES RELATIVE PERCENT: 10.4 %
NEUTROPHILS ABSOLUTE: 5.1 K/UL (ref 1.7–7.7)
NEUTROPHILS RELATIVE PERCENT: 63.3 %
PDW BLD-RTO: 14.1 % (ref 12.4–15.4)
PLATELET # BLD: 263 K/UL (ref 135–450)
PMV BLD AUTO: 8 FL (ref 5–10.5)
POTASSIUM SERPL-SCNC: 4.7 MMOL/L (ref 3.5–5.1)
RBC # BLD: 5.21 M/UL (ref 4–5.2)
SODIUM BLD-SCNC: 137 MMOL/L (ref 136–145)
TOTAL PROTEIN: 6.9 G/DL (ref 6.4–8.2)
TRIGL SERPL-MCNC: 152 MG/DL (ref 0–150)
TSH REFLEX: 1.09 UIU/ML (ref 0.27–4.2)
VITAMIN B-12: 833 PG/ML (ref 211–911)
VLDLC SERPL CALC-MCNC: 30 MG/DL
WBC # BLD: 8.1 K/UL (ref 4–11)

## 2022-03-09 PROCEDURE — G8484 FLU IMMUNIZE NO ADMIN: HCPCS | Performed by: FAMILY MEDICINE

## 2022-03-09 PROCEDURE — 1090F PRES/ABSN URINE INCON ASSESS: CPT | Performed by: FAMILY MEDICINE

## 2022-03-09 PROCEDURE — 1036F TOBACCO NON-USER: CPT | Performed by: FAMILY MEDICINE

## 2022-03-09 PROCEDURE — G8399 PT W/DXA RESULTS DOCUMENT: HCPCS | Performed by: FAMILY MEDICINE

## 2022-03-09 PROCEDURE — G8427 DOCREV CUR MEDS BY ELIG CLIN: HCPCS | Performed by: FAMILY MEDICINE

## 2022-03-09 PROCEDURE — G8417 CALC BMI ABV UP PARAM F/U: HCPCS | Performed by: FAMILY MEDICINE

## 2022-03-09 PROCEDURE — 99214 OFFICE O/P EST MOD 30 MIN: CPT | Performed by: FAMILY MEDICINE

## 2022-03-09 PROCEDURE — 4040F PNEUMOC VAC/ADMIN/RCVD: CPT | Performed by: FAMILY MEDICINE

## 2022-03-09 PROCEDURE — 1123F ACP DISCUSS/DSCN MKR DOCD: CPT | Performed by: FAMILY MEDICINE

## 2022-03-09 RX ORDER — ERGOCALCIFEROL (VITAMIN D2) 1250 MCG
50000 CAPSULE ORAL WEEKLY
Qty: 12 CAPSULE | Refills: 3 | Status: SHIPPED | OUTPATIENT
Start: 2022-03-09

## 2022-03-09 NOTE — PATIENT INSTRUCTIONS
See if you can find a yoga or stretching program for seniors. You and your  can do this together. Bedtime yoga by Pedro Lerma is a free You tube progragm    Colonoscopy:  Had done in 2013;  Due to your father's history, you should have had done in 2018.

## 2022-03-09 NOTE — PROGRESS NOTES
3/9/2022    Marciana Seip Ruter (:  1945) is a 64 y.o. female, here for evaluation of the following chief complaint(s):  6 Month Follow-Up      ASSESSMENT/PLAN:    1. Stage 3 chronic kidney disease, unspecified whether stage 3a or 3b CKD (HCC)  Est GFR 44-54 and fluctuates. Probably 3a   Avoid NSAIDs and COXIs  - Comprehensive Metabolic Panel    2. Type 2 diabetes mellitus without complication, without long-term current use of insulin ([facility])  Last A1C 5.8 = under control.  - Hemoglobin A1C    3. Cardiomyopathy, unspecified type ([facility]  Recovered cardiomyopathy:  Was 25-30% and now 50-55%    4. Complete heart block (HCC)  Had permanent pacemaker and doing OK      5. Severe obesity (BMI 35.0-39. 9) with comorbidity ([facility]  Encouraged healthy diet and weight loss. She is not motivated to make changes on this right now. 6. Vitamin D deficiency  On high dose. Last D3 58.6  - ergocalciferol (DRISDOL) 1.25 MG (52167 UT) capsule; Take 1 capsule by mouth once a week  Dispense: 12 capsule; Refill: 3    7. Memory changes  She is not really concerned but my MA and I have noted a decline. Encouraged her to return for memory testing.    - TSH with Reflex    8. Gastroesophageal reflux disease, unspecified whether esophagitis present  On daily PPI   - Vitamin B12  - Magnesium  - CBC with Auto Differential    9. Mixed hyperlipidemia  Last LDL above goal.  On statin low dose = simvastatin 20 mg. She does not want to change to other statin. - Lipid Panel    10. Paroxysmal atrial fibrillation ([facility]  Sounds irreg today but hard to hear due to body habitus and BB. Seeing EP cardiology. Unable to view rhythm interrogation     11. PAD (peripheral artery disease) ([facility])  Mild per prior arterial dopplers. She is not very active and has no symptoms. Need to keep BP controlled and LDL < 100. FOLLOW UP:  Return in about 6 months (around 2022) for [facility] memory screening and Medicare Wellness.   She did not want to come back sooner than 6 months    SUBJECTIVE/OBJECTIVE:    HPI  Thinks she is depressed. Does not feel like doing much  Legs hurt and has general aches and pains.  is not doing well and his sister is dying. He is 3years older than she. She feels this is mostly stress and pandemic related. She is optimistic it will improve with better weather and the pandemic lessening. No other c/o. Anil Jameson See Assessment for other issues addressed. Outpatient Medications Marked as Taking for the 3/9/22 encounter (Office Visit) with Marilyn Rios MD   Medication Sig Dispense Refill    simvastatin (ZOCOR) 20 MG tablet TAKE 1 TABLET BY MOUTH EVERY DAY AT NIGHT 90 tablet 0    metoprolol succinate (TOPROL XL) 25 MG extended release tablet TAKE 1/2 TABLET BY MOUTH EVERY DAY 45 tablet 3    buPROPion (WELLBUTRIN XL) 150 MG extended release tablet TAKE 1 TABLET BY MOUTH EVERY DAY 90 tablet 3    losartan (COZAAR) 25 MG tablet TAKE 1 TABLET BY MOUTH EVERY DAY 90 tablet 1    metFORMIN (GLUCOPHAGE-XR) 500 MG extended release tablet TAKE 1 TABLET BY MOUTH DAILY 90 tablet 1    escitalopram (LEXAPRO) 10 MG tablet TAKE 1 TABLET BY MOUTH EVERY DAY 90 tablet 1    ELIQUIS 5 MG TABS tablet TAKE 1 TABLET BY MOUTH TWICE A DAY 60 tablet 3    ergocalciferol (DRISDOL) 1.25 MG (25297 UT) capsule Take 1 capsule by mouth once a week 12 capsule 3    gabapentin (NEURONTIN) 300 MG capsule TAKE 1 CAPSULE BY MOUTH THREE TIMES A  capsule 1    omeprazole (PRILOSEC) 20 MG capsule Take 20 mg by mouth daily as needed            Review of Systems        Vitals:    03/09/22 1313   BP: 120/70   Pulse: 56   Weight: 220 lb (99.8 kg)   Height: 5' 6\" (1.676 m)       Wt Readings from Last 3 Encounters:   03/09/22 220 lb (99.8 kg)   10/05/21 224 lb (101.6 kg)   09/08/21 227 lb (103 kg)       BP Readings from Last 3 Encounters:   03/09/22 120/70   10/05/21 110/62   09/08/21 134/80       Physical Exam  Vitals reviewed. Constitutional:       General: She is not in acute distress. Appearance: Normal appearance. She is well-developed. She is morbidly obese. She is not diaphoretic. Eyes:      General: No scleral icterus. Neck:      Thyroid: No thyroid mass or thyromegaly. Vascular: No carotid bruit. Cardiovascular:      Rate and Rhythm: Normal rate. Rhythm irregularly irregular. Heart sounds: S1 normal and S2 normal. Heart sounds are distant. No murmur heard. Comments: Did not sound regular on heart rate but very soft from beta blocker and body habitus. Pulmonary:      Effort: Pulmonary effort is normal. No respiratory distress. Breath sounds: Normal breath sounds. No decreased breath sounds, wheezing, rhonchi or rales. Abdominal:      General: Bowel sounds are normal. There is no abdominal bruit. Palpations: Abdomen is soft. There is no hepatomegaly or mass. Tenderness: There is no abdominal tenderness. Musculoskeletal:      Cervical back: Neck supple. Right lower leg: No edema. Left lower leg: No edema. Lymphadenopathy:      Cervical: No cervical adenopathy. Skin:     General: Skin is warm and dry. Coloration: Skin is not pale. Nails: There is no clubbing. Neurological:      Mental Status: She is alert and oriented to person, place, and time. Motor: No tremor or abnormal muscle tone. Coordination: Coordination normal.      Gait: Gait normal.   Psychiatric:         Speech: Speech normal.         Behavior: Behavior normal.           An electronic signature was used to authenticate this note.     Jaelyn Brooks MD

## 2022-03-10 LAB
ESTIMATED AVERAGE GLUCOSE: 122.6 MG/DL
HBA1C MFR BLD: 5.9 %

## 2022-03-23 RX ORDER — APIXABAN 5 MG/1
TABLET, FILM COATED ORAL
Qty: 180 TABLET | Refills: 2 | Status: SHIPPED | OUTPATIENT
Start: 2022-03-23

## 2022-04-13 DIAGNOSIS — M79.10 MYALGIA: ICD-10-CM

## 2022-04-13 RX ORDER — GABAPENTIN 300 MG/1
CAPSULE ORAL
Qty: 270 CAPSULE | Refills: 0 | Status: SHIPPED | OUTPATIENT
Start: 2022-04-13 | End: 2022-06-20

## 2022-04-23 DIAGNOSIS — F41.1 GENERALIZED ANXIETY DISORDER: ICD-10-CM

## 2022-04-23 DIAGNOSIS — E78.2 MIXED HYPERLIPIDEMIA: ICD-10-CM

## 2022-04-25 RX ORDER — ESCITALOPRAM OXALATE 10 MG/1
TABLET ORAL
Qty: 90 TABLET | Refills: 1 | Status: SHIPPED | OUTPATIENT
Start: 2022-04-25

## 2022-04-25 RX ORDER — LOSARTAN POTASSIUM 25 MG/1
TABLET ORAL
Qty: 90 TABLET | Refills: 1 | Status: SHIPPED | OUTPATIENT
Start: 2022-04-25

## 2022-04-25 RX ORDER — SIMVASTATIN 20 MG
TABLET ORAL
Qty: 90 TABLET | Refills: 0 | Status: SHIPPED | OUTPATIENT
Start: 2022-04-25 | End: 2022-08-29

## 2022-04-26 ENCOUNTER — NURSE ONLY (OUTPATIENT)
Dept: CARDIOLOGY CLINIC | Age: 77
End: 2022-04-26
Payer: MEDICARE

## 2022-04-26 DIAGNOSIS — Z95.0 PACEMAKER: ICD-10-CM

## 2022-04-26 DIAGNOSIS — I49.5 SSS (SICK SINUS SYNDROME) (HCC): ICD-10-CM

## 2022-04-26 PROCEDURE — 93294 REM INTERROG EVL PM/LDLS PM: CPT | Performed by: INTERNAL MEDICINE

## 2022-04-26 PROCEDURE — 93296 REM INTERROG EVL PM/IDS: CPT | Performed by: INTERNAL MEDICINE

## 2022-04-27 NOTE — PROGRESS NOTES
We received remote transmission from patient's dual chamber pacemaker monitor at home. Transmission shows normal sensing and pacing function. No new arrhythmias/events recorded. Ap 55.3%   65.9% (MVP Off)  Echo 09.2020 showed EF of 50-55%. EP physician will review. See interrogation under cardiology tab in the [address] [place] [address] [address] field for more details. Will continue to monitor remotely.

## 2022-05-13 NOTE — PROGRESS NOTES
Cardiology Follow Up    Referring Physician: Dr. Nuris Rainey     Chief Complaint:   Chief Complaint   Patient presents with    1 Year Follow Up     Yearly f/u         Subjective:   History of Present Illness:  Neil Gomez is a 64 y.o. female who presents with PMH significant for DM, CKD, TAZ on CPAP, HTN, anxiety, AFIB s/p ablation 2020 with post op pericardial effusion with normal LVEF 55-60% 20 with repeat echo revealing reduction in LVEF 25-30%, medical therapy initiated and repeat echo 20 showed LVEF had recovered 50-55%. Today, she returns in follow up. States feeling awful, like she doesn't want to do anything. States she is depressed. She sleeps well at night. Denies any chest pain SOB or BREWSTER. Taking all medication as prescribed with no adverse reactions. Prior cardiac testing:    EK/10/21 Electronic dual-chamber pacemaker  -possibly demand type   Pacemaker ECG, No further analysis         AAA screenin2021     FINDINGS:       Aorta:       Proximal: 1.9 x 2.1 cm       Mid: 2.1 x 2.0 cm       Distal: 1.9 x 2.0 cm           Iliacs:       Iliac arteries are patent and normal in caliber.  Atherosclerotic   calcifications seen in the iliacs bilaterally. Carotid doppler: 2021      Summary        The right internal carotid artery appears to have a 0-15% diameter reducing    stenosis based on velocity criteria.    The right vertebral artery demonstrates normal antegrade flow.    Limited visualization of the Internal Carotid Artery due to depth of vessel.    The left internal carotid artery appears to have a 0-15% diameter reducing    stenosis based on velocity criteria.    The left vertebral artery demonstrates normal antegrade flow.    Limited visualization of the Internal Carotid Artery due to depth of vessel.           Arterial doppler: 2021      Summary        Right LUISA: 1.11.  This is consistent with no significant PAD at rest. PTA    pressure was 155 and DPA pressure was 157.    The majority of the waveforms are triphasic throughout the right lower    extremity.    No significant stenosis noted in the lower right extremity.    Mild plaque formation demonstrated in the distal Superficial Femoral Artery    and distal Popliteal Artery.    Peroneal Artery was not well visualized.    Left LUISA: 1.18. This is consistent with no significant PAD at rest. PTA    pressure was 161 and DPA was 167.    The majority of the waveforms are triphasic throughout the left lower    extremity.    No significant stenosis noted in the left lower extremity.    Mild plaque formation demonstrated in the distal Superficial Femoral Artery    and Popliteal Artery. Echo: 9/16/20   Normal LV size with assymetric septal contraction due to RV pacing. EF   50-55%   Mild mitral regurgitation is present. The left atrium is normal in size. Normal right ventricular size and function. Pacer / ICD wire is visualized   in the right ventricle. Trivial tricuspid regurgitation. No pericardial effusion noted. Limited echo:2/5/20  Normal left ventricular size and wall thickness. Globally reduced left ventricular systolic function with an estimated   ejection fraction of 25-30%. Abnormal septal motion with hypokinesia   Indeterminate diastolic function. The left atrium appears dilated. Anterior pericardial fluid small   Pacer / ICD wire is visualized in the right ventricle. Right ventricular systolic function appears reduced   Mild to moderate mitral regurgitation. Very mild systolic flow reversal seen in the pulmonary views. Mild pulmonic and tricuspid regurgitation. Estimated pulmonary artery systolic pressure is 25 mmHg assuming a right   atrial pressure of 3 mmHg. Limited echo:1/14/20  Moderate pericardial effusion without tamponade   Pacer wire present in RV   Normal LV size with ; EF 40%   The IVC is dilated.  (3.4cm)   Dilated IVC with poor inspiration collapse consistent with elevated right   atrial pressure. Estimated right atrial pressure is 15 mmHg. Echo:4/30/19   Left ventricular cavity size is normal. Normal left ventricular wall   thickness. Overall left ventricular systolic function appears normal. Ejection fraction   is visually estimated to be 60%. Mitral annular calcification is present. Mild mitral regurgitation. No evidence of mitral stenosis. Normal right ventricular size and function. Stress Test: no prior hx     Cath: no prior hx       Past Medical History:   has a past medical history of Abnormal EKG, Cataract, Chronic kidney disease (CKD), stage III (moderate) ([facility]), DM (diabetes mellitus), type 2 ([facility]), Elbow fracture, Generalized anxiety disorder, GERD (gastroesophageal reflux disease), Herpes zoster, Hypertension, Intermittent atrial fibrillation ([facility]), Lipoma of skin, Mixed hyperlipidemia, Myalgia, Obesity, morbid ([facility]), Obstructive sleep apnea, Osteoarthritis, Pacemaker lead failure, PAD (peripheral artery disease) ([facility]), Pericarditis, Periodic limb movement disorder, Psoriasis, Seasonal allergic rhinitis, and Tubular adenoma of colon. Surgical History:   has a past surgical history that includes Cataract removal (2011); Elbow surgery (6/08); rubio and bso (cervix removed); Cholecystectomy, laparoscopic (6/11/15); and pacemaker placement (05/08/2019). Social History:   reports that she has never smoked. She has never used smokeless tobacco. She reports current alcohol use. She reports that she does not use drugs. Family History:  family history includes COPD in her mother; Cancer in her mother; Colon Cancer (age of onset: 54) in her father; Elevated Lipids in her son and son; Hypertension in her mother and son; Thyroid Disease in her maternal aunt.     Home Medications:  Were reviewed and are listed in nursing record and/or below  Prior to Admission medications    Medication Sig Start Date End Date Taking? Authorizing Provider   simvastatin (ZOCOR) 20 MG tablet TAKE 1 TABLET BY MOUTH EVERY DAY AT NIGHT 4/25/22  Yes Estephania Martin MD   escitalopram (LEXAPRO) 10 MG tablet TAKE 1 TABLET BY MOUTH EVERY DAY 4/25/22  Yes Estephania Martin MD   losartan (COZAAR) 25 MG tablet TAKE 1 TABLET BY MOUTH EVERY DAY 4/25/22  Yes Estephania Martin MD   gabapentin (NEURONTIN) 300 MG capsule TAKE 1 CAPSULE BY MOUTH THREE TIMES A DAY 4/13/22 7/13/22 Yes Estephania Martin MD   ELIQUIS 5 MG TABS tablet TAKE 1 TABLET BY MOUTH TWICE A DAY 3/23/22  Yes Dana Lynch, APRN - CNP   ergocalciferol (DRISDOL) 1.25 MG (09292 UT) capsule Take 1 capsule by mouth once a week 3/9/22  Yes Estephania Martin MD   metoprolol succinate (TOPROL XL) 25 MG extended release tablet TAKE 1/2 TABLET BY MOUTH EVERY DAY 2/7/22  Yes Dunia Stewart MD   buPROPion (WELLBUTRIN XL) 150 MG extended release tablet TAKE 1 TABLET BY MOUTH EVERY DAY 12/30/21  Yes Estephania Martin MD   metFORMIN (GLUCOPHAGE-XR) 500 MG extended release tablet TAKE 1 TABLET BY MOUTH DAILY 11/18/21  Yes Estephania Martin MD   diphenhydrAMINE (BENADRYL) 25 MG capsule Take 25 mg by mouth nightly as needed for Itching    Yes Historical Provider, MD   omeprazole (PRILOSEC) 20 MG capsule Take 20 mg by mouth daily as needed    Yes Historical Provider, MD   cetirizine (ZYRTEC) 10 MG tablet Take 10 mg by mouth daily as needed    Yes Historical Provider, MD          Allergies:  Pollen extract and Tape [adhesive tape]     Review of Systems:   · Constitutional: no unanticipated weight loss. There's been no change in energy level, sleep pattern, or activity level. No fevers, chills. · Eyes: No visual changes or diplopia. No scleral icterus. · ENT: No Headaches, hearing loss or vertigo. No mouth sores or sore throat. · Cardiovascular: as reviewed in HPI  · Respiratory: No cough or wheezing, no sputum production. No hemoptysis.     · Gastrointestinal: No abdominal pain, appetite loss, blood in stools. No change in bowel or bladder habits. · Genitourinary: No dysuria, trouble voiding, or hematuria. · Musculoskeletal:  No gait disturbance, no joint complaints. · Integumentary: No rash or pruritis. · Neurological: No headache, diplopia, change in muscle strength, numbness or tingling. · Psychiatric: No anxiety or depression. · Endocrine: No temperature intolerance. No excessive thirst, fluid intake, or urination. No tremor. · Hematologic/Lymphatic: No abnormal bruising or bleeding, blood clots or swollen lymph nodes. · Allergic/Immunologic: No nasal congestion or hives. Objective:   PHYSICAL EXAM:    Vitals:    05/16/22 1351   BP: 112/64   Pulse: (!) 49   SpO2: 94%    Weight: 217 lb 3.2 oz (98.5 kg)       General Appearance:  Alert, cooperative, no distress, appears stated age. Head:  Normocephalic, without obvious abnormality, atraumatic. Eyes:  Pupils equal and round. No scleral icterus. Mouth: Moist mucosa, no pharyngeal erythema. Nose: Nares normal. No drainage or sinus tenderness. Neck: Supple, symmetrical, trachea midline. No adenopathy. No tenderness/mass/nodules. No carotid bruit or elevated JVD. Lungs:   Clear to auscultation bilaterally, respirations unlabored. No wheeze, rales, or rhonchi. Chest Wall:  No tenderness or deformity. Heart:  Regular rate. S1/S2 normal. No murmur, rub, or gallop. Abdomen:   Soft, non-tender, bowel sounds active. Musculoskeletal: No muscle wasting or digital clubbing. Extremities: Extremities normal, atraumatic. No cyanosis or edema. Pulses: 2+ radial and carotid pulses, symmetric. Skin: No rashes or lesions. Pysch: Normal mood and affect. Alert and oriented x 4.    Neurologic: Normal gross motor and sensory exam.       Labs     Lab Results   Component Value Date    WBC 8.1 03/09/2022    RBC 5.21 03/09/2022    HGB 15.5 03/09/2022    HCT 46.6 03/09/2022    MCV 89.4 03/09/2022    RDW 14.1 03/09/2022     03/09/2022 Lab Results   Component Value Date     03/09/2022    K 4.7 03/09/2022    K 4.0 05/01/2019    CL 98 03/09/2022    CO2 26 03/09/2022    BUN 15 03/09/2022    CREATININE 1.1 03/09/2022    GFRAA 58 03/09/2022    GFRAA >60 01/28/2013    AGRATIO 2.0 03/09/2022    LABGLOM 48 03/09/2022    GLUCOSE 102 03/09/2022    PROT 6.9 03/09/2022    PROT 6.7 01/28/2013    CALCIUM 9.3 03/09/2022    BILITOT 0.5 03/09/2022    ALKPHOS 74 03/09/2022    AST 9 03/09/2022    ALT 6 03/09/2022      No results found for: PTINR    Lab Results   Component Value Date/Time    LABA1C 5.9 03/09/2022 02:25 PM     Lab Results   Component Value Date/Time    TROPONINI <0.01 04/30/2019 12:27 PM         CURRENT Medications:  Current Outpatient Medications on File Prior to Visit   Medication Sig Dispense Refill    simvastatin (ZOCOR) 20 MG tablet TAKE 1 TABLET BY MOUTH EVERY DAY AT NIGHT 90 tablet 0    escitalopram (LEXAPRO) 10 MG tablet TAKE 1 TABLET BY MOUTH EVERY DAY 90 tablet 1    losartan (COZAAR) 25 MG tablet TAKE 1 TABLET BY MOUTH EVERY DAY 90 tablet 1    gabapentin (NEURONTIN) 300 MG capsule TAKE 1 CAPSULE BY MOUTH THREE TIMES A  capsule 0    ELIQUIS 5 MG TABS tablet TAKE 1 TABLET BY MOUTH TWICE A  tablet 2    ergocalciferol (DRISDOL) 1.25 MG (77180 UT) capsule Take 1 capsule by mouth once a week 12 capsule 3    metoprolol succinate (TOPROL XL) 25 MG extended release tablet TAKE 1/2 TABLET BY MOUTH EVERY DAY 45 tablet 3    buPROPion (WELLBUTRIN XL) 150 MG extended release tablet TAKE 1 TABLET BY MOUTH EVERY DAY 90 tablet 3    metFORMIN (GLUCOPHAGE-XR) 500 MG extended release tablet TAKE 1 TABLET BY MOUTH DAILY 90 tablet 1    diphenhydrAMINE (BENADRYL) 25 MG capsule Take 25 mg by mouth nightly as needed for Itching       omeprazole (PRILOSEC) 20 MG capsule Take 20 mg by mouth daily as needed       cetirizine (ZYRTEC) 10 MG tablet Take 10 mg by mouth daily as needed        No current facility-administered medications on file prior to visit. Assessment and Plan   1) Atrial fibrillation  S/p ablation 1/13/20 post op hypotensive with moderate pericardial effusion with reduction in her LVEF which has now recovered. Asymptomatic   Continue Toprol XL and Eliquis therapy. EKG today, sinus rhythm - paced rhythm. 2) Medtronic dual chamber PPM implanted for Bradycardia with 3rd degree AVB on 4/30/19. Lead revision 9/2019. Device interrogated 4/26/22. Normal sensing and pacing. No arrhythmias   Continue to follow with EP    3) HTN, essential   BP stable today  CMP 3/9/22 creatinine WNL  AAA screening without evidence of aneurysm    4) Hyperlipidemia  On zocor with last lipid profile completed 3/9/22;   DM controlled  Encouraged low fat/chol/carb diet and walking program     5) Bilateral thigh cramping  On gabapentin. Mild plaque on duplex  Continue statin     6) DM with CKD  On metformin alone  Last A1c 5.9 3/9/22    7) TAZ on CPAP   She offers she has not used in a year. Encouraged f/u with pulmonology     8) Cardiomyopathy  Fully recovered by Echo in 2020  Continue losartan and  Toprol XL    Return to the office yearly with Elvis Melendez NP    Thank you for allowing us to participate in the care of Brooklynn Houser MD [address] [place], [place] [address]   (G): [phone]  [facility]): [phone]     This note was scribed in the presence of Dr. Anjelica Stoner MD by Keely Marte, RN    Physician Attestation:  The scribes documentation has been prepared under my direction and personally reviewed by me in its entirety. I confirm the note above accurately reflects all work, treatment, procedures, and medical decision making performed by me.     Electronically signed by Mary Phipps MD on 6/7/2022 at 4:48 PM

## 2022-05-16 ENCOUNTER — OFFICE VISIT (OUTPATIENT)
Dept: CARDIOLOGY CLINIC | Age: 77
End: 2022-05-16
Payer: MEDICARE

## 2022-05-16 VITALS
SYSTOLIC BLOOD PRESSURE: 112 MMHG | DIASTOLIC BLOOD PRESSURE: 64 MMHG | OXYGEN SATURATION: 94 % | BODY MASS INDEX: 34.91 KG/M2 | HEART RATE: 49 BPM | HEIGHT: 66 IN | WEIGHT: 217.2 LBS

## 2022-05-16 DIAGNOSIS — E78.5 HYPERLIPIDEMIA, UNSPECIFIED HYPERLIPIDEMIA TYPE: ICD-10-CM

## 2022-05-16 DIAGNOSIS — I42.9 CARDIOMYOPATHY, UNSPECIFIED TYPE (HCC): ICD-10-CM

## 2022-05-16 DIAGNOSIS — I10 ESSENTIAL HYPERTENSION: ICD-10-CM

## 2022-05-16 DIAGNOSIS — I48.0 PAF (PAROXYSMAL ATRIAL FIBRILLATION) (HCC): Primary | ICD-10-CM

## 2022-05-16 DIAGNOSIS — Z95.0 PACEMAKER: ICD-10-CM

## 2022-05-16 PROCEDURE — 99214 OFFICE O/P EST MOD 30 MIN: CPT | Performed by: INTERNAL MEDICINE

## 2022-05-16 PROCEDURE — G8399 PT W/DXA RESULTS DOCUMENT: HCPCS | Performed by: INTERNAL MEDICINE

## 2022-05-16 PROCEDURE — G8417 CALC BMI ABV UP PARAM F/U: HCPCS | Performed by: INTERNAL MEDICINE

## 2022-05-16 PROCEDURE — 1123F ACP DISCUSS/DSCN MKR DOCD: CPT | Performed by: INTERNAL MEDICINE

## 2022-05-16 PROCEDURE — 1036F TOBACCO NON-USER: CPT | Performed by: INTERNAL MEDICINE

## 2022-05-16 PROCEDURE — 1090F PRES/ABSN URINE INCON ASSESS: CPT | Performed by: INTERNAL MEDICINE

## 2022-05-16 PROCEDURE — G8427 DOCREV CUR MEDS BY ELIG CLIN: HCPCS | Performed by: INTERNAL MEDICINE

## 2022-05-16 PROCEDURE — 93000 ELECTROCARDIOGRAM COMPLETE: CPT | Performed by: INTERNAL MEDICINE

## 2022-05-16 NOTE — PATIENT INSTRUCTIONS
Patient Education        Heart-Healthy Diet: Care Instructions  Your Care Instructions     A heart-healthy diet has lots of vegetables, fruits, nuts, beans, and whole grains, and is low in salt. It limits foods that are high in saturated fat, such as meats, cheeses, and fried foods. It may be hard to change your diet,but even small changes can lower your risk of heart attack and heart disease. Follow-up care is a key part of your treatment and safety. Be sure to make and go to all appointments, and call your doctor if you are having problems. It's also a good idea to know your test results and keep alist of the medicines you take. How can you care for yourself at home? Watch your portions   Use food labels to learn what the recommended servings are for the foods you eat.  Eat only the number of calories you need to stay at a healthy weight. If you need to lose weight, eat fewer calories than your body burns (through exercise and other physical activity). Eat more fruits and vegetables   Eat a variety of fruit and vegetables every day. Dark green, deep orange, red, or yellow fruits and vegetables are especially good for you. Examples include spinach, carrots, peaches, and berries.  Keep carrots, celery, and other veggies handy for snacks. Buy fruit that is in season and store it where you can see it so that you will be tempted to eat it.  Cook dishes that have a lot of veggies in them, such as stir-fries and soups. Limit saturated fat   Read food labels, and try to avoid saturated fats. They increase your risk of heart disease.  Use olive or canola oil when you cook.  Bake, broil, grill, or steam foods instead of frying them.  Choose lean meats instead of high-fat meats such as hot dogs and sausages. Cut off all visible fat when you prepare meat.  Eat fish, skinless poultry, and meat alternatives such as soy products instead of high-fat meats.  Soy products, such as tofu, may be especially good for your heart.  Choose low-fat or fat-free milk and dairy products. Eat foods high in fiber   Eat a variety of grain products every day. Include whole-grain foods that have lots of fiber and nutrients. Examples of whole-grain foods include oats, whole wheat bread, and brown rice.  Buy whole-grain breads and cereals, instead of white bread or pastries. Limit salt and sodium   Limit how much salt and sodium you eat to help lower your blood pressure.  Taste food before you salt it. Add only a little salt when you think you need it. With time, your taste buds will adjust to less salt.  Eat fewer snack items, fast foods, and other high-salt, processed foods. Check food labels for the amount of sodium in packaged foods.  Choose low-sodium versions of canned goods (such as soups, vegetables, and beans). Limit sugar   Limit drinks and foods with added sugar. These include candy, desserts, and soda pop. Limit alcohol   Limit alcohol to no more than 2 drinks a day for men and 1 drink a day for women. Too much alcohol can cause health problems. When should you call for help? Watch closely for changes in your health, and be sure to contact your doctor if:     You would like help planning heart-healthy meals. Where can you learn more? Go to [email or web address] and sign in to your [employer] account. Enter V137 in the [facility] box to learn more about \"Heart-Healthy Diet: Care Instructions. \"     If you do not have an account, please click on the \"Sign Up Now\" link. Current as of: September 8, 2021               Content Version: 13.2  © 0422-5003 [employer]. Care instructions adapted under license by [facility] ([facility]). If you have questions about a medical condition or this instruction, always ask your healthcare professional. [place] [address] any warranty or liability for your use of this information.

## 2022-06-17 DIAGNOSIS — M79.10 MYALGIA: ICD-10-CM

## 2022-06-20 RX ORDER — GABAPENTIN 300 MG/1
CAPSULE ORAL
Qty: 270 CAPSULE | Refills: 0 | Status: SHIPPED | OUTPATIENT
Start: 2022-06-20 | End: 2022-09-20

## 2022-07-22 DIAGNOSIS — R73.02 IMPAIRED GLUCOSE TOLERANCE: ICD-10-CM

## 2022-07-22 RX ORDER — METFORMIN HYDROCHLORIDE 500 MG/1
TABLET, EXTENDED RELEASE ORAL
Qty: 90 TABLET | Refills: 0 | Status: SHIPPED | OUTPATIENT
Start: 2022-07-22

## 2022-07-26 ENCOUNTER — NURSE ONLY (OUTPATIENT)
Dept: CARDIOLOGY CLINIC | Age: 77
End: 2022-07-26
Payer: MEDICARE

## 2022-07-26 DIAGNOSIS — Z95.0 PACEMAKER: ICD-10-CM

## 2022-07-26 DIAGNOSIS — I49.5 SSS (SICK SINUS SYNDROME) (HCC): Primary | ICD-10-CM

## 2022-07-26 PROCEDURE — 93294 REM INTERROG EVL PM/LDLS PM: CPT | Performed by: INTERNAL MEDICINE

## 2022-07-26 PROCEDURE — 93296 REM INTERROG EVL PM/IDS: CPT | Performed by: INTERNAL MEDICINE

## 2022-07-26 NOTE — PROGRESS NOTES
We received remote transmission from patient's dual chamber pacemaker monitor at home. Transmission shows normal sensing and pacing function. No new arrhythmias/events recorded. Ap 57.4%   64.3% (MVP Off)  Echo 09.2020 showed EF of 50-55%. EP physician will review. See interrogation under cardiology tab in the [address] [place] [address] [address] field for more details. Will continue to monitor remotely.      (End of 91-day monitoring period 7/26/22)

## 2022-08-29 DIAGNOSIS — E78.2 MIXED HYPERLIPIDEMIA: ICD-10-CM

## 2022-08-29 RX ORDER — SIMVASTATIN 20 MG
TABLET ORAL
Qty: 90 TABLET | Refills: 2 | Status: SHIPPED | OUTPATIENT
Start: 2022-08-29

## 2022-11-02 ENCOUNTER — OFFICE VISIT (OUTPATIENT)
Dept: INTERNAL MEDICINE CLINIC | Age: 77
End: 2022-11-02
Payer: MEDICARE

## 2022-11-02 VITALS
BODY MASS INDEX: 34.67 KG/M2 | OXYGEN SATURATION: 96 % | DIASTOLIC BLOOD PRESSURE: 74 MMHG | HEART RATE: 40 BPM | SYSTOLIC BLOOD PRESSURE: 126 MMHG | WEIGHT: 214.8 LBS

## 2022-11-02 VITALS
WEIGHT: 214.73 LBS | BODY MASS INDEX: 34.51 KG/M2 | HEIGHT: 66 IN | DIASTOLIC BLOOD PRESSURE: 74 MMHG | SYSTOLIC BLOOD PRESSURE: 126 MMHG

## 2022-11-02 DIAGNOSIS — E11.9 TYPE 2 DIABETES MELLITUS WITHOUT COMPLICATION, WITHOUT LONG-TERM CURRENT USE OF INSULIN (HCC): ICD-10-CM

## 2022-11-02 DIAGNOSIS — R41.3 MEMORY IMPAIRMENT: ICD-10-CM

## 2022-11-02 DIAGNOSIS — R00.1 BRADYCARDIA: Primary | ICD-10-CM

## 2022-11-02 DIAGNOSIS — F41.1 GENERALIZED ANXIETY DISORDER: ICD-10-CM

## 2022-11-02 DIAGNOSIS — Z00.00 MEDICARE ANNUAL WELLNESS VISIT, SUBSEQUENT: Primary | ICD-10-CM

## 2022-11-02 DIAGNOSIS — F33.0 MILD RECURRENT MAJOR DEPRESSION (HCC): ICD-10-CM

## 2022-11-02 DIAGNOSIS — Z79.899 CURRENT USE OF PROTON PUMP INHIBITOR: ICD-10-CM

## 2022-11-02 DIAGNOSIS — E78.2 MIXED HYPERLIPIDEMIA: ICD-10-CM

## 2022-11-02 PROCEDURE — 1036F TOBACCO NON-USER: CPT | Performed by: FAMILY MEDICINE

## 2022-11-02 PROCEDURE — G8484 FLU IMMUNIZE NO ADMIN: HCPCS | Performed by: FAMILY MEDICINE

## 2022-11-02 PROCEDURE — G8427 DOCREV CUR MEDS BY ELIG CLIN: HCPCS | Performed by: FAMILY MEDICINE

## 2022-11-02 PROCEDURE — 3044F HG A1C LEVEL LT 7.0%: CPT | Performed by: FAMILY MEDICINE

## 2022-11-02 PROCEDURE — 1090F PRES/ABSN URINE INCON ASSESS: CPT | Performed by: FAMILY MEDICINE

## 2022-11-02 PROCEDURE — 3074F SYST BP LT 130 MM HG: CPT | Performed by: FAMILY MEDICINE

## 2022-11-02 PROCEDURE — 3078F DIAST BP <80 MM HG: CPT | Performed by: FAMILY MEDICINE

## 2022-11-02 PROCEDURE — G8399 PT W/DXA RESULTS DOCUMENT: HCPCS | Performed by: FAMILY MEDICINE

## 2022-11-02 PROCEDURE — G0439 PPPS, SUBSEQ VISIT: HCPCS | Performed by: FAMILY MEDICINE

## 2022-11-02 PROCEDURE — 99214 OFFICE O/P EST MOD 30 MIN: CPT | Performed by: FAMILY MEDICINE

## 2022-11-02 PROCEDURE — G8417 CALC BMI ABV UP PARAM F/U: HCPCS | Performed by: FAMILY MEDICINE

## 2022-11-02 PROCEDURE — 1123F ACP DISCUSS/DSCN MKR DOCD: CPT | Performed by: FAMILY MEDICINE

## 2022-11-02 SDOH — ECONOMIC STABILITY: FOOD INSECURITY: WITHIN THE PAST 12 MONTHS, THE FOOD YOU BOUGHT JUST DIDN'T LAST AND YOU DIDN'T HAVE MONEY TO GET MORE.: NEVER TRUE

## 2022-11-02 SDOH — ECONOMIC STABILITY: FOOD INSECURITY: WITHIN THE PAST 12 MONTHS, YOU WORRIED THAT YOUR FOOD WOULD RUN OUT BEFORE YOU GOT MONEY TO BUY MORE.: NEVER TRUE

## 2022-11-02 ASSESSMENT — PATIENT HEALTH QUESTIONNAIRE - PHQ9
3. TROUBLE FALLING OR STAYING ASLEEP: 0
4. FEELING TIRED OR HAVING LITTLE ENERGY: 2
8. MOVING OR SPEAKING SO SLOWLY THAT OTHER PEOPLE COULD HAVE NOTICED. OR THE OPPOSITE, BEING SO FIGETY OR RESTLESS THAT YOU HAVE BEEN MOVING AROUND A LOT MORE THAN USUAL: 0
SUM OF ALL RESPONSES TO PHQ QUESTIONS 1-9: 7
SUM OF ALL RESPONSES TO PHQ QUESTIONS 1-9: 8
10. IF YOU CHECKED OFF ANY PROBLEMS, HOW DIFFICULT HAVE THESE PROBLEMS MADE IT FOR YOU TO DO YOUR WORK, TAKE CARE OF THINGS AT HOME, OR GET ALONG WITH OTHER PEOPLE: 1
5. POOR APPETITE OR OVEREATING: 0
1. LITTLE INTEREST OR PLEASURE IN DOING THINGS: 3
9. THOUGHTS THAT YOU WOULD BE BETTER OFF DEAD, OR OF HURTING YOURSELF: 1
6. FEELING BAD ABOUT YOURSELF - OR THAT YOU ARE A FAILURE OR HAVE LET YOURSELF OR YOUR FAMILY DOWN: 0
SUM OF ALL RESPONSES TO PHQ QUESTIONS 1-9: 8
SUM OF ALL RESPONSES TO PHQ9 QUESTIONS 1 & 2: 5
SUM OF ALL RESPONSES TO PHQ QUESTIONS 1-9: 8
7. TROUBLE CONCENTRATING ON THINGS, SUCH AS READING THE NEWSPAPER OR WATCHING TELEVISION: 0
2. FEELING DOWN, DEPRESSED OR HOPELESS: 2

## 2022-11-02 ASSESSMENT — COLUMBIA-SUICIDE SEVERITY RATING SCALE - C-SSRS
1. WITHIN THE PAST MONTH, HAVE YOU WISHED YOU WERE DEAD OR WISHED YOU COULD GO TO SLEEP AND NOT WAKE UP?: YES
6. HAVE YOU EVER DONE ANYTHING, STARTED TO DO ANYTHING, OR PREPARED TO DO ANYTHING TO END YOUR LIFE?: NO
2. HAVE YOU ACTUALLY HAD ANY THOUGHTS OF KILLING YOURSELF?: NO

## 2022-11-02 ASSESSMENT — LIFESTYLE VARIABLES
HOW OFTEN DO YOU HAVE A DRINK CONTAINING ALCOHOL: 2-4 TIMES A MONTH
HOW MANY STANDARD DRINKS CONTAINING ALCOHOL DO YOU HAVE ON A TYPICAL DAY: 1 OR 2

## 2022-11-02 ASSESSMENT — SOCIAL DETERMINANTS OF HEALTH (SDOH): HOW HARD IS IT FOR YOU TO PAY FOR THE VERY BASICS LIKE FOOD, HOUSING, MEDICAL CARE, AND HEATING?: NOT HARD AT ALL

## 2022-11-02 NOTE — PROGRESS NOTES
Medicare Annual Wellness Visit    Arlette Melara is here for Medicare AWV    Assessment & Plan   Medicare annual wellness visit, subsequent    Recommendations for Preventive Services Due: see orders and patient instructions/AVS.  Recommended screening schedule for the next 5-10 years is provided to the patient in written form: see Patient Instructions/AVS.     No follow-ups on file. Subjective       Patient's complete Health Risk Assessment and screening values have been reviewed and are found in Flowsheets. The following problems were reviewed today and where indicated follow up appointments were made and/or referrals ordered. Positive Risk Factor Screenings with Interventions:    Fall Risk:  Do you feel unsteady or are you worried about falling? : (!) yes  2 or more falls in past year?: no  Fall with injury in past year?: no   Fall Risk Interventions:    Home safety tips provided     Depression:       Severity:1-4 = minimal depression, 5-9 = mild depression, 10-14 = moderate depression, 15-19 = moderately severe depression, 20-27 = severe depression  Depression Interventions: Addressed with PCP today.            General Health and ACP:  General  In general, how would you say your health is?: Good  In the past 7 days, have you experienced any of the following: New or Increased Pain, New or Increased Fatigue, Loneliness, Social Isolation, Stress or Anger?: (!) Yes  Select all that apply: (!) New or Increased Fatigue, Loneliness, Social Isolation, Stress, Anger (difficulties with )  Do you get the social and emotional support that you need?: Yes  Do you have a Living Will?: Yes    Advance Directives       Power of  Living Will ACP-Advance Directive ACP-Power of     Not on File Not on File Not on File Not on File          General Health Risk Interventions:  Stress: relaxation techniques discussed    Health Habits/Nutrition:  Physical Activity: Inactive    Days of Exercise per Week: 0 days    Minutes of Exercise per Session: 0 min     Have you lost any weight without trying in the past 3 months?: No  Body mass index: (!) 34.65  Have you seen the dentist within the past year?: Yes  Health Habits/Nutrition Interventions:  Inadequate physical activity:  educational materials provided to promote increased physical activity    Hearing/Vision:  Do you or your family notice any trouble with your hearing that hasn't been managed with hearing aids?: (!) Yes  Do you have difficulty driving, watching TV, or doing any of your daily activities because of your eyesight?: No  Have you had an eye exam within the past year?: (!) No (appt set up for next year)  No results found. Hearing/Vision Interventions:  Hearing concerns:  patient declines any further evaluation/treatment for hearing issues            Objective   Vitals:    11/02/22 1111   BP: 126/74   Weight: 214 lb 11.7 oz (97.4 kg)   Height: 5' 6\" (1.676 m)      Body mass index is 34.66 kg/m². Allergies   Allergen Reactions    Pollen Extract     Tape Blaze Splinter Tape] Itching     Prior to Visit Medications    Medication Sig Taking?  Authorizing Provider   simvastatin (ZOCOR) 20 MG tablet TAKE 1 TABLET BY MOUTH EVERY DAY AT NIGHT  Rey Yepez MD   metFORMIN (GLUCOPHAGE-XR) 500 MG extended release tablet TAKE 1 TABLET BY MOUTH EVERY DAY  Rey Yepez MD   gabapentin (NEURONTIN) 300 MG capsule TAKE 1 CAPSULE BY MOUTH THREE TIMES A DAY  Rey Yepez MD   escitalopram (LEXAPRO) 10 MG tablet TAKE 1 TABLET BY MOUTH EVERY DAY  Rey Yepez MD   losartan (COZAAR) 25 MG tablet TAKE 1 TABLET BY MOUTH EVERY DAY  Rey Yepez MD   ELIQUIS 5 MG TABS tablet TAKE 1 TABLET BY MOUTH TWICE A DAY  Dana Lynch, APRN - CNP   ergocalciferol (DRISDOL) 1.25 MG (01494 UT) capsule Take 1 capsule by mouth once a week  Rey Yepez MD   metoprolol succinate (TOPROL XL) 25 MG extended release tablet TAKE 1/2 TABLET BY MOUTH EVERY DAY Isaiah Canseco MD   diphenhydrAMINE (BENADRYL) 25 MG capsule Take 25 mg by mouth nightly as needed for Itching   Historical Provider, MD   omeprazole (PRILOSEC) 20 MG capsule Take 20 mg by mouth daily as needed   Historical Provider, MD   cetirizine (ZYRTEC) 10 MG tablet Take 10 mg by mouth daily as needed   Historical Provider, MD [place] (Including outside providers/suppliers regularly involved in providing care):   Patient Care Team:  Rey Yepez MD as PCP - Idalia Ang MD as PCP - [facility] Empaneled Provider     Reviewed and updated this visit:  Tobacco  Allergies  Meds  Med Hx  Surg Hx  Soc Hx  Fam Hx            I, Sukhi Mak RN, 11/2/2022, performed the documented evaluation under the direct supervision of the attending physician. This encounter was performed under myRey MDs, direct supervision, 11/2/2022.

## 2022-11-02 NOTE — PATIENT INSTRUCTIONS
Personalized Preventive Plan for Jesse Streeter - 11/2/2022  Medicare offers a range of preventive health benefits. Some of the tests and screenings are paid in full while other may be subject to a deductible, co-insurance, and/or copay. Some of these benefits include a comprehensive review of your medical history including lifestyle, illnesses that may run in your family, and various assessments and screenings as appropriate. After reviewing your medical record and screening and assessments performed today your provider may have ordered immunizations, labs, imaging, and/or referrals for you. A list of these orders (if applicable) as well as your Preventive Care list are included within your After Visit Summary for your review. Other Preventive Recommendations:    A preventive eye exam performed by an eye specialist is recommended every 1-2 years to screen for glaucoma; cataracts, macular degeneration, and other eye disorders. A preventive dental visit is recommended every 6 months. Try to get at least 150 minutes of exercise per week or 10,000 steps per day on a pedometer . Order or download the FREE \"Exercise & Physical Activity: Your Everyday Guide\" from [employer]. Call 5-337.573.7427 or search [employer] online. You need 8505-9961 mg of calcium and 0514-2327 IU of vitamin D per day. It is possible to meet your calcium requirement with diet alone, but a vitamin D supplement is usually necessary to meet this goal.  When exposed to the sun, use a sunscreen that protects against both UVA and UVB radiation with an SPF of 30 or greater. Reapply every 2 to 3 hours or after sweating, drying off with a towel, or swimming. Always wear a seat belt when traveling in a car. Always wear a helmet when riding a bicycle or motorcycle.

## 2022-11-02 NOTE — PATIENT INSTRUCTIONS
Your memory test is not great. You are higher than the dementia diagnosis. Your memory things may be worse due to your stress. I encourage you to check into Alzheimer's support groups.  [employer] for Alzheimer's Caregivers. You can order from the [facility] or buy it on [place]. Johana Eubanks RN will help with medication set up and see if you need to other help. I will send Dr. Gurmeet Carvajal a note about your heart rate. The buproprion can make the metoprolol dose act stronger so will stop it by taking every other day for 2 weeks. Then stop.

## 2022-11-02 NOTE — Clinical Note
Please  for Care Co-ordination. She has some memory issues and now depression related to stress with her 's health. She may benefit with help setting up pill box. If you can consider helping with reassess PHQ9 and SLUMS again if PHQ9 is better in the future. Thanks.

## 2022-11-02 NOTE — PROGRESS NOTES
2022    Carlos Aquino (:  1945) is a 64 y.o. female, here for evaluation of the following chief complaint(s):  6 Month Follow-Up (SLUMS test-/Wants you to re-evaluate her meds, I think she is talking about Wellbutrin)        ASSESSMENT/PLAN:    1. Bradycardia  Apical pulse and radial pulse are both slow. Suggested EKG but she did not have time today. Will have her do pacemaker check and I sent an affiliate message to her cardiologist.      2. Generalized anxiety disorder  Stable on  SSRI. 3. Mixed hyperlipidemia  Lab Results   Component Value Date    LDLCALC 89 2022     At goal and meeting medical guidelines. Continue treatment. - Comprehensive Metabolic Panel  - Lipid, Fasting    4. Type 2 diabetes mellitus without complication, without long-term current use of insulin ([facility])  A1C 5.9 = at goal.  Still on metformin. - Comprehensive Metabolic Panel  - Hemoglobin A1C    5. Memory impairment  She may have mild cognitive impairment but score is likely worse due to depression and stress. Will want to start med and complete further testing in the near future but will see if can get her pulse rate up and if she will feel better.    - TSH with Reflex    6. Current use of proton pump inhibitor  Monitor labs   - CBC with Auto Differential  - Vitamin B12  - Magnesium    7. Mild recurrent major depression ([address]  Will stop Wellbutrin since can make metoprolol blood level higher. Encouraged her to get her  evaluated and let me know if mood worsens or does not improve with seeing if heart rate needs to be higher. FOLLOW UP:  Return in about 9 weeks (around 2023). HPI  See Medicare AWV. There are additional medical issues    Her memory is poor but her 's is worse. She is under stress with her  Cici Couch. His memory is concerning. Her descritpion sounds like he has dementia. Forgets how to put his coat on correctly.   She needs to let his PCP know specifics about what she sees. She is frustrated with him and he is also very very [place]. She is tired a lot. No energy or motivation. On Lexapro and Wellbutrin which used to work well. She is fasting for labs today. Outpatient Medications Marked as Taking for the 11/2/22 encounter (Office Visit) with Alli Boston MD   Medication Sig Dispense Refill    simvastatin (ZOCOR) 20 MG tablet TAKE 1 TABLET BY MOUTH EVERY DAY AT NIGHT 90 tablet 2    metFORMIN (GLUCOPHAGE-XR) 500 MG extended release tablet TAKE 1 TABLET BY MOUTH EVERY DAY 90 tablet 0    escitalopram (LEXAPRO) 10 MG tablet TAKE 1 TABLET BY MOUTH EVERY DAY 90 tablet 1    losartan (COZAAR) 25 MG tablet TAKE 1 TABLET BY MOUTH EVERY DAY 90 tablet 1    ELIQUIS 5 MG TABS tablet TAKE 1 TABLET BY MOUTH TWICE A  tablet 2    ergocalciferol (DRISDOL) 1.25 MG (74040 UT) capsule Take 1 capsule by mouth once a week 12 capsule 3    metoprolol succinate (TOPROL XL) 25 MG extended release tablet TAKE 1/2 TABLET BY MOUTH EVERY DAY 45 tablet 3    buPROPion (WELLBUTRIN XL) 150 MG extended release tablet TAKE 1 TABLET BY MOUTH EVERY DAY 90 tablet 3    omeprazole (PRILOSEC) 20 MG capsule Take 20 mg by mouth daily as needed       cetirizine (ZYRTEC) 10 MG tablet Take 10 mg by mouth daily as needed          Review of Systems    OBJECTIVE:    Vitals:    11/02/22 1016   BP: 126/74   Site: Right Upper Arm   Position: Sitting   Cuff Size: Large Adult   Pulse: (!) 37   SpO2: 96%   Weight: 214 lb 12.8 oz (97.4 kg)       Physical Exam  Vitals reviewed. Constitutional:       Appearance: She is well-developed. Cardiovascular:      Rate and Rhythm: Bradycardia present. Rhythm irregularly irregular. Heart sounds: Heart sounds are distant. Pulmonary:      Effort: Pulmonary effort is normal.      Breath sounds: Normal breath sounds. Musculoskeletal:      Right lower leg: No edema. Left lower leg: No edema.    Skin:     General: Skin is warm and dry. Coloration: Skin is not pale. Findings: No erythema. Psychiatric:         Attention and Perception: Attention normal.         Behavior: Behavior normal.         Cognition and Memory: Memory is impaired. Comments: She actually seems less scattered than prior visit. Reviewed SLUMS and only scored 21/30. Mostly recall and short term memory deficitis. An electronic signature was used to authenticate this note.     Anival Aldridge MD

## 2022-11-03 LAB
A/G RATIO: 1.9 (ref 1.1–2.2)
ALBUMIN SERPL-MCNC: 4.4 G/DL (ref 3.4–5)
ALP BLD-CCNC: 71 U/L (ref 40–129)
ALT SERPL-CCNC: 8 U/L (ref 10–40)
ANION GAP SERPL CALCULATED.3IONS-SCNC: 13 MMOL/L (ref 3–16)
AST SERPL-CCNC: 9 U/L (ref 15–37)
BASOPHILS ABSOLUTE: 0.1 K/UL (ref 0–0.2)
BASOPHILS RELATIVE PERCENT: 1.2 %
BILIRUB SERPL-MCNC: 0.4 MG/DL (ref 0–1)
BUN BLDV-MCNC: 21 MG/DL (ref 7–20)
CALCIUM SERPL-MCNC: 9.4 MG/DL (ref 8.3–10.6)
CHLORIDE BLD-SCNC: 100 MMOL/L (ref 99–110)
CHOLESTEROL, FASTING: 216 MG/DL (ref 0–199)
CO2: 27 MMOL/L (ref 21–32)
CREAT SERPL-MCNC: 1.2 MG/DL (ref 0.6–1.2)
EOSINOPHILS ABSOLUTE: 0.2 K/UL (ref 0–0.6)
EOSINOPHILS RELATIVE PERCENT: 2.5 %
ESTIMATED AVERAGE GLUCOSE: 114 MG/DL
GFR SERPL CREATININE-BSD FRML MDRD: 46 ML/MIN/{1.73_M2}
GLUCOSE BLD-MCNC: 113 MG/DL (ref 70–99)
HBA1C MFR BLD: 5.6 %
HCT VFR BLD CALC: 47.1 % (ref 36–48)
HDLC SERPL-MCNC: 49 MG/DL (ref 40–60)
HEMOGLOBIN: 15.6 G/DL (ref 12–16)
LDL CHOLESTEROL CALCULATED: 128 MG/DL
LYMPHOCYTES ABSOLUTE: 1.6 K/UL (ref 1–5.1)
LYMPHOCYTES RELATIVE PERCENT: 22.3 %
MAGNESIUM: 2.2 MG/DL (ref 1.8–2.4)
MCH RBC QN AUTO: 30.1 PG (ref 26–34)
MCHC RBC AUTO-ENTMCNC: 33.1 G/DL (ref 31–36)
MCV RBC AUTO: 90.7 FL (ref 80–100)
MONOCYTES ABSOLUTE: 0.8 K/UL (ref 0–1.3)
MONOCYTES RELATIVE PERCENT: 10.7 %
NEUTROPHILS ABSOLUTE: 4.5 K/UL (ref 1.7–7.7)
NEUTROPHILS RELATIVE PERCENT: 63.3 %
PDW BLD-RTO: 13.9 % (ref 12.4–15.4)
PLATELET # BLD: 252 K/UL (ref 135–450)
PMV BLD AUTO: 7.9 FL (ref 5–10.5)
POTASSIUM SERPL-SCNC: 5 MMOL/L (ref 3.5–5.1)
RBC # BLD: 5.2 M/UL (ref 4–5.2)
SODIUM BLD-SCNC: 140 MMOL/L (ref 136–145)
TOTAL PROTEIN: 6.7 G/DL (ref 6.4–8.2)
TRIGLYCERIDE, FASTING: 197 MG/DL (ref 0–150)
TSH REFLEX: 0.74 UIU/ML (ref 0.27–4.2)
VITAMIN B-12: 465 PG/ML (ref 211–911)
VLDLC SERPL CALC-MCNC: 39 MG/DL
WBC # BLD: 7.1 K/UL (ref 4–11)

## 2022-11-04 ENCOUNTER — CARE COORDINATION (OUTPATIENT)
Dept: CARE COORDINATION | Age: 77
End: 2022-11-04

## 2022-11-04 NOTE — CARE COORDINATION
PCP referral to care coordination. RN-CC attempted to outreach patient. No answer; HIPPA compliant message left through  requesting call return. RN-CC will follow up at later date & time.

## 2022-11-05 NOTE — RESULT ENCOUNTER NOTE
Cholesterol is elevated. Simvastatin is not strong enough. Are you willing to switch to medication that works better? Sugar is OK and at goal.   Blood counts are normal.  Magnesium and general chemistry results are normal and stable.

## 2022-11-07 DIAGNOSIS — R73.02 IMPAIRED GLUCOSE TOLERANCE: ICD-10-CM

## 2022-11-07 NOTE — LETTER
[place]  [address], [address]        [place]   [address]  [address] [facility] [place] [address]           11/09/22     Dear Ros Milton,    We have on file that you are currently taking metformin ER 500mg daily, losartan 25mg daily, and atorvastatin 40mg daily (simvastatin discontinued). If you are no longer taking or taking differently, please call us at the number below so that we can discuss this and update your medication profile. These medications are filled and ready for you at [facility]. Please pick this medication up as soon as possible, if you have not already done so. It appears that these medications have not been filled at proper times. We are worried you might be missing doses or not taking as directed. It is important that you take your medications regularly and try not to miss a single dose.     Some ways to help you remember to take and refill your medications are to:  · Use a pill box, set an alarm, and/or keep your medication near something that you do every day  · Fill a 3-month supply of your prescription at a time to save you time and trips to the pharmacy - if you would like assistance in switching your prescriptions to a 3-month supply, please contact us  · Ask your pharmacy if they participate in [facility]", a program where you can  all of your medications on the same day  · Ask your pharmacy if you can be set up with automatic refill, where they will automatically refill your prescription when it is due and let you know it's ready to     Sincerely,   Joseph Fraire PharmD, BCACP, [address] [facility], toll free: [phone], option 1

## 2022-11-07 NOTE — TELEPHONE ENCOUNTER
[facility] [place] CLINICAL [facility]ARMACY: ADHERENCE REVIEW  Identified care gap per [employer]: [facility]: ACE/ARB, Diabetes, and Statin adherence    Last Visit: 11.02.22          ASSESSMENT  ACE/ARB ADHERENCE    Insurance Records claims through 10.21.22 (Prior Year [place] = PASSED; NICK Ruiz =  82%; Potential Fail Date: 11.08.22 ):   Losartan last filled on 05.23.22 for 90 day supply. Next refill due: 08.21.22    Per  [employer] Portal:  (same as above). BP Readings from Last 3 Encounters:   11/02/22 126/74   11/02/22 126/74   05/16/22 112/64     Estimated Creatinine Clearance: 45 mL/min (based on SCr of 1.2 mg/dL). DIABETES ADHERENCE    Insurance Records claims through 10.21.22 (Prior Year PDC = PASSED; YTD Major Ruiz =  100%; Passed in 2022): Metformin ER last filled on 08.05.22 for 90 day supply. Next refill due: 11.03.22    Per  [employer] Portal:  (same as above). Lab Results   Component Value Date    LABA1C 5.6 11/02/2022    LABA1C 5.9 03/09/2022    LABA1C 5.8 09/08/2021     NOTE A1c <9%    STATIN ADHERENCE    Insurance Records claims through 10.21.22 (Prior Year PDC = PASSED; NICK Ruiz =  84%; Potential Fail Date: 11.13.22 ):   Simvastatin last filled on 05.29.22 for 90 day supply. Next refill due: 08.27.22    Per  [employer] Portal:  (same as above).        Lab Results   Component Value Date    CHOL 171 03/09/2022    TRIG 152 (H) 03/09/2022    HDL 49 11/02/2022    LDLCALC 128 (H) 11/02/2022     ALT   Date Value Ref Range Status   11/02/2022 8 (L) 10 - 40 U/L Final     AST   Date Value Ref Range Status   11/02/2022 9 (L) 15 - 37 U/L Final     The 10-year ASCVD risk score (Yumiko SEWELL, et al., 2019) is: 45.8%    Values used to calculate the score:      Age: 64 years      Sex: Female      Is Non- : No      Diabetic: Yes      Tobacco smoker: No      Systolic Blood Pressure: 690 mmHg      Is BP treated: Yes      HDL Cholesterol: 49 mg/dL      Total Cholesterol: 216 mg/dL     PLAN  The following are interventions that have been identified:   - Patient overdue refilling Losartan, Metformin ER, & Simvastatin and active on home medication list.     Reached patient to review. - patient confirmed she is taking all above listed medications as directed. Patient states she is not sure what if any refills she is needing because she just walked in the door. I ask patient if she would like for me to verify that [facility] her, she replied; \"that would be fine\", and hung up the phone. [facility] confirmed they do have a 90 day refill available to fill of Losartan 25 mg (will process refill today). They do not have any refills available for Metformin. Will hold off on statin refill, due to most recent pcp ovn/lab results noting possible change of statin. Will route to PharmD for Metformin ER refill.         Future Appointments   Date Time Provider Troy Fitzgerald   11/10/2022  2:00 PM SCHEDULE, [facility] [place] [facility]   1/4/2023 10:10 AM Simba Blum MD [address] [place] [address],[address] Floor Clinical Pharmacy  Phone: toll free [phone]

## 2022-11-07 NOTE — TELEPHONE ENCOUNTER
Dr. Lars Nguyen MD,     Pharmacy out of metformin ER refills. Order pended for your convenience. Last visit: 2022, Next visit: 2023    See encounter note(s) below for complete details. Please let me know if you have any questions.       Thank you,  Donna Barry, PharmD, BCACP, [address] [place] [address], toll free: [phone], option 1    =======================================================    For Pharmacy Admin Tracking Only  Recommendation Provided To: Provider: 1 via Note to Provider, Patient/Caregiver: 3 via Telephone, and Pharmacy: 1  Intervention Detail: Adherence Monitorin and Refill(s) Provided  Gap Closed?: No   Intervention Accepted By: Provider: 1, Patient/Caregiver: 3, and Pharmacy: 1  Time Spent (min): 45

## 2022-11-08 ENCOUNTER — CARE COORDINATION (OUTPATIENT)
Dept: CARE COORDINATION | Age: 77
End: 2022-11-08

## 2022-11-08 DIAGNOSIS — E78.2 MIXED HYPERLIPIDEMIA: Primary | ICD-10-CM

## 2022-11-08 DIAGNOSIS — E78.2 MIXED HYPERLIPIDEMIA: ICD-10-CM

## 2022-11-08 RX ORDER — ATORVASTATIN CALCIUM 40 MG/1
40 TABLET, FILM COATED ORAL DAILY
Qty: 30 TABLET | Refills: 5 | Status: SHIPPED | OUTPATIENT
Start: 2022-11-08 | End: 2022-12-01

## 2022-11-08 RX ORDER — ATORVASTATIN CALCIUM 40 MG/1
40 TABLET, FILM COATED ORAL DAILY
Qty: 30 TABLET | Refills: 5
Start: 2022-11-08 | End: 2022-11-08 | Stop reason: SDUPTHER

## 2022-11-08 RX ORDER — METFORMIN HYDROCHLORIDE 500 MG/1
TABLET, EXTENDED RELEASE ORAL
Qty: 90 TABLET | Refills: 3 | Status: SHIPPED | OUTPATIENT
Start: 2022-11-08

## 2022-11-08 SDOH — ECONOMIC STABILITY: INCOME INSECURITY: IN THE LAST 12 MONTHS, WAS THERE A TIME WHEN YOU WERE NOT ABLE TO PAY THE MORTGAGE OR RENT ON TIME?: NO

## 2022-11-08 SDOH — ECONOMIC STABILITY: HOUSING INSECURITY: IN THE LAST 12 MONTHS, HOW MANY PLACES HAVE YOU LIVED?: 1

## 2022-11-08 SDOH — ECONOMIC STABILITY: HOUSING INSECURITY
IN THE LAST 12 MONTHS, WAS THERE A TIME WHEN YOU DID NOT HAVE A STEADY PLACE TO SLEEP OR SLEPT IN A SHELTER (INCLUDING NOW)?: NO

## 2022-11-08 ASSESSMENT — SOCIAL DETERMINANTS OF HEALTH (SDOH)
HOW OFTEN DO YOU GET TOGETHER WITH FRIENDS OR RELATIVES?: NEVER
IN A TYPICAL WEEK, HOW MANY TIMES DO YOU TALK ON THE PHONE WITH FAMILY, FRIENDS, OR NEIGHBORS?: ONCE A WEEK
HOW OFTEN DO YOU GET TOGETHER WITH FRIENDS OR RELATIVES?: ONCE A WEEK
DO YOU BELONG TO ANY CLUBS OR ORGANIZATIONS SUCH AS CHURCH GROUPS UNIONS, FRATERNAL OR ATHLETIC GROUPS, OR SCHOOL GROUPS?: NO
HOW OFTEN DO YOU ATTENT MEETINGS OF THE CLUB OR ORGANIZATION YOU BELONG TO?: NEVER
DO YOU BELONG TO ANY CLUBS OR ORGANIZATIONS SUCH AS CHURCH GROUPS UNIONS, FRATERNAL OR ATHLETIC GROUPS, OR SCHOOL GROUPS?: NO
HOW OFTEN DO YOU ATTEND CHURCH OR RELIGIOUS SERVICES?: NEVER
HOW OFTEN DO YOU ATTENT MEETINGS OF THE CLUB OR ORGANIZATION YOU BELONG TO?: NEVER
HOW OFTEN DO YOU ATTEND CHURCH OR RELIGIOUS SERVICES?: NEVER

## 2022-11-08 NOTE — CARE COORDINATION
Ambulatory Care Coordination Note  11/8/2022    ACC: Johana Eubanks, RN        Pcp referral to care coordination for medication management, stress and depression. PHQ9 and SLUMS reassessment requested. RN-CC outreached patient; introduced role of care coordinator. Patient agreeable to enrollment. Patient is a 64 yr old female with pmhx of t2dm, htn, cardiomyopathy, PAD, CKD stage III, anxiety, depression, memory impairment. She lives in a single family home with her spouse. She is independent with ADL's. She admits to being forgetful at times and associates this with stress brought on by her spouse. She feels she is happiest when she is sleeping or alone. She state her  feels he has lost control of his life, causing him to be stubborn and \"fussy\" at times. Her  is becoming more forgetful which increases her stress. She is not active with community resources and is not interested in outside help. Patient encouraged to reconsider. Depression and anxiety - she feels the stress associated with caring for her spouse has caused her to become forgetful at times. She fills a weekly mediset but admits she has trouble remembering if she has takes her pills. RN-CC discussed with patient that if the pills are gone from the slot associated with the corresponding day, she likely took her pills. She states if she can't remember if she took her medicine, she won't take them for fear of taking twice. RN-CC recommended setting an alarm and creating a medication chart. After taking her medications, I recommended she notate that on her chart as a reminder - pt is agreeable to this plan. If medication chart is not helpful, RN-CC recommended purchasing a visual pill chart reminder on [address]. Discussed repeating the PHQ9 and Slums - pt is agreeable to repeat in approximately 1 month. She completed the SLUMS on 11/2 - she attributes the low score to stress and depression.  RN-CC discussed psychology referral - pt is not interested at this time. She states her  has multiple medial appointments at this time and does not want to take on more appointments. Her  is being treated for skin cancer -she would like to get through these appointments before scheduling more. Bradycardia - pacemaker check recommended. Wellbutrin stopped d/t possible interference with Metoprolol. Patient prefers to to see if her heart rate improves before repeating SLUMS. Reviewed lab results per patient request. She is surprised to hear her cholesterol is elevated. She is agreeable to starting a new cholesterol medication. She cannot recall if she has missed doses of Atorvastatin. She reports she follows a low cholesterol diet at home - does not eat out. RN-CC offered to send information on low cholesterol diet through the mail but patient declined, she states she already has resources. Diabetes - A1C 5.3. She does not monitor her glucose at home. She tries to avoid concentrated sweets and limits her carb intake. She denies questions or concerns. PLAN:    F/u on community resources - agreeable to referral?  F/u on medication management - did she start a medication chart? F/u on heart rate, stress and anxiety  F/u on SLUMS/PHQ9    Offered patient enrollment in the Remote Patient Monitoring (RPM) program for in-home monitoring: NA. Ambulatory Care Coordination Assessment    Care Coordination Protocol  Referral from Primary Care Provider: No  Week 1 - Initial Assessment     Do you have all of your prescriptions and are they filled?: Yes (Comment: 11/8/22 [facility])  Barriers to medication adherence: Forgets to take, Other  Other barriers to medication adherence: forgetful  Are you able to afford your medications?: Yes  How often do you have trouble taking your medications the way you have been told to take them?: Sometimes I take them as prescribed.      Do you have Home O2 Therapy?: No      Ability to seek help/take action for Emergent Urgent situations i.e. fire, crime, inclement weather or health crisis. : Independent  Ability to ambulate to restroom: Independent  Ability handle personal hygeine needs (bathing/dressing/grooming): Independent  Ability to manage Medications: Independent  Ability to prepare Food Preparation: Independent  Ability to maintain home (clean home, laundry): Independent  Ability to drive and/or has transportation: Independent  Ability to do shopping: Independent  Ability to manage finances: Independent  Is patient able to live independently?: Yes     Current Housing: Private Residence  For whom are you the caregiver?: spouse  Does the person that you care for see a [facility] PCP?: Yes        Per the Fall Risk Screening, did the patient have 2 or more falls or 1 fall with injury in the past year?: No     Frequent urination at night?: No  Do you use rails/bars?: Yes  Do you have a non-slip tub mat?: Yes        Thinking about your patient's physical health needs, are there any symptoms or problems (risk indicators) you are unsure about that require further investigation?: Mild vague physical symptoms or problems; but do not impact on daily life or are not of concern to patient   Are the patients physical health problems impacting on their mental well-being?: No identified areas of concern   Are there any problems with your patients lifestyle behaviors (alcohol, drugs, diet, exercise) that are impacting on physical or mental well-being?: No identified areas of concern   Do you have any other concerns about your patients mental well-being? How would you rate their severity and impact on the patient?: Mild problems - don't interfere with function   How would you rate their home environment in terms of safety and stability (including domestic violence, insecure housing, neighbor harassment)?: Consistently safe, supportive, stable, no identified problems   How do daily activities impact on the patient's well-being? (include current or anticipated unemployment, work, caregiving, access to transportation or other): No identified problems or perceived positive benefits   How would you rate their social network (family, work, friends)?: Adequate participation with social networks   How would you rate their financial resources (including ability to afford all required medical care)?: Financially secure, resources adequate, no identified problems   How wells does the patient now understand their health and well-being (symptoms, signs or risk factors) and what they need to do to manage their health?: Reasonable to good understanding and already engages in managing health or is willing to undertake better management   How well do you think your patient can engage in healthcare discussions? (Barriers include language, deafness, aphasia, alcohol or drug problems, learning difficulties, concentration): Clear and open communication, no identified barriers   Do other services need to be involved to help this patient?: Other care/services not required at this time   Are current services involved with this patient well-coordinated? (Include coordination with other services you are now recommendation): All required care/services in place and well-coordinated   Suggested Interventions and Community Resources                  Prior to Admission medications    Medication Sig Start Date End Date Taking?  Authorizing Provider   simvastatin (ZOCOR) 20 MG tablet TAKE 1 TABLET BY MOUTH EVERY DAY AT NIGHT 8/29/22  Yes Jade Morataya MD   metFORMIN (GLUCOPHAGE-XR) 500 MG extended release tablet TAKE 1 TABLET BY MOUTH EVERY DAY 7/22/22  Yes Jade Morataya MD   escitalopram (LEXAPRO) 10 MG tablet TAKE 1 TABLET BY MOUTH EVERY DAY 4/25/22  Yes Jade Morataya MD   losartan (COZAAR) 25 MG tablet TAKE 1 TABLET BY MOUTH EVERY DAY 4/25/22  Yes Jade Morataya MD   ELIQUIS 5 MG TABS tablet TAKE 1 TABLET BY MOUTH TWICE A DAY 3/23/22  Yes Dana HOLLINS Syed, APRN - CNP   ergocalciferol (DRISDOL) 1.25 MG (42535 UT) capsule Take 1 capsule by mouth once a week 3/9/22  Yes Aspen Newman MD   metoprolol succinate (TOPROL XL) 25 MG extended release tablet TAKE 1/2 TABLET BY MOUTH EVERY DAY 2/7/22  Yes Emy Lawler MD   omeprazole (PRILOSEC) 20 MG capsule Take 20 mg by mouth daily as needed    Yes Historical Provider, MD   gabapentin (NEURONTIN) 300 MG capsule TAKE 1 CAPSULE BY MOUTH THREE TIMES A DAY 6/20/22 9/20/22  Aspen Newman MD   diphenhydrAMINE (BENADRYL) 25 MG capsule Take 25 mg by mouth nightly as needed for Itching     Historical Provider, MD   cetirizine (ZYRTEC) 10 MG tablet Take 10 mg by mouth daily as needed     Historical Provider, MD       Future Appointments   Date Time Provider Troy Lia   11/10/2022  2:00 PM SCHEDULE, [facility] REMOTE TRANSMISSION [facility]   1/4/2023 10:10 AM Aspen Newman MD [facility] - [place]     ,   Diabetes Assessment    Medic Alert ID: No (Comment: 11/8/22 [facility])  Meal Planning: Carb counting, Avoidance of concentrated sweets   How often do you test your blood sugar?: No Testing   Do you have barriers with adherence to non-pharmacologic self-management interventions?  (Nutrition/Exercise/Self-Monitoring): No   Have you ever had to go to the ED for symptoms of low blood sugar?: No       No patient-reported symptoms        , and   General Assessment    Do you have any symptoms that are causing concern?: No

## 2022-11-09 ENCOUNTER — TELEPHONE (OUTPATIENT)
Dept: INTERNAL MEDICINE CLINIC | Age: 77
End: 2022-11-09

## 2022-11-09 NOTE — TELEPHONE ENCOUNTER
----- Message from Mason Gottlieb sent at 11/7/2022  5:32 PM EST -----    ----- Message -----  From: Marlin Mccormick MA  Sent: 11/7/2022   3:02 PM EST  To: Marlin Mccormick MA    [facility]

## 2022-11-10 ENCOUNTER — NURSE ONLY (OUTPATIENT)
Dept: CARDIOLOGY CLINIC | Age: 77
End: 2022-11-10
Payer: MEDICARE

## 2022-11-10 ENCOUNTER — TELEPHONE (OUTPATIENT)
Dept: CARDIOLOGY CLINIC | Age: 77
End: 2022-11-10

## 2022-11-10 DIAGNOSIS — Z95.0 PACEMAKER: Primary | ICD-10-CM

## 2022-11-10 DIAGNOSIS — I49.5 SSS (SICK SINUS SYNDROME) (HCC): ICD-10-CM

## 2022-11-10 PROCEDURE — 93294 REM INTERROG EVL PM/LDLS PM: CPT | Performed by: INTERNAL MEDICINE

## 2022-11-10 PROCEDURE — 93296 REM INTERROG EVL PM/IDS: CPT | Performed by: INTERNAL MEDICINE

## 2022-11-10 NOTE — PROGRESS NOTES
Remote transmission received from patient's dual chamber pacemaker monitor at home. Transmission shows normal sensing and pacing function. Noted NSVT (Toprol XL). Ap 30.2%   67.1% (MVP Off)  PVCs 27.2/hr  Echo 09.2020 showed EF of 50-55%. End of 91-day monitoring period 11/10/22. EP physician will review. See interrogation under cardiology tab in the [address] [place] [address] [address] field for more details. Will continue to monitor remotely.

## 2022-11-10 NOTE — TELEPHONE ENCOUNTER
Pt was already scheduled for a remote check today 11.10.22; transmission received successfully. Please see interrogation under cardiology tab in the [address] [place] [address] [address] field for more details. Next automatic remote is scheduled for 02.16.23.

## 2022-11-10 NOTE — TELEPHONE ENCOUNTER
Patient was seen by her primary care physician and was bradycardic. Patient has not had a recent device interrogation since 7/2022. Please arrange for remote device interrogation, appears patient does not have any further interrogations scheduled.

## 2022-11-16 ENCOUNTER — CARE COORDINATION (OUTPATIENT)
Dept: CARE COORDINATION | Age: 77
End: 2022-11-16

## 2022-11-16 NOTE — CARE COORDINATION
RN-CC attempted to outreach patient for cc follow up; community resources, medication management, stress, anxiety, heart rate, SLUMS, PHQ9. No answer; HIPPA compliant message left through  requesting call return. RN-CC will follow up at later date & time.

## 2022-11-21 ENCOUNTER — CARE COORDINATION (OUTPATIENT)
Dept: CARE COORDINATION | Age: 77
End: 2022-11-21

## 2022-11-21 ENCOUNTER — CLINICAL DOCUMENTATION (OUTPATIENT)
Dept: CARE COORDINATION | Age: 77
End: 2022-11-21

## 2022-11-21 NOTE — CARE COORDINATION
Ambulatory Care Coordination Note  11/21/2022    ACC: Abiel Abel, RN        RN-CC outreached patient for CC follow up; medication management, community resources, stress anxiety, heart rate, SLUMS, PHQ9. Patient reports she is doing well. She feels better since stopping Wellbutrin; she states she feels less tired and her mind feels clearer. She fills a weekly mediset, denies missed doses. Pacemaker interrogated on 11/11/22 - benign nsvt noted. She has not been monitoring her heart rate at home, pt encouraged to do so. Reviewed s/sx of bradycardia. Community resources - she is the primary caregiver to her spouse, which causes her to feel anxious at time. She states she is doing fine and declines information on community resources. Anxiety and depression - she is not interested in a referral to psychology. Pt encouraged to reconsider. She is not interested in repeating SLUMS or PHQ9 at this time. She is agreeable to reconsider mid-December. Patient rushed RN-CC off the phone. She denies questions or concern. She is agreeable to follow up to complete SLUMS in 1 month. PLAN:    SLUMS  PHQ9  Heart rate  Needs & concerns      Offered patient enrollment in the Remote Patient Monitoring (RPM) program for in-home monitoring: NA. Lab Results       None            Care Coordination Interventions    Referral from Primary Care Provider: No  Suggested Interventions and Community Resources  Medi Set or Pill Pack: Completed (Comment: pt fills weekly mediset 11/21/22 [facility])  Pharmacist: Jorge Luis Coles (Comment: 11/21/22 AM)          Goals Addressed                   This Visit's Progress     Medication Management        I will take my medication as directed. I will notify my provider of any problems with medications, like adverse effects or side effects. I will notify my provider/Care Coordinator if I am unable to afford my medications.   I will notify my provider for advice before I stop taking any of my medication. I will setup a a pill chart  reminder to prevent missed doses or accidentally taking more than prescribed. Barriers: lack of support and stress  Plan for overcoming my barriers: Setup pill chart reminder. Fill weekly pill box. Consider referral to psychology to help manage stress and depression. Confidence: 7/10  Anticipated Goal Completion Date: 2/8/22 11/21/22 - Patient fills weekly mediset. Patient reports fatigue and forgetfulness improved since stopping Wellbutrin. She has not monitored her blood pressure who heart rate since stopping Wellbutrin. Pacemaker interrogated on 11/11/22 - benign nsvt noted. Patient is reluctant to repeat SLUMS and PHQ9; she did agree to complete it next month. Prior to Admission medications    Medication Sig Start Date End Date Taking?  Authorizing Provider   metFORMIN (GLUCOPHAGE-XR) 500 MG extended release tablet TAKE 1 TABLET BY MOUTH EVERY DAY 11/8/22   Javed Malhotra MD   atorvastatin (LIPITOR) 40 MG tablet Take 1 tablet by mouth daily 11/8/22   Javed Malhotra MD   gabapentin (NEURONTIN) 300 MG capsule TAKE 1 CAPSULE BY MOUTH THREE TIMES A DAY 6/20/22 9/20/22  Javed Malhotra MD   escitalopram (LEXAPRO) 10 MG tablet TAKE 1 TABLET BY MOUTH EVERY DAY 4/25/22   Javed Malhotra MD   losartan (COZAAR) 25 MG tablet TAKE 1 TABLET BY MOUTH EVERY DAY 4/25/22   Javed Malhotra MD   ELIQUIS 5 MG TABS tablet TAKE 1 TABLET BY MOUTH TWICE A DAY 3/23/22   Dana Lynch APRN - CNP   ergocalciferol (DRISDOL) 1.25 MG (11209 UT) capsule Take 1 capsule by mouth once a week 3/9/22   Javed Malhotra MD   metoprolol succinate (TOPROL XL) 25 MG extended release tablet TAKE 1/2 TABLET BY MOUTH EVERY DAY 2/7/22   Conner Regan MD   diphenhydrAMINE (BENADRYL) 25 MG capsule Take 25 mg by mouth nightly as needed for Itching     Historical Provider, MD   omeprazole (PRILOSEC) 20 MG capsule Take 20 mg by mouth daily as needed     Historical Provider, MD   cetirizine (ZYRTEC) 10 MG tablet Take 10 mg by mouth daily as needed     Historical Provider, MD       Future Appointments   Date Time Provider Troy Fitzgerald   1/4/2023 10:10 AM Joby Guzman MD [facility]   2/16/2023 10:30 AM SCHEDULE, [facility] REMOTE TRANSMISSION [facility]   5/25/2023  8:30 AM SCHEDULE, [facility] TRANSMISSION [facility]   8/31/2023 12:00 PM SCHEDULE, [facility] TRANSMISSION [facility]   12/7/2023  1:00 PM SCHEDULE, [facility] TRANSMISSION [facility]   ,   Diabetes Assessment    Medic Alert ID: No (Comment: 11/21/22 [facility])  Meal Planning: Carb counting, Avoidance of concentrated sweets   How often do you test your blood sugar?: No Testing   Do you have barriers with adherence to non-pharmacologic self-management interventions?  (Nutrition/Exercise/Self-Monitoring): No   Have you ever had to go to the ED for symptoms of low blood sugar?: No       No patient-reported symptoms        , and   General Assessment    Do you have any symptoms that are causing concern?: No

## 2022-12-01 DIAGNOSIS — E78.2 MIXED HYPERLIPIDEMIA: ICD-10-CM

## 2022-12-01 RX ORDER — ATORVASTATIN CALCIUM 40 MG/1
TABLET, FILM COATED ORAL
Qty: 30 TABLET | Refills: 5 | Status: SHIPPED | OUTPATIENT
Start: 2022-12-01

## 2022-12-01 NOTE — TELEPHONE ENCOUNTER
Future Appointments    Encounter Information    Provider Department Appt Notes   1/4/2023 Pau Goldman, [address] [facility] Internal Medicine 9 weeks     Past Visits    Date Provider Specialty Visit Type Primary Dx   11/02/2022 Pau Goldman MD Internal Medicine Office Visit Bradycardia   11/02/2022 Pau Goldman MD Internal Medicine Office Visit Medicare annual wellness visit, subsequent

## 2022-12-13 DIAGNOSIS — M79.10 MYALGIA: ICD-10-CM

## 2022-12-13 RX ORDER — GABAPENTIN 300 MG/1
CAPSULE ORAL
Qty: 270 CAPSULE | Refills: 0 | Status: SHIPPED | OUTPATIENT
Start: 2022-12-13 | End: 2023-03-13

## 2023-01-04 ENCOUNTER — OFFICE VISIT (OUTPATIENT)
Dept: INTERNAL MEDICINE CLINIC | Age: 78
End: 2023-01-04

## 2023-01-04 VITALS
HEART RATE: 48 BPM | BODY MASS INDEX: 34.23 KG/M2 | HEIGHT: 66 IN | DIASTOLIC BLOOD PRESSURE: 62 MMHG | SYSTOLIC BLOOD PRESSURE: 120 MMHG | WEIGHT: 213 LBS

## 2023-01-04 DIAGNOSIS — N18.30 STAGE 3 CHRONIC KIDNEY DISEASE, UNSPECIFIED WHETHER STAGE 3A OR 3B CKD (HCC): ICD-10-CM

## 2023-01-04 DIAGNOSIS — E78.2 MIXED HYPERLIPIDEMIA: Primary | ICD-10-CM

## 2023-01-04 DIAGNOSIS — I73.9 PAD (PERIPHERAL ARTERY DISEASE) (HCC): ICD-10-CM

## 2023-01-04 DIAGNOSIS — I48.0 PAF (PAROXYSMAL ATRIAL FIBRILLATION) (HCC): ICD-10-CM

## 2023-01-04 DIAGNOSIS — E11.9 TYPE 2 DIABETES MELLITUS WITHOUT COMPLICATION, WITHOUT LONG-TERM CURRENT USE OF INSULIN (HCC): ICD-10-CM

## 2023-01-04 DIAGNOSIS — I50.22 CHRONIC SYSTOLIC HEART FAILURE (HCC): ICD-10-CM

## 2023-01-04 PROBLEM — F33.0 MILD RECURRENT MAJOR DEPRESSION (HCC): Status: RESOLVED | Noted: 2022-11-02 | Resolved: 2023-01-04

## 2023-01-04 LAB
A/G RATIO: 1.8 (ref 1.1–2.2)
ALBUMIN SERPL-MCNC: 4.2 G/DL (ref 3.4–5)
ALP BLD-CCNC: 67 U/L (ref 40–129)
ALT SERPL-CCNC: 9 U/L (ref 10–40)
ANION GAP SERPL CALCULATED.3IONS-SCNC: 9 MMOL/L (ref 3–16)
AST SERPL-CCNC: 11 U/L (ref 15–37)
BILIRUB SERPL-MCNC: 0.4 MG/DL (ref 0–1)
BUN BLDV-MCNC: 16 MG/DL (ref 7–20)
CALCIUM SERPL-MCNC: 9.1 MG/DL (ref 8.3–10.6)
CHLORIDE BLD-SCNC: 102 MMOL/L (ref 99–110)
CO2: 29 MMOL/L (ref 21–32)
CREAT SERPL-MCNC: 1.1 MG/DL (ref 0.6–1.2)
GFR SERPL CREATININE-BSD FRML MDRD: 51 ML/MIN/{1.73_M2}
GLUCOSE BLD-MCNC: 112 MG/DL (ref 70–99)
LDL CHOLESTEROL DIRECT: 72 MG/DL
POTASSIUM SERPL-SCNC: 4.8 MMOL/L (ref 3.5–5.1)
SODIUM BLD-SCNC: 140 MMOL/L (ref 136–145)
TOTAL PROTEIN: 6.5 G/DL (ref 6.4–8.2)

## 2023-01-04 ASSESSMENT — PATIENT HEALTH QUESTIONNAIRE - PHQ9
SUM OF ALL RESPONSES TO PHQ QUESTIONS 1-9: 4
8. MOVING OR SPEAKING SO SLOWLY THAT OTHER PEOPLE COULD HAVE NOTICED. OR THE OPPOSITE, BEING SO FIGETY OR RESTLESS THAT YOU HAVE BEEN MOVING AROUND A LOT MORE THAN USUAL: 0
7. TROUBLE CONCENTRATING ON THINGS, SUCH AS READING THE NEWSPAPER OR WATCHING TELEVISION: 1
SUM OF ALL RESPONSES TO PHQ QUESTIONS 1-9: 4
3. TROUBLE FALLING OR STAYING ASLEEP: 1
4. FEELING TIRED OR HAVING LITTLE ENERGY: 1
9. THOUGHTS THAT YOU WOULD BE BETTER OFF DEAD, OR OF HURTING YOURSELF: 0
1. LITTLE INTEREST OR PLEASURE IN DOING THINGS: 0
2. FEELING DOWN, DEPRESSED OR HOPELESS: 1
SUM OF ALL RESPONSES TO PHQ QUESTIONS 1-9: 4
5. POOR APPETITE OR OVEREATING: 0
10. IF YOU CHECKED OFF ANY PROBLEMS, HOW DIFFICULT HAVE THESE PROBLEMS MADE IT FOR YOU TO DO YOUR WORK, TAKE CARE OF THINGS AT HOME, OR GET ALONG WITH OTHER PEOPLE: 1
6. FEELING BAD ABOUT YOURSELF - OR THAT YOU ARE A FAILURE OR HAVE LET YOURSELF OR YOUR FAMILY DOWN: 0
SUM OF ALL RESPONSES TO PHQ QUESTIONS 1-9: 4
SUM OF ALL RESPONSES TO PHQ9 QUESTIONS 1 & 2: 1

## 2023-01-04 NOTE — PROGRESS NOTES
2023    Amanda Aquino (:  1945) is a 76 y.o. female, here for evaluation of the following chief complaint(s):  Check-Up (9 weeks)      ASSESSMENT/PLAN:    1. Mixed hyperlipidemia  Now on more potent statin. She did not fast today so will check direct LDL. Atorvastatin is tolerated. Continue treatment.   - LDL Cholesterol, Direct  - Comprehensive Metabolic Panel    2. Type 2 diabetes mellitus without complication, without long-term current use of insulin ([facility])  Hemoglobin A1C   Date Value Ref Range Status   2022 5.6 See comment % Final          At goal and meeting medical guidelines. Continue treatment. 3. Chronic systolic heart failure (HCC)  Stable with ACE, beta blocker treatment and pacemaker to keep rate up  I reviewed prior echo's and see in  one with significant reduction in EF. 4. PAD (peripheral artery disease) ([facility] [address]  She is not very active but denies pain in legs/feet with walking or sleeping. Mild plaque noted on duplex of LEs bilateral      5. PAF (paroxysmal atrial fibrillation) ([facility])  On chronic anticoagulation. Follows with EP cardiology   Reviewed cardiology notes. 6. Stage 3 chronic kidney disease, unspecified whether stage 3a or 3b CKD ([facility])  Est GFR 48-51 lately so probably stage 3a, but one reading down to 44. FOLLOW UP:  Return in about 4 months (around 2023). HPI  Feeling fine. No new issues. Switched statin. Will need lab to check if reaching goal.  Tolerating it so far. Memory issues. She is doing OK and good enough. Her 's doctor has been informed and he is being evaluated and/or treated now for his memory impairment. He is noting more health problems and is now only at home and does not go out. Bradycardia. Taken off Wellbutrin due to beta blocker. She feels better than last visit when she was more sluggish.   She does not notice any depressive symptoms and is feeling better than when she was on at prior visit.     She is doing OK and doing better. Having some low back pains. Wonders if gabapentin is helping that. She thinks it is due to inactivity. She is walking up and down stairs, but not doing much because she has not felt like it. Outpatient Medications Marked as Taking for the 1/4/23 encounter (Office Visit) with Patience Ortiz MD   Medication Sig Dispense Refill    gabapentin (NEURONTIN) 300 MG capsule TAKE 1 CAPSULE BY MOUTH THREE TIMES A  capsule 0    atorvastatin (LIPITOR) 40 MG tablet TAKE 1 TABLET BY MOUTH EVERY DAY 30 tablet 5    metFORMIN (GLUCOPHAGE-XR) 500 MG extended release tablet TAKE 1 TABLET BY MOUTH EVERY DAY 90 tablet 3    escitalopram (LEXAPRO) 10 MG tablet TAKE 1 TABLET BY MOUTH EVERY DAY 90 tablet 1    losartan (COZAAR) 25 MG tablet TAKE 1 TABLET BY MOUTH EVERY DAY 90 tablet 1    ELIQUIS 5 MG TABS tablet TAKE 1 TABLET BY MOUTH TWICE A  tablet 2    ergocalciferol (DRISDOL) 1.25 MG (75025 UT) capsule Take 1 capsule by mouth once a week 12 capsule 3    metoprolol succinate (TOPROL XL) 25 MG extended release tablet TAKE 1/2 TABLET BY MOUTH EVERY DAY 45 tablet 3    omeprazole (PRILOSEC) 20 MG capsule Take 20 mg by mouth daily as needed       cetirizine (ZYRTEC) 10 MG tablet Take 10 mg by mouth daily as needed          Review of Systems    OBJECTIVE:    Vitals:    01/04/23 1019   BP: 120/62   Pulse: (!) 46   Weight: 213 lb (96.6 kg)   Height: 5' 6\" (1.676 m)       Physical Exam  Vitals reviewed. Constitutional:       Appearance: She is well-developed. Cardiovascular:      Rate and Rhythm: Bradycardia present. Rhythm irregularly irregular. Heart sounds: Heart sounds are distant. Pulmonary:      Effort: Pulmonary effort is normal.      Breath sounds: Normal breath sounds. Musculoskeletal:      Right lower leg: No edema. Left lower leg: No edema. Skin:     General: Skin is warm and dry. Coloration: Skin is not pale. Findings: No erythema. Psychiatric:         Attention and Perception: Attention normal.         Behavior: Behavior normal.         Cognition and Memory: Memory is impaired. Judgment: Judgment is not impulsive or inappropriate. Comments: Memory appears to be reasonably functional.  Prior SLUMS score 21/30. Not retested today          An electronic signature was used to authenticate this note.     Gonzalo Schuster MD

## 2023-01-04 NOTE — PATIENT INSTRUCTIONS
Throw away the simvastatin and take only atorvastatin. Get fasting labs done in 3 months either at the visit or before the visit.

## 2023-01-07 PROBLEM — I50.20 SYSTOLIC HEART FAILURE (HCC): Status: ACTIVE | Noted: 2020-02-05

## 2023-01-07 NOTE — RESULT ENCOUNTER NOTE
Blood tests are all stable and reaching goal.  Kidney function is stable at stage 3a = mild aging decline in kidney function -- avoid use of Advil or Aleve or NSAID medications unless used only once in a while and not daily.  (ask patient if would like actual copy of results;  offer to put at  or mail results to them)

## 2023-01-11 ENCOUNTER — CARE COORDINATION (OUTPATIENT)
Dept: CARE COORDINATION | Age: 78
End: 2023-01-11

## 2023-01-11 NOTE — CARE COORDINATION
Ambulatory Care Coordination Note  1/11/2023    ACC: Johana Eubanks, KANE  RN-CC outreached patient for cc follow up; SLUMS, PHQ9,heart rate, needs & concerns. Patient reports she is doing well. F/u with PCP on 1/4/23 - 4 month follow up scheduled on 5/4/23. Patient declined SLUMS and PHQ9 screening. Patient declined information on community resources. Patient reports she feels better since stopping Wellbutrin - feels less tired. Patient isn't interested in discussing diabetes or heart failure with RN-CC. Patient states she is doing well at the moment and hurried off the phone. Patient disengaged, no further outreach indicated at this time. RN-CC advised patient to follow up with PCP if future needs change, pt hannah. Offered patient enrollment in the Remote Patient Monitoring (RPM) program for in-home monitoring: Patient declined. Lab Results       None            Care Coordination Interventions    Referral from Primary Care Provider: No  Suggested Interventions and Community Resources  Medi Set or Pill Pack: Completed (Comment: pt fills weekly mediset 11/21/22 BISHNU)  Pharmacist: Michael Gutierres (Comment: 11/21/22 AM)          Goals Addressed    None         Prior to Admission medications    Medication Sig Start Date End Date Taking?  Authorizing Provider   gabapentin (NEURONTIN) 300 MG capsule TAKE 1 CAPSULE BY MOUTH THREE TIMES A DAY 12/13/22 3/13/23  Erika Ovalle MD   atorvastatin (LIPITOR) 40 MG tablet TAKE 1 TABLET BY MOUTH EVERY DAY 12/1/22   Erika Ovalle MD   metFORMIN (GLUCOPHAGE-XR) 500 MG extended release tablet TAKE 1 TABLET BY MOUTH EVERY DAY 11/8/22   Erika Ovalle MD   escitalopram (LEXAPRO) 10 MG tablet TAKE 1 TABLET BY MOUTH EVERY DAY 4/25/22   Erika Ovalle MD   losartan (COZAAR) 25 MG tablet TAKE 1 TABLET BY MOUTH EVERY DAY 4/25/22   Erika Ovalle MD   ELIQUIS 5 MG TABS tablet TAKE 1 TABLET BY MOUTH TWICE A DAY 3/23/22   Tosha Lagos, APRN - CNP SHIFT SUMMARY;
RIGO HAS BEEN UP AMBULATING WITH HIS WIFE IN THE HALLWAY. HE HAS NEW ORDERS
FOR MIRALAX AND STOOL SOFTENERS DUE TO NO BM SINCE SUNDAY. HE IS URINATING
INDEPENDENTLY AND DENIES ANY DIFFICULTIES. HE DENIES PAIN. HE IS ABLE TO MAKE
HIS NEEDS KNOWN. CALL LIGHT IN REACH. HE DENIES ANY COMPLAINTS OR CONCERNS AT
THIS TIME. ergocalciferol (DRISDOL) 1.25 MG (74678 UT) capsule Take 1 capsule by mouth once a week 3/9/22   Yoan Stokes MD   metoprolol succinate (TOPROL XL) 25 MG extended release tablet TAKE 1/2 TABLET BY MOUTH EVERY DAY 2/7/22   Melyssa Olmos MD   diphenhydrAMINE (BENADRYL) 25 MG capsule Take 25 mg by mouth nightly as needed for Itching   Patient not taking: Reported on 1/4/2023    Historical Provider, MD   omeprazole (PRILOSEC) 20 MG capsule Take 20 mg by mouth daily as needed     Historical Provider, MD   cetirizine (ZYRTEC) 10 MG tablet Take 10 mg by mouth daily as needed     Historical Provider, MD       Future Appointments   Date Time Provider Troy Fitzgerald   2/16/2023 10:30 AM SCHEDULE, [facility] REMOTE TRANSMISSION [facility]   5/4/2023  9:10 AM Yoan Stokes MD [facility] [place] - [place]   5/25/2023  8:30 AM SCHEDULE, [facility] TRANSMISSION [facility]   8/31/2023 12:00 PM SCHEDULE, [facility] TRANSMISSION [facility]   12/7/2023  1:00 PM SCHEDULE, [facility] TRANSMISSION [facility]   ,   Diabetes Assessment    Medic Alert ID: No (Comment: 11/21/22 [facility])  Meal Planning: Carb counting, Avoidance of concentrated sweets   How often do you test your blood sugar?: No Testing   Do you have barriers with adherence to non-pharmacologic self-management interventions?  (Nutrition/Exercise/Self-Monitoring): No   Have you ever had to go to the ED for symptoms of low blood sugar?: No            ,   Congestive Heart Failure Assessment           Symptoms:          , and   General Assessment

## 2023-02-03 RX ORDER — LOSARTAN POTASSIUM 25 MG/1
TABLET ORAL
Qty: 90 TABLET | Refills: 0 | Status: SHIPPED | OUTPATIENT
Start: 2023-02-03

## 2023-02-16 ENCOUNTER — NURSE ONLY (OUTPATIENT)
Dept: CARDIOLOGY CLINIC | Age: 78
End: 2023-02-16

## 2023-02-16 DIAGNOSIS — Z95.0 PACEMAKER: Primary | ICD-10-CM

## 2023-02-16 DIAGNOSIS — I49.5 SSS (SICK SINUS SYNDROME) (HCC): ICD-10-CM

## 2023-02-17 NOTE — PROGRESS NOTES
Remote transmission received from patient's dual chamber pacemaker monitor at home. Transmission shows normal sensing and pacing function. Noted NSVT and <0.1% AT burden (Toprol XL, eliquis). Ap 33.8%   68.4% (MVP Off)  PVCs 38.3 p/hr  Echo 09.2020 showed EF of 50-55%. EP physician will review. See interrogation under cardiology tab in the [address] [place] [address] [address] field for more details. Will continue to monitor remotely. (End of 91-day monitoring period 2/16/23).

## 2023-03-05 DIAGNOSIS — E55.9 VITAMIN D DEFICIENCY: ICD-10-CM

## 2023-03-05 RX ORDER — ERGOCALCIFEROL 1.25 MG/1
CAPSULE ORAL
Qty: 12 CAPSULE | Refills: 1 | Status: SHIPPED | OUTPATIENT
Start: 2023-03-05

## 2023-03-06 RX ORDER — METOPROLOL SUCCINATE 25 MG/1
TABLET, EXTENDED RELEASE ORAL
Qty: 45 TABLET | Refills: 3 | Status: SHIPPED | OUTPATIENT
Start: 2023-03-06

## 2023-03-06 NOTE — TELEPHONE ENCOUNTER
Last ov :02/16/2023  Next ov :05/04/2023  Most recent lab : CMP ,LIPID FASTING 01/04/2023  Last EKG : 05/16/2022

## 2023-03-21 NOTE — TELEPHONE ENCOUNTER
Last OV: 5/16/22  Next OV: 5/15/23  Last refill: 3/23/22  #180  2 R/F  Most recent Labs: 1/4/23  Last EKG (if needed): 5/16/22

## 2023-03-22 RX ORDER — APIXABAN 5 MG/1
TABLET, FILM COATED ORAL
Qty: 180 TABLET | Refills: 0 | Status: SHIPPED | OUTPATIENT
Start: 2023-03-22

## 2023-04-18 DIAGNOSIS — F41.1 GENERALIZED ANXIETY DISORDER: ICD-10-CM

## 2023-04-18 RX ORDER — ESCITALOPRAM OXALATE 10 MG/1
TABLET ORAL
Qty: 90 TABLET | Refills: 1 | Status: SHIPPED | OUTPATIENT
Start: 2023-04-18

## 2023-04-18 NOTE — TELEPHONE ENCOUNTER
Future Appointments    Encounter Information    Provider Department Appt Notes   5/4/2023 Riya Johnson MD [facility] Internal Medicine Return in about 4 months (around 5/4/2023).      Past Visits    Date Provider Specialty Visit Type Primary Dx   01/04/2023 Riya Johnson MD Internal Medicine Office Visit Mixed hyperlipidemia

## 2023-05-15 ENCOUNTER — OFFICE VISIT (OUTPATIENT)
Dept: CARDIOLOGY CLINIC | Age: 78
End: 2023-05-15
Payer: MEDICARE

## 2023-05-15 VITALS
DIASTOLIC BLOOD PRESSURE: 76 MMHG | BODY MASS INDEX: 35.65 KG/M2 | WEIGHT: 214 LBS | SYSTOLIC BLOOD PRESSURE: 128 MMHG | HEIGHT: 65 IN | OXYGEN SATURATION: 98 % | HEART RATE: 80 BPM

## 2023-05-15 DIAGNOSIS — I10 ESSENTIAL HYPERTENSION: ICD-10-CM

## 2023-05-15 DIAGNOSIS — I42.9 CARDIOMYOPATHY, UNSPECIFIED TYPE (HCC): ICD-10-CM

## 2023-05-15 DIAGNOSIS — E78.5 HYPERLIPIDEMIA, UNSPECIFIED HYPERLIPIDEMIA TYPE: ICD-10-CM

## 2023-05-15 DIAGNOSIS — I49.5 SSS (SICK SINUS SYNDROME) (HCC): ICD-10-CM

## 2023-05-15 DIAGNOSIS — G47.33 OSA (OBSTRUCTIVE SLEEP APNEA): ICD-10-CM

## 2023-05-15 DIAGNOSIS — Z95.0 PACEMAKER: ICD-10-CM

## 2023-05-15 DIAGNOSIS — I48.0 PAF (PAROXYSMAL ATRIAL FIBRILLATION) (HCC): Primary | ICD-10-CM

## 2023-05-15 PROCEDURE — G8427 DOCREV CUR MEDS BY ELIG CLIN: HCPCS | Performed by: INTERNAL MEDICINE

## 2023-05-15 PROCEDURE — 1090F PRES/ABSN URINE INCON ASSESS: CPT | Performed by: INTERNAL MEDICINE

## 2023-05-15 PROCEDURE — 99214 OFFICE O/P EST MOD 30 MIN: CPT | Performed by: INTERNAL MEDICINE

## 2023-05-15 PROCEDURE — 93000 ELECTROCARDIOGRAM COMPLETE: CPT | Performed by: INTERNAL MEDICINE

## 2023-05-15 PROCEDURE — G8417 CALC BMI ABV UP PARAM F/U: HCPCS | Performed by: INTERNAL MEDICINE

## 2023-05-15 PROCEDURE — 3078F DIAST BP <80 MM HG: CPT | Performed by: INTERNAL MEDICINE

## 2023-05-15 PROCEDURE — 3074F SYST BP LT 130 MM HG: CPT | Performed by: INTERNAL MEDICINE

## 2023-05-15 PROCEDURE — G8399 PT W/DXA RESULTS DOCUMENT: HCPCS | Performed by: INTERNAL MEDICINE

## 2023-05-15 PROCEDURE — 1123F ACP DISCUSS/DSCN MKR DOCD: CPT | Performed by: INTERNAL MEDICINE

## 2023-05-15 PROCEDURE — 1036F TOBACCO NON-USER: CPT | Performed by: INTERNAL MEDICINE

## 2023-05-15 NOTE — PROGRESS NOTES
Cardiology Follow Up    Referring Physician: Dr. Rosa Elena Johnston     Chief Complaint:   Chief Complaint   Patient presents with    Annual Exam     No cardiac symptoms. Subjective:   History of Present Illness:  Daily Tavarez is a 76 y.o. female who presents with PMH significant for DM, CKD, TAZ on CPAP, HTN, anxiety, AFIB s/p ablation 2020 with post op pericardial effusion with normal LVEF 55-60% 20 with repeat echo revealing reduction in LVEF 25-30%, medical therapy initiated and repeat echo 20 showed LVEF had recovered 50-55%. Today, she states overall she is doing well. She denies any new cardiac complaints and states she continues to remain active without any exertional symptoms. She denies any chest pains or worsening shortness of breath. She reports compliance with her medications and tolerating. She denies any abnormal bleeding or bruising. Patient denies exertional chest pain/pressure, dyspnea at rest, BREWSTER, PND, orthopnea, palpitations, lightheadedness, weight changes, changes in LE edema, and syncope. Prior cardiac testing:    EK/10/21 Electronic dual-chamber pacemaker  -possibly demand type   Pacemaker ECG, No further analysis     Carotid doppler: 2021   The right internal carotid artery appears to have a 0-15% diameter reducing    stenosis based on velocity criteria. The right vertebral artery demonstrates normal antegrade flow. Limited visualization of the Internal Carotid Artery due to depth of vessel. The left internal carotid artery appears to have a 0-15% diameter reducing    stenosis based on velocity criteria. The left vertebral artery demonstrates normal antegrade flow. Limited visualization of the Internal Carotid Artery due to depth of vessel. Arterial doppler: 2021   Right LUISA: 1.11. This is consistent with no significant PAD at rest. PTA    pressure was 155 and DPA pressure was 157.     The

## 2023-05-25 ENCOUNTER — NURSE ONLY (OUTPATIENT)
Dept: CARDIOLOGY CLINIC | Age: 78
End: 2023-05-25

## 2023-05-25 DIAGNOSIS — I49.5 SSS (SICK SINUS SYNDROME) (HCC): ICD-10-CM

## 2023-05-25 DIAGNOSIS — Z95.0 PACEMAKER: Primary | ICD-10-CM

## 2023-06-02 DIAGNOSIS — M79.10 MYALGIA: ICD-10-CM

## 2023-06-02 RX ORDER — GABAPENTIN 300 MG/1
CAPSULE ORAL
Qty: 90 CAPSULE | Refills: 0 | Status: SHIPPED | OUTPATIENT
Start: 2023-06-02 | End: 2023-07-13

## 2023-06-08 NOTE — PLAN OF CARE
From: Chriss Ruth  To: Marie Cuello  Sent: 6/8/2023 9:51 AM CDT  Subject: Medicines for renewal    Please renew the following scripts through [facility]: Atorvastatin 40mg, Ibuprofen 800 mg, Tizanidine Hcl 2 mg. Thank you   Problem: Infection:  Goal: Will remain free from infection  Description  Will remain free from infection  Outcome: Ongoing  Note:   Will remain free from infection. Problem: Daily Care:  Goal: Daily care needs are met  Description  Daily care needs are met  Outcome: Ongoing  Note:   Daily care needs will be met. Problem: Falls - Risk of:  Goal: Will remain free from falls  Description  Will remain free from falls  Outcome: Ongoing  Note:   Will remain free from falls.

## 2023-06-09 NOTE — PROGRESS NOTES
Remote transmission received from patient's dual chamber pacemaker monitor at home. Transmission shows normal sensing and pacing function. No new arrhythmias/ or events. Ap 21.6%   80.4% (MVP Off)  PVCs 21.3 p/hr  Echo 09.2020 showed EF of 50-55%. EP physician will review. See interrogation under cardiology tab in the [address] [place] [address] [address] field for more details. Will continue to monitor remotely.      (End of 91-day monitoring period 5/25/23)

## 2023-06-30 RX ORDER — APIXABAN 5 MG/1
TABLET, FILM COATED ORAL
Qty: 180 TABLET | Refills: 3 | Status: SHIPPED | OUTPATIENT
Start: 2023-06-30

## 2023-07-12 DIAGNOSIS — M79.10 MYALGIA: ICD-10-CM

## 2023-07-13 RX ORDER — GABAPENTIN 300 MG/1
CAPSULE ORAL
Qty: 90 CAPSULE | Refills: 1 | Status: SHIPPED | OUTPATIENT
Start: 2023-07-13 | End: 2023-09-13

## 2023-07-13 NOTE — TELEPHONE ENCOUNTER
Last OV: 1/4/2023  Next OV: Visit date not found    Next appointment due: was due to come in 05/04/23

## 2023-08-18 RX ORDER — LOSARTAN POTASSIUM 25 MG/1
TABLET ORAL
Qty: 90 TABLET | Refills: 0 | Status: SHIPPED | OUTPATIENT
Start: 2023-08-18 | End: 2023-09-06

## 2023-08-21 NOTE — PROGRESS NOTES
Pt seen in clinic today for cardiac device interrogation. Their device is a  MDT 2 chamber ppm    Based on threshold, impedance, and intrinsic sensing tests run today, the device appears to be functioning with minimal deviation from established trends. Remaining battery life is 11y2m  AP 11.62%                  90.15%      no new episodes since last remote check    Rx:  ELIQUIS 5 MG TABS tablet 1 tab po bid  metoprolol succinate (TOPROL XL) 25 MG extended release tablet 1/2 tab po qd    Pt was informed of findings today and general questions have been answered with regard to device. Home monitoring hardware is transmitting on schedule. Results discussed with or to be reviewed by Dr. Mary Ann Matthews    Pt to see Dr. Juan Alberto Chung in clinic today following their device check.
oriented to person, place and time

## 2023-09-02 DIAGNOSIS — E78.2 MIXED HYPERLIPIDEMIA: ICD-10-CM

## 2023-09-04 RX ORDER — ATORVASTATIN CALCIUM 40 MG/1
TABLET, FILM COATED ORAL
Qty: 90 TABLET | Refills: 1 | Status: SHIPPED | OUTPATIENT
Start: 2023-09-04 | End: 2023-09-06

## 2023-09-06 ENCOUNTER — OFFICE VISIT (OUTPATIENT)
Dept: INTERNAL MEDICINE CLINIC | Age: 78
End: 2023-09-06

## 2023-09-06 VITALS
HEIGHT: 65 IN | OXYGEN SATURATION: 96 % | HEART RATE: 70 BPM | DIASTOLIC BLOOD PRESSURE: 82 MMHG | WEIGHT: 208.2 LBS | SYSTOLIC BLOOD PRESSURE: 138 MMHG | BODY MASS INDEX: 34.69 KG/M2

## 2023-09-06 DIAGNOSIS — F33.1 MODERATE EPISODE OF RECURRENT MAJOR DEPRESSIVE DISORDER (HCC): ICD-10-CM

## 2023-09-06 DIAGNOSIS — I50.22 CHRONIC SYSTOLIC HEART FAILURE (HCC): ICD-10-CM

## 2023-09-06 DIAGNOSIS — M79.10 MYALGIA: ICD-10-CM

## 2023-09-06 DIAGNOSIS — E79.0 HYPERURICEMIA: ICD-10-CM

## 2023-09-06 DIAGNOSIS — K21.9 GASTROESOPHAGEAL REFLUX DISEASE, UNSPECIFIED WHETHER ESOPHAGITIS PRESENT: ICD-10-CM

## 2023-09-06 DIAGNOSIS — E11.22 CONTROLLED TYPE 2 DIABETES MELLITUS WITH STAGE 3 CHRONIC KIDNEY DISEASE, WITHOUT LONG-TERM CURRENT USE OF INSULIN (HCC): Primary | ICD-10-CM

## 2023-09-06 DIAGNOSIS — I48.0 PAF (PAROXYSMAL ATRIAL FIBRILLATION) (HCC): ICD-10-CM

## 2023-09-06 DIAGNOSIS — N18.30 CONTROLLED TYPE 2 DIABETES MELLITUS WITH STAGE 3 CHRONIC KIDNEY DISEASE, WITHOUT LONG-TERM CURRENT USE OF INSULIN (HCC): Primary | ICD-10-CM

## 2023-09-06 DIAGNOSIS — I10 PRIMARY HYPERTENSION: ICD-10-CM

## 2023-09-06 DIAGNOSIS — E78.2 MIXED HYPERLIPIDEMIA: ICD-10-CM

## 2023-09-06 DIAGNOSIS — F41.1 GENERALIZED ANXIETY DISORDER: ICD-10-CM

## 2023-09-06 DIAGNOSIS — N18.30 STAGE 3 CHRONIC KIDNEY DISEASE, UNSPECIFIED WHETHER STAGE 3A OR 3B CKD (HCC): ICD-10-CM

## 2023-09-06 DIAGNOSIS — E55.9 VITAMIN D DEFICIENCY: ICD-10-CM

## 2023-09-06 DIAGNOSIS — R41.3 MEMORY IMPAIRMENT: ICD-10-CM

## 2023-09-06 LAB
ALBUMIN SERPL-MCNC: 4.2 G/DL (ref 3.4–5)
ALBUMIN/GLOB SERPL: 1.6 {RATIO} (ref 1.1–2.2)
ALP SERPL-CCNC: 77 U/L (ref 40–129)
ALT SERPL-CCNC: 8 U/L (ref 10–40)
ANION GAP SERPL CALCULATED.3IONS-SCNC: 15 MMOL/L (ref 3–16)
AST SERPL-CCNC: 10 U/L (ref 15–37)
BASOPHILS # BLD: 0.1 K/UL (ref 0–0.2)
BASOPHILS NFR BLD: 1.5 %
BILIRUB SERPL-MCNC: 0.7 MG/DL (ref 0–1)
BUN SERPL-MCNC: 13 MG/DL (ref 7–20)
CALCIUM SERPL-MCNC: 9.3 MG/DL (ref 8.3–10.6)
CHLORIDE SERPL-SCNC: 103 MMOL/L (ref 99–110)
CHOLEST SERPL-MCNC: 164 MG/DL (ref 0–199)
CO2 SERPL-SCNC: 23 MMOL/L (ref 21–32)
CREAT SERPL-MCNC: 1.1 MG/DL (ref 0.6–1.2)
CREAT UR-MCNC: 244.4 MG/DL (ref 28–259)
DEPRECATED RDW RBC AUTO: 13.8 % (ref 12.4–15.4)
EOSINOPHIL # BLD: 0.1 K/UL (ref 0–0.6)
EOSINOPHIL NFR BLD: 2.4 %
GFR SERPLBLD CREATININE-BSD FMLA CKD-EPI: 51 ML/MIN/{1.73_M2}
GLUCOSE SERPL-MCNC: 124 MG/DL (ref 70–99)
HCT VFR BLD AUTO: 44.7 % (ref 36–48)
HDLC SERPL-MCNC: 45 MG/DL (ref 40–60)
HGB BLD-MCNC: 15 G/DL (ref 12–16)
LDL CHOLESTEROL CALCULATED: 87 MG/DL
LYMPHOCYTES # BLD: 1.6 K/UL (ref 1–5.1)
LYMPHOCYTES NFR BLD: 27.7 %
MCH RBC QN AUTO: 29.8 PG (ref 26–34)
MCHC RBC AUTO-ENTMCNC: 33.6 G/DL (ref 31–36)
MCV RBC AUTO: 88.6 FL (ref 80–100)
MICROALBUMIN UR DL<=1MG/L-MCNC: 2.1 MG/DL
MICROALBUMIN/CREAT UR: 8.6 MG/G (ref 0–30)
MONOCYTES # BLD: 0.6 K/UL (ref 0–1.3)
MONOCYTES NFR BLD: 11 %
NEUTROPHILS # BLD: 3.3 K/UL (ref 1.7–7.7)
NEUTROPHILS NFR BLD: 57.4 %
PLATELET # BLD AUTO: 223 K/UL (ref 135–450)
PMV BLD AUTO: 8 FL (ref 5–10.5)
POTASSIUM SERPL-SCNC: 4 MMOL/L (ref 3.5–5.1)
PROT SERPL-MCNC: 6.8 G/DL (ref 6.4–8.2)
RBC # BLD AUTO: 5.05 M/UL (ref 4–5.2)
SODIUM SERPL-SCNC: 141 MMOL/L (ref 136–145)
TRIGL SERPL-MCNC: 162 MG/DL (ref 0–150)
TSH SERPL DL<=0.005 MIU/L-ACNC: 1.07 UIU/ML (ref 0.27–4.2)
URATE SERPL-MCNC: 6.8 MG/DL (ref 2.6–6)
VIT B12 SERPL-MCNC: 582 PG/ML (ref 211–911)
VLDLC SERPL CALC-MCNC: 32 MG/DL
WBC # BLD AUTO: 5.7 K/UL (ref 4–11)

## 2023-09-06 RX ORDER — ESCITALOPRAM OXALATE 10 MG/1
10 TABLET ORAL DAILY
Qty: 90 TABLET | Refills: 1 | Status: SHIPPED | OUTPATIENT
Start: 2023-09-06

## 2023-09-06 RX ORDER — GABAPENTIN 300 MG/1
300 CAPSULE ORAL 3 TIMES DAILY
Qty: 90 CAPSULE | Refills: 5 | Status: SHIPPED | OUTPATIENT
Start: 2023-09-06 | End: 2024-03-04

## 2023-09-06 RX ORDER — OMEPRAZOLE 20 MG/1
20 CAPSULE, DELAYED RELEASE ORAL DAILY PRN
Qty: 30 CAPSULE | COMMUNITY
Start: 2023-09-06

## 2023-09-06 RX ORDER — METFORMIN HYDROCHLORIDE 500 MG/1
TABLET, EXTENDED RELEASE ORAL
Qty: 90 TABLET | Refills: 3 | Status: SHIPPED | OUTPATIENT
Start: 2023-09-06

## 2023-09-06 RX ORDER — LOSARTAN POTASSIUM 25 MG/1
25 TABLET ORAL DAILY
Qty: 90 TABLET | Refills: 1 | Status: SHIPPED | OUTPATIENT
Start: 2023-09-06

## 2023-09-06 RX ORDER — METOPROLOL SUCCINATE 25 MG/1
12.5 TABLET, EXTENDED RELEASE ORAL DAILY
Qty: 45 TABLET | Refills: 3 | Status: SHIPPED | OUTPATIENT
Start: 2023-09-06

## 2023-09-06 RX ORDER — ATORVASTATIN CALCIUM 40 MG/1
40 TABLET, FILM COATED ORAL DAILY
Qty: 90 TABLET | Refills: 1 | Status: SHIPPED | OUTPATIENT
Start: 2023-09-06

## 2023-09-06 RX ORDER — BUPROPION HYDROCHLORIDE 150 MG/1
150 TABLET ORAL EVERY MORNING
Qty: 90 TABLET | Refills: 1 | Status: SHIPPED | OUTPATIENT
Start: 2023-09-06

## 2023-09-06 RX ORDER — ERGOCALCIFEROL 1.25 MG/1
50000 CAPSULE ORAL WEEKLY
Qty: 12 CAPSULE | Refills: 1 | Status: SHIPPED | OUTPATIENT
Start: 2023-09-06

## 2023-09-06 ASSESSMENT — PATIENT HEALTH QUESTIONNAIRE - PHQ9
7. TROUBLE CONCENTRATING ON THINGS, SUCH AS READING THE NEWSPAPER OR WATCHING TELEVISION: 2
SUM OF ALL RESPONSES TO PHQ QUESTIONS 1-9: 14
1. LITTLE INTEREST OR PLEASURE IN DOING THINGS: 3
SUM OF ALL RESPONSES TO PHQ QUESTIONS 1-9: 14
3. TROUBLE FALLING OR STAYING ASLEEP: 2
6. FEELING BAD ABOUT YOURSELF - OR THAT YOU ARE A FAILURE OR HAVE LET YOURSELF OR YOUR FAMILY DOWN: 1
SUM OF ALL RESPONSES TO PHQ QUESTIONS 1-9: 14
SUM OF ALL RESPONSES TO PHQ QUESTIONS 1-9: 14
2. FEELING DOWN, DEPRESSED OR HOPELESS: 2
9. THOUGHTS THAT YOU WOULD BE BETTER OFF DEAD, OR OF HURTING YOURSELF: 0
10. IF YOU CHECKED OFF ANY PROBLEMS, HOW DIFFICULT HAVE THESE PROBLEMS MADE IT FOR YOU TO DO YOUR WORK, TAKE CARE OF THINGS AT HOME, OR GET ALONG WITH OTHER PEOPLE: 1
5. POOR APPETITE OR OVEREATING: 2
SUM OF ALL RESPONSES TO PHQ9 QUESTIONS 1 & 2: 5
8. MOVING OR SPEAKING SO SLOWLY THAT OTHER PEOPLE COULD HAVE NOTICED. OR THE OPPOSITE, BEING SO FIGETY OR RESTLESS THAT YOU HAVE BEEN MOVING AROUND A LOT MORE THAN USUAL: 0
4. FEELING TIRED OR HAVING LITTLE ENERGY: 2

## 2023-09-06 NOTE — PROGRESS NOTES
2023    Clarita Aquino (:  1945) is a 76 y.o. female, here for evaluation of the following chief complaint(s): Anxiety and Memory Loss    She is fasting today. Time of visit 48 minutes. ASSESSMENT/PLAN:    1. Controlled type 2 diabetes mellitus with stage 3 chronic kidney disease, without long-term current use of insulin ([facility])  Lab Results   Component Value Date    LABA1C 5.6 2022     Wt Readings from Last 3 Encounters:   23 208 lb 3.2 oz (94.4 kg)   05/15/23 214 lb (97.1 kg)   23 213 lb (96.6 kg)       - Comprehensive Metabolic Panel  - Hemoglobin A1C  - Microalbumin / Creatinine Urine Ratio  - metFORMIN (GLUCOPHAGE-XR) 500 MG extended release tablet; TAKE 1 TABLET BY MOUTH EVERY DAY FOR DIABETES  Dispense: 90 tablet; Refill: 3    2. Mixed hyperlipidemia  Lab Results   Component Value Date    LDLCALC 128 (H) 2022    LDLDIRECT 72 2023       - Lipid, Fasting. Was at goal in January with higher dose. - atorvastatin (LIPITOR) 40 MG tablet; Take 1 tablet by mouth daily For cholesterol  Dispense: 90 tablet; Refill: 1    3. Stage 3 chronic kidney disease, unspecified whether stage 3a or 3b CKD ([facility])  Last est GFR 51. Due for labs today. 4.  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  - Resume Wellbutrin and continue Lexapro     PHQ Scores 2023 2023 2022 3/30/2021 3/19/2021 2020 4/3/2019   PHQ2 Score 5 1 5 0 1 0 1   PHQ9 Score 14 4 8 0 1 0 1     Interpretation of Total Score Depression Severity: 1-4 = Minimal depression, 5-9 = Mild depression, 10-14 = Moderate depression, 15-19 = Moderately severe depression, 20-27 = Severe depression      5. Memory impairment  Did clock drawing correctly and will FU with a telephone visit for SLUMS. - Vitamin B12  - TSH with Reflex    6. Generalized anxiety disorder  Continue medication. - escitalopram (LEXAPRO) 10 MG tablet;  Take 1 tablet by mouth daily For anxiety and

## 2023-09-07 LAB
EST. AVERAGE GLUCOSE BLD GHB EST-MCNC: 122.6 MG/DL
HBA1C MFR BLD: 5.9 %

## 2023-11-14 ENCOUNTER — TELEPHONE (OUTPATIENT)
Dept: PHARMACY | Facility: CLINIC | Age: 78
End: 2023-11-14

## 2023-11-14 NOTE — TELEPHONE ENCOUNTER
POPULATION HEALTH CLINICAL PHARMACY: ADHERENCE REVIEW  Identified care gap per [employer]: fills at [facility]: Diabetes adherence    Patient also appears to be prescribed: ACE/ARB and Statin (passed  adherence measures)    ASSESSMENT  DIABETES ADHERENCE    Insurance Records claims through 2023 (Prior Year [address] = not reported; ):   Metformin ER 500mg last filled on 7/15/23 for 90 day supply. Next refill due: 10/13/23    Prescribed si tablet/capsule daily    Per [facility]:  Will process refill today    Lab Results   Component Value Date    LABA1C 5.9 2023    LABA1C 5.6 2022    LABA1C 5.9 2022     NOTE: A1c <9%    PLAN  Per insurer report, LIS-0 - co-pays are based on tiers and patient is subject to coverage gap. The following are interventions that have been identified:   Patient overdue refilling Metformin ER 500mg and active on home medication list.     Reached patient to review. Verified medication instructions and Education provided. States she's away from her meds but believes she needs a refill     Last Visit: 23  Next Visit: none      Leila Nguyen [facility].    [facility] free:  [facility] [address] Only    Program: Wei in place:  No  Recommendation Provided To: Patient/Caregiver: 1 via Telephone and Pharmacy: 1  Intervention Detail: Adherence Monitorin and Refill(s) Provided  Intervention Accepted By: Patient/Caregiver: 1 and Pharmacy: 1  Gap Closed?: Yes   Time Spent (min): 15

## 2023-12-09 PROCEDURE — 93296 REM INTERROG EVL PM/IDS: CPT | Performed by: INTERNAL MEDICINE

## 2023-12-09 PROCEDURE — 93294 REM INTERROG EVL PM/LDLS PM: CPT | Performed by: INTERNAL MEDICINE

## 2024-05-14 ENCOUNTER — OFFICE VISIT (OUTPATIENT)
Dept: CARDIOLOGY CLINIC | Age: 79
End: 2024-05-14

## 2024-05-14 VITALS
HEART RATE: 70 BPM | DIASTOLIC BLOOD PRESSURE: 80 MMHG | WEIGHT: 221.6 LBS | OXYGEN SATURATION: 96 % | SYSTOLIC BLOOD PRESSURE: 136 MMHG | HEIGHT: 65 IN | BODY MASS INDEX: 36.92 KG/M2

## 2024-05-14 DIAGNOSIS — I48.0 PAF (PAROXYSMAL ATRIAL FIBRILLATION) (HCC): ICD-10-CM

## 2024-05-14 DIAGNOSIS — G47.33 OSA (OBSTRUCTIVE SLEEP APNEA): ICD-10-CM

## 2024-05-14 DIAGNOSIS — I10 ESSENTIAL HYPERTENSION: ICD-10-CM

## 2024-05-14 DIAGNOSIS — Z95.0 PACEMAKER: Primary | ICD-10-CM

## 2024-05-14 DIAGNOSIS — I49.5 SSS (SICK SINUS SYNDROME) (HCC): ICD-10-CM

## 2024-05-14 RX ORDER — ADHESIVE BANDAGE 3/4"
BANDAGE TOPICAL
COMMUNITY
Start: 2024-05-02

## 2024-05-14 RX ORDER — ALLOPURINOL 100 MG/1
100 TABLET ORAL DAILY
COMMUNITY
Start: 2024-05-03 | End: 2024-08-01

## 2024-05-14 NOTE — PROGRESS NOTES
reducing    stenosis based on velocity criteria.    The left vertebral artery demonstrates normal antegrade flow.    Limited visualization of the Internal Carotid Artery due to depth of vessel.         Arterial doppler: 7/2021   Right LUISA: 1.11. This is consistent with no significant PAD at rest. PTA    pressure was 155 and DPA pressure was 157.    The majority of the waveforms are triphasic throughout the right lower    extremity.    No significant stenosis noted in the lower right extremity.    Mild plaque formation demonstrated in the distal Superficial Femoral Artery    and distal Popliteal Artery.    Peroneal Artery was not well visualized.    Left LUISA: 1.18. This is consistent with no significant PAD at rest. PTA    pressure was 161 and DPA was 167.    The majority of the waveforms are triphasic throughout the left lower    extremity.    No significant stenosis noted in the left lower extremity.    Mild plaque formation demonstrated in the distal Superficial Femoral Artery    and Popliteal Artery.     Echo: 9/16/20  Normal LV size with assymetric septal contraction due to RV pacing. EF 50-55%  Mild mitral regurgitation is present.  The left atrium is normal in size.  Normal right ventricular size and function. Pacer / ICD wire is visualized  in the right ventricle.  Trivial tricuspid regurgitation.  No pericardial effusion noted.    Limited echo:2/5/20  Normal left ventricular size and wall thickness.  Globally reduced left ventricular systolic function with an estimated  ejection fraction of 25-30%.  Abnormal septal motion with hypokinesia  Indeterminate diastolic function.  The left atrium appears dilated.  Anterior pericardial fluid small  Pacer / ICD wire is visualized in the right ventricle.  Right ventricular systolic function appears reduced  Mild to moderate mitral regurgitation.  Very mild systolic flow reversal seen in the pulmonary views.  Mild pulmonic and tricuspid regurgitation.  Estimated

## 2024-07-08 DIAGNOSIS — I10 PRIMARY HYPERTENSION: ICD-10-CM

## 2024-07-08 DIAGNOSIS — I50.22 CHRONIC SYSTOLIC HEART FAILURE (HCC): ICD-10-CM

## 2024-07-08 RX ORDER — LOSARTAN POTASSIUM 25 MG/1
25 TABLET ORAL DAILY
Qty: 90 TABLET | Refills: 1 | Status: SHIPPED | OUTPATIENT
Start: 2024-07-08

## 2024-08-26 ENCOUNTER — TELEPHONE (OUTPATIENT)
Dept: PHARMACY | Facility: CLINIC | Age: 79
End: 2024-08-26

## 2024-08-26 NOTE — TELEPHONE ENCOUNTER
[facility] CLINICAL PHARMACY: ADHERENCE REVIEW  Identified care gap per [employer]: fills at [facility]: Statin adherence    Patient also appears to be prescribed: ACE/ARB, Diabetes, and Statin    ASSESSMENT  STATIN ADHERENCE    Insurance Records claims through 2024 (Prior Year PDC = not reported; YTD PDC = FIRST FILL; Potential Fail Date: 24):   Atorvastatin 40mg last filled on 24 for 90 day supply. Next refill due: 7/10    Seening different Dr, different health system- DARY JENSEN MD.  Previously with Dr. Thompson    Prescribed si tablet/capsule daily    Lab Results   Component Value Date    CHOL 171 2022    TRIG 152 (H) 2022    HDL 45 2023    LDLDIRECT 72 2023     Lab Results   Component Value Date    LDL 87 2023    LDLDIRECT 72 2023      ALT   Date Value Ref Range Status   2023 8 (L) 10 - 40 U/L Final     AST   Date Value Ref Range Status   2023 10 (L) 15 - 37 U/L Final     The ASCVD Risk score (Yumiko DK, et al., 2019) failed to calculate for the following reasons:    The 2019 ASCVD risk score is only valid for ages 40 to 79     PLAN  The following are interventions that have been identified:   Patient OVERDUE refilling Atorvastatin 40mg and active on home medication list.     No patient outreach planned at this time. Sending refill request via fax to new PCP.  Prescription at pharmacy is out of refills    Recent Visits  Date Type Provider Dept   23 Office Visit Thuy Dozier MD [facility]   Showing recent visits within past 540 days with a meds authorizing provider and meeting all other requirements  Future Appointments  No visits were found meeting these conditions.  Showing future appointments within next 150 days with a meds authorizing provider and meeting all other requirements    No future appointments.    PARESH Mullen, PharmD, BCACP  Ambulatory Care Clinical Pharmacist- [facility]

## 2024-12-31 DIAGNOSIS — I10 PRIMARY HYPERTENSION: ICD-10-CM

## 2024-12-31 DIAGNOSIS — I50.22 CHRONIC SYSTOLIC HEART FAILURE (HCC): ICD-10-CM

## 2024-12-31 RX ORDER — LOSARTAN POTASSIUM 25 MG/1
25 TABLET ORAL DAILY
Qty: 90 TABLET | Refills: 1 | OUTPATIENT
Start: 2024-12-31

## 2025-02-26 DIAGNOSIS — I10 PRIMARY HYPERTENSION: ICD-10-CM

## 2025-02-26 DIAGNOSIS — I50.22 CHRONIC SYSTOLIC HEART FAILURE (HCC): ICD-10-CM

## 2025-02-26 RX ORDER — LOSARTAN POTASSIUM 25 MG/1
25 TABLET ORAL DAILY
Qty: 90 TABLET | Refills: 1 | OUTPATIENT
Start: 2025-02-26

## 2025-08-06 ENCOUNTER — OFFICE VISIT (OUTPATIENT)
Dept: CARDIOLOGY CLINIC | Age: 80
End: 2025-08-06
Payer: MEDICARE

## 2025-08-06 VITALS
SYSTOLIC BLOOD PRESSURE: 134 MMHG | DIASTOLIC BLOOD PRESSURE: 70 MMHG | HEART RATE: 85 BPM | OXYGEN SATURATION: 96 % | BODY MASS INDEX: 38.32 KG/M2 | HEIGHT: 65 IN | WEIGHT: 230 LBS

## 2025-08-06 DIAGNOSIS — I49.5 SSS (SICK SINUS SYNDROME) (HCC): ICD-10-CM

## 2025-08-06 DIAGNOSIS — Z95.0 PACEMAKER: Primary | ICD-10-CM

## 2025-08-06 DIAGNOSIS — I48.0 PAF (PAROXYSMAL ATRIAL FIBRILLATION) (HCC): ICD-10-CM

## 2025-08-06 PROCEDURE — 1159F MED LIST DOCD IN RCRD: CPT | Performed by: INTERNAL MEDICINE

## 2025-08-06 PROCEDURE — 3075F SYST BP GE 130 - 139MM HG: CPT | Performed by: INTERNAL MEDICINE

## 2025-08-06 PROCEDURE — 99214 OFFICE O/P EST MOD 30 MIN: CPT | Performed by: INTERNAL MEDICINE

## 2025-08-06 PROCEDURE — 3078F DIAST BP <80 MM HG: CPT | Performed by: INTERNAL MEDICINE

## 2025-08-06 PROCEDURE — 1123F ACP DISCUSS/DSCN MKR DOCD: CPT | Performed by: INTERNAL MEDICINE

## 2025-08-06 PROCEDURE — 93000 ELECTROCARDIOGRAM COMPLETE: CPT | Performed by: INTERNAL MEDICINE

## 2025-08-06 RX ORDER — FUROSEMIDE 40 MG/1
40 TABLET ORAL DAILY
COMMUNITY
Start: 2025-07-23